# Patient Record
Sex: FEMALE | Race: BLACK OR AFRICAN AMERICAN | Employment: OTHER | URBAN - METROPOLITAN AREA
[De-identification: names, ages, dates, MRNs, and addresses within clinical notes are randomized per-mention and may not be internally consistent; named-entity substitution may affect disease eponyms.]

---

## 2017-02-24 ENCOUNTER — HOSPITAL ENCOUNTER (EMERGENCY)
Age: 72
Discharge: HOME | End: 2017-02-24
Attending: EMERGENCY MEDICINE
Payer: MEDICARE

## 2017-02-24 ENCOUNTER — APPOINTMENT (OUTPATIENT)
Dept: GENERAL RADIOLOGY | Age: 72
End: 2017-02-24
Attending: NURSE PRACTITIONER
Payer: MEDICARE

## 2017-02-24 VITALS
SYSTOLIC BLOOD PRESSURE: 153 MMHG | HEART RATE: 75 BPM | WEIGHT: 260 LBS | DIASTOLIC BLOOD PRESSURE: 73 MMHG | TEMPERATURE: 98 F | HEIGHT: 71 IN | BODY MASS INDEX: 36.4 KG/M2 | OXYGEN SATURATION: 97 % | RESPIRATION RATE: 17 BRPM

## 2017-02-24 DIAGNOSIS — M79.89 LEG SWELLING: Primary | ICD-10-CM

## 2017-02-24 DIAGNOSIS — R53.81 DEBILITY: ICD-10-CM

## 2017-02-24 DIAGNOSIS — N30.01 ACUTE CYSTITIS WITH HEMATURIA: ICD-10-CM

## 2017-02-24 LAB
ALBUMIN SERPL BCP-MCNC: 3.8 G/DL (ref 3.5–5)
ALBUMIN/GLOB SERPL: 1 {RATIO} (ref 1.1–2.2)
ALP SERPL-CCNC: 111 U/L (ref 45–117)
ALT SERPL-CCNC: 19 U/L (ref 12–78)
ANION GAP BLD CALC-SCNC: 5 MMOL/L (ref 5–15)
APPEARANCE UR: ABNORMAL
AST SERPL W P-5'-P-CCNC: 12 U/L (ref 15–37)
BACTERIA URNS QL MICRO: ABNORMAL /HPF
BASOPHILS # BLD AUTO: 0 K/UL (ref 0–0.1)
BASOPHILS # BLD: 0 % (ref 0–1)
BILIRUB SERPL-MCNC: 0.4 MG/DL (ref 0.2–1)
BILIRUB UR QL: NEGATIVE
BNP SERPL-MCNC: 130 PG/ML (ref 0–125)
BUN SERPL-MCNC: 22 MG/DL (ref 6–20)
BUN/CREAT SERPL: 35 (ref 12–20)
CALCIUM SERPL-MCNC: 8.7 MG/DL (ref 8.5–10.1)
CHLORIDE SERPL-SCNC: 107 MMOL/L (ref 97–108)
CK SERPL-CCNC: 83 U/L (ref 26–192)
CO2 SERPL-SCNC: 29 MMOL/L (ref 21–32)
COLOR UR: ABNORMAL
CREAT SERPL-MCNC: 0.62 MG/DL (ref 0.55–1.02)
EOSINOPHIL # BLD: 0.2 K/UL (ref 0–0.4)
EOSINOPHIL NFR BLD: 3 % (ref 0–7)
EPITH CASTS URNS QL MICRO: ABNORMAL /LPF
ERYTHROCYTE [DISTWIDTH] IN BLOOD BY AUTOMATED COUNT: 14.2 % (ref 11.5–14.5)
GLOBULIN SER CALC-MCNC: 3.8 G/DL (ref 2–4)
GLUCOSE SERPL-MCNC: 85 MG/DL (ref 65–100)
GLUCOSE UR STRIP.AUTO-MCNC: NEGATIVE MG/DL
HCT VFR BLD AUTO: 35 % (ref 35–47)
HGB BLD-MCNC: 10.5 G/DL (ref 11.5–16)
HGB UR QL STRIP: ABNORMAL
HYALINE CASTS URNS QL MICRO: ABNORMAL /LPF (ref 0–5)
KETONES UR QL STRIP.AUTO: NEGATIVE MG/DL
LEUKOCYTE ESTERASE UR QL STRIP.AUTO: ABNORMAL
LYMPHOCYTES # BLD AUTO: 33 % (ref 12–49)
LYMPHOCYTES # BLD: 2.2 K/UL (ref 0.8–3.5)
MCH RBC QN AUTO: 26.4 PG (ref 26–34)
MCHC RBC AUTO-ENTMCNC: 30 G/DL (ref 30–36.5)
MCV RBC AUTO: 88.2 FL (ref 80–99)
MONOCYTES # BLD: 0.5 K/UL (ref 0–1)
MONOCYTES NFR BLD AUTO: 7 % (ref 5–13)
NEUTS SEG # BLD: 3.9 K/UL (ref 1.8–8)
NEUTS SEG NFR BLD AUTO: 57 % (ref 32–75)
NITRITE UR QL STRIP.AUTO: POSITIVE
PH UR STRIP: 6 [PH] (ref 5–8)
PLATELET # BLD AUTO: 246 K/UL (ref 150–400)
POTASSIUM SERPL-SCNC: 3.9 MMOL/L (ref 3.5–5.1)
PROT SERPL-MCNC: 7.6 G/DL (ref 6.4–8.2)
PROT UR STRIP-MCNC: ABNORMAL MG/DL
RBC # BLD AUTO: 3.97 M/UL (ref 3.8–5.2)
RBC #/AREA URNS HPF: ABNORMAL /HPF (ref 0–5)
SODIUM SERPL-SCNC: 141 MMOL/L (ref 136–145)
SP GR UR REFRACTOMETRY: 1.02 (ref 1–1.03)
TROPONIN I SERPL-MCNC: <0.04 NG/ML
UROBILINOGEN UR QL STRIP.AUTO: 0.2 EU/DL (ref 0.2–1)
WBC # BLD AUTO: 6.8 K/UL (ref 3.6–11)
WBC URNS QL MICRO: ABNORMAL /HPF (ref 0–4)

## 2017-02-24 PROCEDURE — 36415 COLL VENOUS BLD VENIPUNCTURE: CPT | Performed by: NURSE PRACTITIONER

## 2017-02-24 PROCEDURE — 71010 XR CHEST PORT: CPT

## 2017-02-24 PROCEDURE — 84484 ASSAY OF TROPONIN QUANT: CPT | Performed by: NURSE PRACTITIONER

## 2017-02-24 PROCEDURE — 82550 ASSAY OF CK (CPK): CPT | Performed by: NURSE PRACTITIONER

## 2017-02-24 PROCEDURE — 93005 ELECTROCARDIOGRAM TRACING: CPT

## 2017-02-24 PROCEDURE — 83880 ASSAY OF NATRIURETIC PEPTIDE: CPT | Performed by: NURSE PRACTITIONER

## 2017-02-24 PROCEDURE — 85025 COMPLETE CBC W/AUTO DIFF WBC: CPT | Performed by: NURSE PRACTITIONER

## 2017-02-24 PROCEDURE — 81001 URINALYSIS AUTO W/SCOPE: CPT | Performed by: NURSE PRACTITIONER

## 2017-02-24 PROCEDURE — 80053 COMPREHEN METABOLIC PANEL: CPT | Performed by: NURSE PRACTITIONER

## 2017-02-24 PROCEDURE — 99284 EMERGENCY DEPT VISIT MOD MDM: CPT

## 2017-02-24 RX ORDER — FUROSEMIDE 40 MG/1
40 TABLET ORAL DAILY
Qty: 7 TAB | Refills: 0 | Status: SHIPPED | OUTPATIENT
Start: 2017-02-24 | End: 2017-03-03

## 2017-02-24 RX ORDER — CEPHALEXIN 500 MG/1
500 CAPSULE ORAL 4 TIMES DAILY
Qty: 28 CAP | Refills: 0 | Status: SHIPPED | OUTPATIENT
Start: 2017-02-24 | End: 2017-03-03

## 2017-02-25 NOTE — DISCHARGE INSTRUCTIONS
We hope that we have addressed all of your medical concerns. The examination and treatment you received in the Emergency Department were for an emergent problem and were not intended as complete care. It is important that you follow up with your healthcare provider(s) for ongoing care. If your symptoms worsen or do not improve as expected, and you are unable to reach your usual health care provider(s), you should return to the Emergency Department. Today's healthcare is undergoing tremendous change, and patient satisfaction surveys are one of the many tools to assess the quality of medical care. You may receive a survey from the [facility] regarding your experience in the Emergency Department. I hope that your experience has been completely positive, particularly the medical care that I provided. As such, please participate in the survey; anything less than excellent does not meet my expectations or intentions. [facility] and [address] [place] [address] participate in nationally recognized quality of care measures. If your blood pressure is greater than 120/80, as reported below, we urge that you seek medical care to address the potential of high blood pressure, commonly known as hypertension. Hypertension can be hereditary or can be caused by certain medical conditions, pain, stress, or \"white coat syndrome. \"       Please make an appointment with your health care provider(s) for follow up of your Emergency Department visit. VITALS:   Patient Vitals for the past 8 hrs:   Temp Pulse Resp BP SpO2   02/24/17 2034 98 °F (36.7 °C) 75 17 153/73 97 %          Thank you for allowing us to provide you with medical care today. We realize that you have many choices for your emergency care needs. Please choose us in the future for any continued health care needs. Linsey Patrick, [address] [place] [address] [address]   Office: [phone]            Recent Results (from the past 24 hour(s))   EKG, 12 LEAD, INITIAL    Collection Time: 02/24/17 10:04 PM   Result Value Ref Range    Ventricular Rate 72 BPM    Atrial Rate 72 BPM    P-R Interval 194 ms    QRS Duration 104 ms    Q-T Interval 384 ms    QTC Calculation (Bezet) 420 ms    Calculated P Axis 28 degrees    Calculated R Axis 36 degrees    Calculated T Axis 40 degrees    Diagnosis       Normal sinus rhythm  Normal ECG  No previous ECGs available     CBC WITH AUTOMATED DIFF    Collection Time: 02/24/17 10:11 PM   Result Value Ref Range    WBC 6.8 3.6 - 11.0 K/uL    RBC 3.97 3.80 - 5.20 M/uL    HGB 10.5 (L) 11.5 - 16.0 g/dL    HCT 35.0 35.0 - 47.0 %    MCV 88.2 80.0 - 99.0 FL    MCH 26.4 26.0 - 34.0 PG    MCHC 30.0 30.0 - 36.5 g/dL    RDW 14.2 11.5 - 14.5 %    PLATELET 381 511 - 567 K/uL    NEUTROPHILS 57 32 - 75 %    LYMPHOCYTES 33 12 - 49 %    MONOCYTES 7 5 - 13 %    EOSINOPHILS 3 0 - 7 %    BASOPHILS 0 0 - 1 %    ABS. NEUTROPHILS 3.9 1.8 - 8.0 K/UL    ABS. LYMPHOCYTES 2.2 0.8 - 3.5 K/UL    ABS. MONOCYTES 0.5 0.0 - 1.0 K/UL    ABS. EOSINOPHILS 0.2 0.0 - 0.4 K/UL    ABS. BASOPHILS 0.0 0.0 - 0.1 K/UL   METABOLIC PANEL, COMPREHENSIVE    Collection Time: 02/24/17 10:11 PM   Result Value Ref Range    Sodium 141 136 - 145 mmol/L    Potassium 3.9 3.5 - 5.1 mmol/L    Chloride 107 97 - 108 mmol/L    CO2 29 21 - 32 mmol/L    Anion gap 5 5 - 15 mmol/L    Glucose 85 65 - 100 mg/dL    BUN 22 (H) 6 - 20 MG/DL    Creatinine 0.62 0.55 - 1.02 MG/DL    BUN/Creatinine ratio 35 (H) 12 - 20      GFR est AA >60 >60 ml/min/1.73m2    GFR est non-AA >60 >60 ml/min/1.73m2    Calcium 8.7 8.5 - 10.1 MG/DL    Bilirubin, total 0.4 0.2 - 1.0 MG/DL    ALT (SGPT) 19 12 - 78 U/L    AST (SGOT) 12 (L) 15 - 37 U/L    Alk.  phosphatase 111 45 - 117 U/L    Protein, total 7.6 6.4 - 8.2 g/dL    Albumin 3.8 3.5 - 5.0 g/dL    Globulin 3.8 2.0 - 4.0 g/dL    A-G Ratio 1.0 (L) 1.1 - 2.2     PRO-BNP    Collection Time: 02/24/17 10:11 PM   Result Value Ref Range    NT pro- (H) 0 - 125 PG/ML   TROPONIN I    Collection Time: 02/24/17 10:11 PM   Result Value Ref Range    Troponin-I, Qt. <0.04 <0.05 ng/mL   CK W/ REFLX CKMB    Collection Time: 02/24/17 10:11 PM   Result Value Ref Range    CK 83 26 - 192 U/L   URINALYSIS W/MICROSCOPIC    Collection Time: 02/24/17 10:18 PM   Result Value Ref Range    Color YELLOW/STRAW      Appearance CLOUDY (A) CLEAR      Specific gravity 1.020 1.003 - 1.030      pH (UA) 6.0 5.0 - 8.0      Protein TRACE (A) NEG mg/dL    Glucose NEGATIVE  NEG mg/dL    Ketone NEGATIVE  NEG mg/dL    Bilirubin NEGATIVE  NEG      Blood MODERATE (A) NEG      Urobilinogen 0.2 0.2 - 1.0 EU/dL    Nitrites POSITIVE (A) NEG      Leukocyte Esterase SMALL (A) NEG      WBC 20-50 0 - 4 /hpf    RBC 10-20 0 - 5 /hpf    Epithelial cells FEW FEW /lpf    Bacteria 4+ (A) NEG /hpf    Hyaline cast 0-2 0 - 5 /lpf       Xr Chest Port    Result Date: 2/24/2017  EXAM:  XR CHEST PORT INDICATION:  Bilateral lower extremity swelling COMPARISON:  None. FINDINGS: A portable AP radiograph of the chest was obtained at 2142 hours. There is no pneumothorax or pleural effusion. The lungs are clear. Cardiac size is within normal limits. Minimal thoracic aortic tortuosity is shown with otherwise normal mediastinal and hilar contour. Severe right glenohumeral osteoarthrosis is noted. IMPRESSION: No acute findings            Urinary Tract Infection in Women: Care Instructions  Your Care Instructions    A urinary tract infection, or UTI, is a general term for an infection anywhere between the kidneys and the urethra (where urine comes out). Most UTIs are bladder infections. They often cause pain or burning when you urinate. UTIs are caused by bacteria and can be cured with antibiotics. Be sure to complete your treatment so that the infection goes away. Follow-up care is a key part of your treatment and safety.  Be sure to make and go to all appointments, and call your doctor if you are having problems. It's also a good idea to know your test results and keep a list of the medicines you take. How can you care for yourself at home? · Take your antibiotics as directed. Do not stop taking them just because you feel better. You need to take the full course of antibiotics. · Drink extra water and other fluids for the next day or two. This may help wash out the bacteria that are causing the infection. (If you have kidney, heart, or liver disease and have to limit fluids, talk with your doctor before you increase your fluid intake.)  · Avoid drinks that are carbonated or have caffeine. They can irritate the bladder. · Urinate often. Try to empty your bladder each time. · To relieve pain, take a hot bath or lay a heating pad set on low over your lower belly or genital area. Never go to sleep with a heating pad in place. To prevent UTIs  · Drink plenty of water each day. This helps you urinate often, which clears bacteria from your system. (If you have kidney, heart, or liver disease and have to limit fluids, talk with your doctor before you increase your fluid intake.)  · Consider adding cranberry juice to your diet. · Urinate when you need to. · Urinate right after you have sex. · Change sanitary pads often. · Avoid douches, bubble baths, feminine hygiene sprays, and other feminine hygiene products that have deodorants. · After going to the bathroom, wipe from front to back. When should you call for help? Call your doctor now or seek immediate medical care if:  · Symptoms such as fever, chills, nausea, or vomiting get worse or appear for the first time. · You have new pain in your back just below your rib cage. This is called flank pain. · There is new blood or pus in your urine. · You have any problems with your antibiotic medicine.   Watch closely for changes in your health, and be sure to contact your doctor if:  · You are not getting better after taking an antibiotic for 2 days. · Your symptoms go away but then come back. Where can you learn more? Go to http://debbie-queta.info/. Enter S930 in the search box to learn more about \"Urinary Tract Infection in Women: Care Instructions. \"  Current as of: August 12, 2016  Content Version: 11.1  © 4761-2452 [employer]. Care instructions adapted under license by [employer] (which disclaims liability or warranty for this information). If you have questions about a medical condition or this instruction, always ask your healthcare professional. [address] any warranty or liability for your use of this information.

## 2017-02-25 NOTE — ED PROVIDER NOTES
HPI Comments: Nat Scott is a 67 y.o. female with Hx of idiopathic neuropathy and chronic pain who presents via [facility] with her daughter to [facility] ED with cc of BLLE swelling. Pt notes worsening BLLE swelling x 1 day. She reports swelling from her feet to her thighs. She notes that she also feels her abdomen is a little distended as well. Pt primarily sits in [facility] and has very little ambulation. She is currently under the care of a rehab MD for pain management and does aqua therapy. She sleeps in a recliner, but denies any increased use of pillows 2/2 SOB lying flat. She denies CP/SOB/ recent unexplained weight gain. She denies any color or temperature changes to the BLLE. She has no recent history of fevers or chills. She notes that she has \"spitting\" urine but no dysuria, flank pain, hematuria. PCP: Frieda Easley DO    There are no other complaints, changes or physical findings at this time. The history is provided by the patient and a relative. Past Medical History:   Diagnosis Date    Arthritis     Asthma     Hypertension        Past Surgical History:   Procedure Laterality Date    HX ORTHOPAEDIC           History reviewed. No pertinent family history. Social History     Social History    Marital status: SINGLE     Spouse name: N/A    Number of children: N/A    Years of education: N/A     Occupational History    Not on file. Social History Main Topics    Smoking status: Never Smoker    Smokeless tobacco: Not on file    Alcohol use No    Drug use: No    Sexual activity: Not on file     Other Topics Concern    Not on file     Social History Narrative         ALLERGIES: Morphine    Review of Systems   Constitutional: Negative for activity change, appetite change, chills and fever. HENT: Negative for congestion, rhinorrhea, sinus pressure, sneezing and sore throat. Eyes: Negative for pain, discharge and visual disturbance.    Respiratory: Negative for cough and shortness of breath. Cardiovascular: Positive for leg swelling. Negative for chest pain. Gastrointestinal: Negative for abdominal pain, diarrhea, nausea and vomiting. Genitourinary: Negative for dysuria, flank pain, frequency and urgency. Musculoskeletal: Negative for arthralgias, back pain, gait problem, joint swelling, myalgias and neck pain. Skin: Negative for color change and rash. Neurological: Negative for dizziness, speech difficulty, weakness, light-headedness, numbness and headaches. Psychiatric/Behavioral: Negative for agitation, behavioral problems and confusion. All other systems reviewed and are negative. Vitals:    02/24/17 2034   BP: 153/73   Pulse: 75   Resp: 17   Temp: 98 °F (36.7 °C)   SpO2: 97%   Weight: 117.9 kg (260 lb)   Height: 5' 11\" (1.803 m)            Physical Exam   Constitutional: She is oriented to person, place, and time. She appears well-developed and well-nourished. No distress. HENT:   Head: Normocephalic and atraumatic. Right Ear: External ear normal.   Left Ear: External ear normal.   Nose: Nose normal.   Mouth/Throat: Oropharynx is clear and moist. No oropharyngeal exudate. Eyes: Conjunctivae and EOM are normal. Pupils are equal, round, and reactive to light. Neck: Normal range of motion. Neck supple. Cardiovascular: Normal rate, regular rhythm, normal heart sounds and intact distal pulses. Pulmonary/Chest: Effort normal and breath sounds normal.   Abdominal: Soft. Bowel sounds are normal. There is no tenderness. There is no rebound and no guarding. Musculoskeletal: Normal range of motion. She exhibits edema (+) 3 BLLE to thighs . Neurological: She is alert and oriented to person, place, and time. Skin: Skin is warm and dry. Psychiatric: She has a normal mood and affect. Her behavior is normal. Judgment and thought content normal.   Nursing note and vitals reviewed.        MDM  Number of Diagnoses or Management Options  Acute cystitis with hematuria: Debility:   Leg swelling:   Diagnosis management comments: DDx: CHF, venous insufficiency, chronic pain     68 yo F presents with BLLE swelling. (-) CHF w/u. VSS. Discussed possibility of venous insufficiency and need for PCP f/u. Pt agreeable. Pt leaves legs dependent most of the day, advised on elevation. Given short Lasix course and advised on SE, including loss of K. Amount and/or Complexity of Data Reviewed  Clinical lab tests: ordered and reviewed  Tests in the radiology section of CPT®: ordered and reviewed  Review and summarize past medical records: yes  Discuss the patient with other providers: yes      ED Course       Procedures    LABORATORY TESTS:  Recent Results (from the past 12 hour(s))   EKG, 12 LEAD, INITIAL    Collection Time: 02/24/17 10:04 PM   Result Value Ref Range    Ventricular Rate 72 BPM    Atrial Rate 72 BPM    P-R Interval 194 ms    QRS Duration 104 ms    Q-T Interval 384 ms    QTC Calculation (Bezet) 420 ms    Calculated P Axis 28 degrees    Calculated R Axis 36 degrees    Calculated T Axis 40 degrees    Diagnosis       Normal sinus rhythm  Normal ECG  No previous ECGs available     CBC WITH AUTOMATED DIFF    Collection Time: 02/24/17 10:11 PM   Result Value Ref Range    WBC 6.8 3.6 - 11.0 K/uL    RBC 3.97 3.80 - 5.20 M/uL    HGB 10.5 (L) 11.5 - 16.0 g/dL    HCT 35.0 35.0 - 47.0 %    MCV 88.2 80.0 - 99.0 FL    MCH 26.4 26.0 - 34.0 PG    MCHC 30.0 30.0 - 36.5 g/dL    RDW 14.2 11.5 - 14.5 %    PLATELET 160 831 - 238 K/uL    NEUTROPHILS 57 32 - 75 %    LYMPHOCYTES 33 12 - 49 %    MONOCYTES 7 5 - 13 %    EOSINOPHILS 3 0 - 7 %    BASOPHILS 0 0 - 1 %    ABS. NEUTROPHILS 3.9 1.8 - 8.0 K/UL    ABS. LYMPHOCYTES 2.2 0.8 - 3.5 K/UL    ABS. MONOCYTES 0.5 0.0 - 1.0 K/UL    ABS. EOSINOPHILS 0.2 0.0 - 0.4 K/UL    ABS.  BASOPHILS 0.0 0.0 - 0.1 K/UL   METABOLIC PANEL, COMPREHENSIVE    Collection Time: 02/24/17 10:11 PM   Result Value Ref Range    Sodium 141 136 - 145 mmol/L    Potassium 3.9 3.5 - 5.1 mmol/L    Chloride 107 97 - 108 mmol/L    CO2 29 21 - 32 mmol/L    Anion gap 5 5 - 15 mmol/L    Glucose 85 65 - 100 mg/dL    BUN 22 (H) 6 - 20 MG/DL    Creatinine 0.62 0.55 - 1.02 MG/DL    BUN/Creatinine ratio 35 (H) 12 - 20      GFR est AA >60 >60 ml/min/1.73m2    GFR est non-AA >60 >60 ml/min/1.73m2    Calcium 8.7 8.5 - 10.1 MG/DL    Bilirubin, total 0.4 0.2 - 1.0 MG/DL    ALT (SGPT) 19 12 - 78 U/L    AST (SGOT) 12 (L) 15 - 37 U/L    Alk. phosphatase 111 45 - 117 U/L    Protein, total 7.6 6.4 - 8.2 g/dL    Albumin 3.8 3.5 - 5.0 g/dL    Globulin 3.8 2.0 - 4.0 g/dL    A-G Ratio 1.0 (L) 1.1 - 2.2     PRO-BNP    Collection Time: 02/24/17 10:11 PM   Result Value Ref Range    NT pro- (H) 0 - 125 PG/ML   TROPONIN I    Collection Time: 02/24/17 10:11 PM   Result Value Ref Range    Troponin-I, Qt. <0.04 <0.05 ng/mL   CK W/ REFLX CKMB    Collection Time: 02/24/17 10:11 PM   Result Value Ref Range    CK 83 26 - 192 U/L   URINALYSIS W/MICROSCOPIC    Collection Time: 02/24/17 10:18 PM   Result Value Ref Range    Color YELLOW/STRAW      Appearance CLOUDY (A) CLEAR      Specific gravity 1.020 1.003 - 1.030      pH (UA) 6.0 5.0 - 8.0      Protein TRACE (A) NEG mg/dL    Glucose NEGATIVE  NEG mg/dL    Ketone NEGATIVE  NEG mg/dL    Bilirubin NEGATIVE  NEG      Blood MODERATE (A) NEG      Urobilinogen 0.2 0.2 - 1.0 EU/dL    Nitrites POSITIVE (A) NEG      Leukocyte Esterase SMALL (A) NEG      WBC 20-50 0 - 4 /hpf    RBC 10-20 0 - 5 /hpf    Epithelial cells FEW FEW /lpf    Bacteria 4+ (A) NEG /hpf    Hyaline cast 0-2 0 - 5 /lpf       IMAGING RESULTS:  XR CHEST PORT   Final Result      EXAM: XR CHEST PORT     INDICATION: Bilateral lower extremity swelling     COMPARISON: None.     FINDINGS: A portable AP radiograph of the chest was obtained at 2142 hours. There is no pneumothorax or pleural effusion. The lungs are clear. Cardiac  size is within normal limits.  Minimal thoracic aortic tortuosity is shown with  otherwise normal mediastinal and hilar contour. Severe right glenohumeral  osteoarthrosis is noted.      IMPRESSION  IMPRESSION: No acute findings       MEDICATIONS GIVEN:  Medications - No data to display    IMPRESSION:  1. Leg swelling    2. Acute cystitis with hematuria    3. Debility        PLAN:  1. Discharge Medication List as of 2/24/2017 11:12 PM      START taking these medications    Details   furosemide (LASIX) 40 mg tablet Take 1 Tab by mouth daily for 7 days. , Print, Disp-7 Tab, R-0      cephALEXin (KEFLEX) 500 mg capsule Take 1 Cap by mouth four (4) times daily for 7 days. , Print, Disp-28 Cap, R-0         CONTINUE these medications which have NOT CHANGED    Details   ibuprofen (MOTRIN) 400 mg tablet Take  by mouth every six (6) hours as needed for Pain., Historical Med      naproxen (NAPROSYN) 500 mg tablet Take 1 Tab by mouth every twelve (12) hours as needed for Pain., Print, Disp-20 Tab, R-0      HYDROcodone-acetaminophen (NORCO) 5-325 mg per tablet Take 1 Tab by mouth every four (4) hours as needed for Pain. Max Daily Amount: 6 Tabs., Print, Disp-20 Tab, R-0      !! gabapentin (NEURONTIN) 300 mg capsule Take 1 Cap by mouth nightly. , Print, Disp-20 Cap, R-0      LOSARTAN PO Take  by mouth two (2) times a day. Historical Med      PHENYLEPHRINE/HYDROCODONE/CP (HYDROCODONE CP PO) Take  by mouth. Historical Med      !! gabapentin (NEURONTIN) 300 mg capsule Take 1 Cap by mouth nightly. Print, 300 mg, Disp-30 Cap, R-0      zolpidem (AMBIEN) 10 mg tablet Take 10 mg by mouth nightly as needed. Historical Med, 10 mg       !! - Potential duplicate medications found. Please discuss with provider.         2.   Follow-up Information     Follow up With Details Comments [address], DO Schedule an appointment as soon as possible for a visit Please go see on Monday/Tuesday regarding your swelling  [address]  [address] [place] [address]      [facility] EMERGENCY DEPT Go to As needed, If symptoms worsen [facility]  [phone]        3. Return to ED if worse     Discharge Note:    The patient is ready for discharge. The patient's signs, symptoms, diagnosis, and discharge instruction have been discussed and the patient has conveyed their understanding. The patient is to follow up as recommended or return to the ER should their symptoms worsen. Plan has been discussed and the patient is in agreement.     Mac Burnett NP

## 2017-02-26 LAB
ATRIAL RATE: 72 BPM
CALCULATED P AXIS, ECG09: 28 DEGREES
CALCULATED R AXIS, ECG10: 36 DEGREES
CALCULATED T AXIS, ECG11: 40 DEGREES
DIAGNOSIS, 93000: NORMAL
P-R INTERVAL, ECG05: 194 MS
Q-T INTERVAL, ECG07: 384 MS
QRS DURATION, ECG06: 104 MS
QTC CALCULATION (BEZET), ECG08: 420 MS
VENTRICULAR RATE, ECG03: 72 BPM

## 2017-09-07 ENCOUNTER — HOSPITAL ENCOUNTER (EMERGENCY)
Age: 72
Discharge: HOME | End: 2017-09-07
Attending: EMERGENCY MEDICINE
Payer: MEDICARE

## 2017-09-07 ENCOUNTER — APPOINTMENT (OUTPATIENT)
Dept: CT IMAGING | Age: 72
End: 2017-09-07
Attending: PHYSICIAN ASSISTANT
Payer: MEDICARE

## 2017-09-07 VITALS
BODY MASS INDEX: 37.22 KG/M2 | OXYGEN SATURATION: 98 % | TEMPERATURE: 98.5 F | RESPIRATION RATE: 16 BRPM | DIASTOLIC BLOOD PRESSURE: 97 MMHG | HEIGHT: 70 IN | WEIGHT: 260 LBS | SYSTOLIC BLOOD PRESSURE: 191 MMHG | HEART RATE: 74 BPM

## 2017-09-07 DIAGNOSIS — G89.4 CHRONIC PAIN SYNDROME: ICD-10-CM

## 2017-09-07 DIAGNOSIS — R51.9 HEADACHE, UNSPECIFIED HEADACHE TYPE: ICD-10-CM

## 2017-09-07 DIAGNOSIS — N39.0 URINARY TRACT INFECTION WITHOUT HEMATURIA, SITE UNSPECIFIED: Primary | ICD-10-CM

## 2017-09-07 LAB
ANION GAP SERPL CALC-SCNC: 6 MMOL/L (ref 5–15)
APPEARANCE UR: ABNORMAL
BACTERIA URNS QL MICRO: ABNORMAL /HPF
BASOPHILS # BLD: 0 K/UL (ref 0–0.1)
BASOPHILS NFR BLD: 0 % (ref 0–1)
BILIRUB UR QL: NEGATIVE
BUN SERPL-MCNC: 16 MG/DL (ref 6–20)
BUN/CREAT SERPL: 19 (ref 12–20)
CALCIUM SERPL-MCNC: 9.1 MG/DL (ref 8.5–10.1)
CHLORIDE SERPL-SCNC: 103 MMOL/L (ref 97–108)
CO2 SERPL-SCNC: 31 MMOL/L (ref 21–32)
COLOR UR: ABNORMAL
CREAT SERPL-MCNC: 0.85 MG/DL (ref 0.55–1.02)
EOSINOPHIL # BLD: 0.1 K/UL (ref 0–0.4)
EOSINOPHIL NFR BLD: 1 % (ref 0–7)
EPITH CASTS URNS QL MICRO: ABNORMAL /LPF
ERYTHROCYTE [DISTWIDTH] IN BLOOD BY AUTOMATED COUNT: 13.8 % (ref 11.5–14.5)
GLUCOSE SERPL-MCNC: 117 MG/DL (ref 65–100)
GLUCOSE UR STRIP.AUTO-MCNC: NEGATIVE MG/DL
HCT VFR BLD AUTO: 36.6 % (ref 35–47)
HGB BLD-MCNC: 11.4 G/DL (ref 11.5–16)
HGB UR QL STRIP: ABNORMAL
KETONES UR QL STRIP.AUTO: NEGATIVE MG/DL
LEUKOCYTE ESTERASE UR QL STRIP.AUTO: ABNORMAL
LYMPHOCYTES # BLD: 1.4 K/UL (ref 0.8–3.5)
LYMPHOCYTES NFR BLD: 26 % (ref 12–49)
MCH RBC QN AUTO: 27.5 PG (ref 26–34)
MCHC RBC AUTO-ENTMCNC: 31.1 G/DL (ref 30–36.5)
MCV RBC AUTO: 88.2 FL (ref 80–99)
MONOCYTES # BLD: 0.6 K/UL (ref 0–1)
MONOCYTES NFR BLD: 11 % (ref 5–13)
NEUTS SEG # BLD: 3.5 K/UL (ref 1.8–8)
NEUTS SEG NFR BLD: 62 % (ref 32–75)
NITRITE UR QL STRIP.AUTO: POSITIVE
PH UR STRIP: 7 [PH] (ref 5–8)
PLATELET # BLD AUTO: 258 K/UL (ref 150–400)
POTASSIUM SERPL-SCNC: 4 MMOL/L (ref 3.5–5.1)
PROT UR STRIP-MCNC: NEGATIVE MG/DL
RBC # BLD AUTO: 4.15 M/UL (ref 3.8–5.2)
RBC #/AREA URNS HPF: ABNORMAL /HPF (ref 0–5)
SODIUM SERPL-SCNC: 140 MMOL/L (ref 136–145)
SP GR UR REFRACTOMETRY: 1.02 (ref 1–1.03)
UROBILINOGEN UR QL STRIP.AUTO: 0.2 EU/DL (ref 0.2–1)
WBC # BLD AUTO: 5.6 K/UL (ref 3.6–11)
WBC URNS QL MICRO: ABNORMAL /HPF (ref 0–4)

## 2017-09-07 PROCEDURE — 74011250636 HC RX REV CODE- 250/636: Performed by: PHYSICIAN ASSISTANT

## 2017-09-07 PROCEDURE — 85025 COMPLETE CBC W/AUTO DIFF WBC: CPT | Performed by: EMERGENCY MEDICINE

## 2017-09-07 PROCEDURE — 80048 BASIC METABOLIC PNL TOTAL CA: CPT | Performed by: EMERGENCY MEDICINE

## 2017-09-07 PROCEDURE — 96365 THER/PROPH/DIAG IV INF INIT: CPT

## 2017-09-07 PROCEDURE — 87077 CULTURE AEROBIC IDENTIFY: CPT | Performed by: PHYSICIAN ASSISTANT

## 2017-09-07 PROCEDURE — 96366 THER/PROPH/DIAG IV INF ADDON: CPT

## 2017-09-07 PROCEDURE — 87086 URINE CULTURE/COLONY COUNT: CPT | Performed by: PHYSICIAN ASSISTANT

## 2017-09-07 PROCEDURE — 87186 SC STD MICRODIL/AGAR DIL: CPT | Performed by: PHYSICIAN ASSISTANT

## 2017-09-07 PROCEDURE — 74011000258 HC RX REV CODE- 258: Performed by: PHYSICIAN ASSISTANT

## 2017-09-07 PROCEDURE — 36415 COLL VENOUS BLD VENIPUNCTURE: CPT | Performed by: EMERGENCY MEDICINE

## 2017-09-07 PROCEDURE — 70450 CT HEAD/BRAIN W/O DYE: CPT

## 2017-09-07 PROCEDURE — 99283 EMERGENCY DEPT VISIT LOW MDM: CPT

## 2017-09-07 PROCEDURE — 81001 URINALYSIS AUTO W/SCOPE: CPT | Performed by: EMERGENCY MEDICINE

## 2017-09-07 RX ORDER — CEPHALEXIN 500 MG/1
500 CAPSULE ORAL 3 TIMES DAILY
Qty: 30 CAP | Refills: 0 | Status: SHIPPED | OUTPATIENT
Start: 2017-09-07 | End: 2017-09-17

## 2017-09-07 RX ADMIN — CEFTRIAXONE SODIUM 1 G: 1 INJECTION, POWDER, FOR SOLUTION INTRAMUSCULAR; INTRAVENOUS at 15:28

## 2017-09-07 NOTE — ED PROVIDER NOTES
HPI Comments: Nehemias Barraza is a 67 y.o. female who presents ambulatory to the ED with a c/o diffuse frontal headache x 2 hours with body aches and increased urinary frequency x 2 days. Pt notes she has a hx of chronic body pains and is taking norco for the same. She is seeing Dr Candice Siegel and he \"will not increase my pain medicine\". Pt states she took 2 pain pills this am even though she knows she is only supposed to take it twice daily. Pt denies a hx of headaches. She notes some nasal congestion. Pt denies f/c, n/v/, neck pain, abd pain, cp, sob, abd pain, rash or weight loss. She did not call her pcp about her sx. Pt denies focal weakness or speech changes. PCP: .Fior Oliver, DO  PMHx significant for: Past Medical History:  No date: Arthritis  No date: Asthma  No date: Hypertension  PSHx significant for: Past Surgical History:  No date: HX ORTHOPAEDIC  Social Hx: Tobacco: denies EtOH: denies  Illicit drug use: denies     There are no further complaints or symptoms at this time. The history is provided by the patient. Past Medical History:   Diagnosis Date    Arthritis     Asthma     Hypertension        Past Surgical History:   Procedure Laterality Date    HX ORTHOPAEDIC           No family history on file. Social History     Social History    Marital status: SINGLE     Spouse name: N/A    Number of children: N/A    Years of education: N/A     Occupational History    Not on file. Social History Main Topics    Smoking status: Never Smoker    Smokeless tobacco: Not on file    Alcohol use No    Drug use: No    Sexual activity: Not on file     Other Topics Concern    Not on file     Social History Narrative         ALLERGIES: Morphine    Review of Systems   Constitutional: Negative for chills and fever. HENT: Negative for congestion, rhinorrhea, sneezing and sore throat. Eyes: Negative for redness and visual disturbance. Respiratory: Negative for shortness of breath. Cardiovascular: Negative for chest pain and leg swelling. Gastrointestinal: Negative for abdominal pain, nausea and vomiting. Genitourinary: Positive for frequency. Negative for difficulty urinating. Musculoskeletal: Negative for back pain, myalgias and neck stiffness. Skin: Negative for rash. Neurological: Positive for headaches. Negative for dizziness, syncope and weakness. Hematological: Negative for adenopathy. Vitals:    09/07/17 1131 09/07/17 1708   BP: 177/89 (!) 191/97   Pulse: 79 74   Resp: 16    Temp: 98.5 °F (36.9 °C)    SpO2: 100% 98%   Weight: 117.9 kg (260 lb)    Height: 5' 10\" (1.778 m)             Physical Exam   Constitutional: She is oriented to person, place, and time. She appears well-developed and well-nourished. No distress. HENT:   Head: Normocephalic and atraumatic. Right Ear: External ear normal.   Left Ear: External ear normal.   Mouth/Throat: Oropharynx is clear and moist. No oropharyngeal exudate. Pale nares with clear rhinorrhea     Eyes: EOM are normal. Pupils are equal, round, and reactive to light. Neck: Neck supple. No JVD present. No tracheal deviation present. No nuchal rigidity or meningismus   Cardiovascular: Normal rate, regular rhythm, normal heart sounds and intact distal pulses. Exam reveals no gallop and no friction rub. No murmur heard. Pulmonary/Chest: Effort normal and breath sounds normal. No stridor. No respiratory distress. She has no wheezes. She has no rales. She exhibits no tenderness. Abdominal: Soft. Bowel sounds are normal. She exhibits no distension and no mass. There is no tenderness. There is no rebound and no guarding. Musculoskeletal: Normal range of motion. She exhibits no edema, tenderness or deformity. Lymphadenopathy:     She has no cervical adenopathy. Neurological: She is alert and oriented to person, place, and time. No cranial nerve deficit. Coordination normal.   Skin: No rash noted. No erythema. No pallor. Psychiatric: She has a normal mood and affect. Her behavior is normal.   Nursing note and vitals reviewed. MDM  Number of Diagnoses or Management Options  Chronic pain syndrome:   Headache, unspecified headache type:   Urinary tract infection without hematuria, site unspecified:      Amount and/or Complexity of Data Reviewed  Clinical lab tests: ordered and reviewed  Tests in the radiology section of CPT®: ordered and reviewed  Tests in the medicine section of CPT®: reviewed and ordered  Obtain history from someone other than the patient: yes (daughter)  Review and summarize past medical records: yes  Independent visualization of images, tracings, or specimens: yes      ED Course       Procedures  2:00 PM  Discussed pt, sx, hx and current findings with Dr Reyes Vazquez. She is in agreement with plan and will see pt  Jeffy Darshana. GUY Smyth    3:33 PM  Updated on findings. Will give rocephin for uti. Pt eating snack. Jeffy Smyth PA-C    4:41 PM   Pt with no new sx. Feeling better. Will discharge. Will tx for UTI  Jeffy Darshana.  GUY Smyth    LABORATORY TESTS:  Recent Results (from the past 12 hour(s))   URINALYSIS W/MICROSCOPIC    Collection Time: 09/07/17 12:57 PM   Result Value Ref Range    Color YELLOW/STRAW      Appearance TURBID (A) CLEAR      Specific gravity 1.018 1.003 - 1.030      pH (UA) 7.0 5.0 - 8.0      Protein NEGATIVE  NEG mg/dL    Glucose NEGATIVE  NEG mg/dL    Ketone NEGATIVE  NEG mg/dL    Bilirubin NEGATIVE  NEG      Blood MODERATE (A) NEG      Urobilinogen 0.2 0.2 - 1.0 EU/dL    Nitrites POSITIVE (A) NEG      Leukocyte Esterase LARGE (A) NEG      WBC 20-50 0 - 4 /hpf    RBC 0-5 0 - 5 /hpf    Epithelial cells FEW FEW /lpf    Bacteria 4+ (A) NEG /hpf   CBC WITH AUTOMATED DIFF    Collection Time: 09/07/17 12:57 PM   Result Value Ref Range    WBC 5.6 3.6 - 11.0 K/uL    RBC 4.15 3.80 - 5.20 M/uL    HGB 11.4 (L) 11.5 - 16.0 g/dL    HCT 36.6 35.0 - 47.0 %    MCV 88.2 80.0 - 99.0 FL    MCH 27.5 26.0 - 34.0 PG    MCHC 31.1 30.0 - 36.5 g/dL    RDW 13.8 11.5 - 14.5 %    PLATELET 793 668 - 078 K/uL    NEUTROPHILS 62 32 - 75 %    LYMPHOCYTES 26 12 - 49 %    MONOCYTES 11 5 - 13 %    EOSINOPHILS 1 0 - 7 %    BASOPHILS 0 0 - 1 %    ABS. NEUTROPHILS 3.5 1.8 - 8.0 K/UL    ABS. LYMPHOCYTES 1.4 0.8 - 3.5 K/UL    ABS. MONOCYTES 0.6 0.0 - 1.0 K/UL    ABS. EOSINOPHILS 0.1 0.0 - 0.4 K/UL    ABS. BASOPHILS 0.0 0.0 - 0.1 K/UL   METABOLIC PANEL, BASIC    Collection Time: 09/07/17 12:57 PM   Result Value Ref Range    Sodium 140 136 - 145 mmol/L    Potassium 4.0 3.5 - 5.1 mmol/L    Chloride 103 97 - 108 mmol/L    CO2 31 21 - 32 mmol/L    Anion gap 6 5 - 15 mmol/L    Glucose 117 (H) 65 - 100 mg/dL    BUN 16 6 - 20 MG/DL    Creatinine 0.85 0.55 - 1.02 MG/DL    BUN/Creatinine ratio 19 12 - 20      GFR est AA >60 >60 ml/min/1.73m2    GFR est non-AA >60 >60 ml/min/1.73m2    Calcium 9.1 8.5 - 10.1 MG/DL       IMAGING RESULTS:    Ct Head Wo Cont    Result Date: 9/7/2017  EXAM:  CT HEAD WO CONT INDICATION:   headache since last night COMPARISON: None. CONTRAST:  None. TECHNIQUE: Unenhanced CT of the head was performed using 5 mm images. Brain and bone windows were generated. CT dose reduction was achieved through use of a standardized protocol tailored for this examination and automatic exposure control for dose modulation. FINDINGS: The ventricles and sulci are normal in size, shape and configuration and midline. There is moderate hypodensity of the cerebral white matter, likely due to intracranial small vessel disease. . There is no intracranial hemorrhage, extra-axial collection, mass, mass effect or midline shift. The basilar cisterns are open. No acute infarct is identified. The bone windows demonstrate no abnormalities. The visualized portions of the paranasal sinuses and mastoid air cells are clear. IMPRESSION: No acute findings. Hypodensity of the cerebral white matter, likely due to intracranial small vessel disease. MEDICATIONS GIVEN:  Medications   cefTRIAXone (ROCEPHIN) 1 g in 0.9% sodium chloride (MBP/ADV) 50 mL (0 g IntraVENous IV Completed 9/7/17 1710)       IMPRESSION:  1. Urinary tract infection without hematuria, site unspecified    2. Headache, unspecified headache type    3. Chronic pain syndrome        PLAN:  1. Discharge Medication List as of 9/7/2017  4:45 PM      START taking these medications    Details   cephALEXin (KEFLEX) 500 mg capsule Take 1 Cap by mouth three (3) times daily for 10 days. , Print, Disp-30 Cap, R-0         CONTINUE these medications which have NOT CHANGED    Details   ibuprofen (MOTRIN) 400 mg tablet Take  by mouth every six (6) hours as needed for Pain., Historical Med      naproxen (NAPROSYN) 500 mg tablet Take 1 Tab by mouth every twelve (12) hours as needed for Pain., Print, Disp-20 Tab, R-0      HYDROcodone-acetaminophen (NORCO) 5-325 mg per tablet Take 1 Tab by mouth every four (4) hours as needed for Pain. Max Daily Amount: 6 Tabs., Print, Disp-20 Tab, R-0      !! gabapentin (NEURONTIN) 300 mg capsule Take 1 Cap by mouth nightly. , Print, Disp-20 Cap, R-0      LOSARTAN PO Take  by mouth two (2) times a day. Historical Med      PHENYLEPHRINE/HYDROCODONE/CP (HYDROCODONE CP PO) Take  by mouth. Historical Med      !! gabapentin (NEURONTIN) 300 mg capsule Take 1 Cap by mouth nightly. Print, 300 mg, Disp-30 Cap, R-0      zolpidem (AMBIEN) 10 mg tablet Take 10 mg by mouth nightly as needed. Historical Med, 10 mg       !! - Potential duplicate medications found. Please discuss with provider. 2.   Follow-up Information     Follow up With Details Comments [address], [place] Schedule an appointment as soon as possible for a visit 2-4 days for recheck [address] [place] [address]  [phone]          Return to ED if worse     4:42 PM  Pt has been reexamined. Pt has no new complaints, changes or physical findings.  Care plan outlined and precautions discussed. All available results were reviewed with pt. All medications were reviewed with pt. All of pt's questions and concerns were addressed. Pt agrees to F/U as instructed and agrees to return to ED upon further deterioration. Pt is ready to go home.   CARMEN Solis

## 2017-09-07 NOTE — ED NOTES
Patient arrived through triage c/o urinary frequency and headache since last night. Patient is resting in bell bed in view of nurses.

## 2017-09-07 NOTE — DISCHARGE INSTRUCTIONS
Headache: Care Instructions  Your Care Instructions    Headaches have many possible causes. Most headaches aren't a sign of a more serious problem, and they will get better on their own. Home treatment may help you feel better faster. The doctor has checked you carefully, but problems can develop later. If you notice any problems or new symptoms, get medical treatment right away. Follow-up care is a key part of your treatment and safety. Be sure to make and go to all appointments, and call your doctor if you are having problems. It's also a good idea to know your test results and keep a list of the medicines you take. How can you care for yourself at home? · Do not drive if you have taken a prescription pain medicine. · Rest in a quiet, dark room until your headache is gone. Close your eyes and try to relax or go to sleep. Don't watch TV or read. · Put a cold, moist cloth or cold pack on the painful area for 10 to 20 minutes at a time. Put a thin cloth between the cold pack and your skin. · Use a warm, moist towel or a heating pad set on low to relax tight shoulder and neck muscles. · Have someone gently massage your neck and shoulders. · Take pain medicines exactly as directed. ¨ If the doctor gave you a prescription medicine for pain, take it as prescribed. ¨ If you are not taking a prescription pain medicine, ask your doctor if you can take an over-the-counter medicine. · Be careful not to take pain medicine more often than the instructions allow, because you may get worse or more frequent headaches when the medicine wears off. · Do not ignore new symptoms that occur with a headache, such as a fever, weakness or numbness, vision changes, or confusion. These may be signs of a more serious problem. To prevent headaches  · Keep a headache diary so you can figure out what triggers your headaches. Avoiding triggers may help you prevent headaches.  Record when each headache began, how long it lasted, and what the pain was like (throbbing, aching, stabbing, or dull). Write down any other symptoms you had with the headache, such as nausea, flashing lights or dark spots, or sensitivity to bright light or loud noise. Note if the headache occurred near your period. List anything that might have triggered the headache, such as certain foods (chocolate, cheese, wine) or odors, smoke, bright light, stress, or lack of sleep. · Find healthy ways to deal with stress. Headaches are most common during or right after stressful times. Take time to relax before and after you do something that has caused a headache in the past.  · Try to keep your muscles relaxed by keeping good posture. Check your jaw, face, neck, and shoulder muscles for tension, and try relaxing them. When sitting at a desk, change positions often, and stretch for 30 seconds each hour. · Get plenty of sleep and exercise. · Eat regularly and well. Long periods without food can trigger a headache. · Treat yourself to a massage. Some people find that regular massages are very helpful in relieving tension. · Limit caffeine by not drinking too much coffee, tea, or soda. But don't quit caffeine suddenly, because that can also give you headaches. · Reduce eyestrain from computers by blinking frequently and looking away from the computer screen every so often. Make sure you have proper eyewear and that your monitor is set up properly, about an arm's length away. · Seek help if you have depression or anxiety. Your headaches may be linked to these conditions. Treatment can both prevent headaches and help with symptoms of anxiety or depression. When should you call for help? Call 911 anytime you think you may need emergency care. For example, call if:  · You have signs of a stroke. These may include:  ¨ Sudden numbness, paralysis, or weakness in your face, arm, or leg, especially on only one side of your body. ¨ Sudden vision changes.   ¨ Sudden trouble speaking. ¨ Sudden confusion or trouble understanding simple statements. ¨ Sudden problems with walking or balance. ¨ A sudden, severe headache that is different from past headaches. Call your doctor now or seek immediate medical care if:  · You have a new or worse headache. · Your headache gets much worse. Where can you learn more? Go to http://debbie-queta.info/. Enter M271 in the search box to learn more about \"Headache: Care Instructions. \"  Current as of: October 14, 2016  Content Version: 11.3  © 5829-9824 [employer]. Care instructions adapted under license by [employer] (which disclaims liability or warranty for this information). If you have questions about a medical condition or this instruction, always ask your healthcare professional. [place] [address] any warranty or liability for your use of this information. Urinary Tract Infection in Women: Care Instructions  Your Care Instructions    A urinary tract infection, or UTI, is a general term for an infection anywhere between the kidneys and the urethra (where urine comes out). Most UTIs are bladder infections. They often cause pain or burning when you urinate. UTIs are caused by bacteria and can be cured with antibiotics. Be sure to complete your treatment so that the infection goes away. Follow-up care is a key part of your treatment and safety. Be sure to make and go to all appointments, and call your doctor if you are having problems. It's also a good idea to know your test results and keep a list of the medicines you take. How can you care for yourself at home? · Take your antibiotics as directed. Do not stop taking them just because you feel better. You need to take the full course of antibiotics. · Drink extra water and other fluids for the next day or two. This may help wash out the bacteria that are causing the infection.  (If you have kidney, heart, or liver disease and have to limit fluids, talk with your doctor before you increase your fluid intake.)  · Avoid drinks that are carbonated or have caffeine. They can irritate the bladder. · Urinate often. Try to empty your bladder each time. · To relieve pain, take a hot bath or lay a heating pad set on low over your lower belly or genital area. Never go to sleep with a heating pad in place. To prevent UTIs  · Drink plenty of water each day. This helps you urinate often, which clears bacteria from your system. (If you have kidney, heart, or liver disease and have to limit fluids, talk with your doctor before you increase your fluid intake.)  · Urinate when you need to. · Urinate right after you have sex. · Change sanitary pads often. · Avoid douches, bubble baths, feminine hygiene sprays, and other feminine hygiene products that have deodorants. · After going to the bathroom, wipe from front to back. When should you call for help? Call your doctor now or seek immediate medical care if:  · Symptoms such as fever, chills, nausea, or vomiting get worse or appear for the first time. · You have new pain in your back just below your rib cage. This is called flank pain. · There is new blood or pus in your urine. · You have any problems with your antibiotic medicine. Watch closely for changes in your health, and be sure to contact your doctor if:  · You are not getting better after taking an antibiotic for 2 days. · Your symptoms go away but then come back. Where can you learn more? Go to http://debbie-queta.info/. Enter M936 in the search box to learn more about \"Urinary Tract Infection in Women: Care Instructions. \"  Current as of: November 28, 2016  Content Version: 11.3  © 8529-4068 [employer]. Care instructions adapted under license by [employer] (which disclaims liability or warranty for this information).  If you have questions about a medical condition or this instruction, always ask your healthcare professional. [place] [address] any warranty or liability for your use of this information. We hope that we have addressed all of your medical concerns. The examination and treatment you received in the Emergency Department were for an emergent problem and were not intended as complete care. It is important that you follow up with your healthcare provider(s) for ongoing care. If your symptoms worsen or do not improve as expected, and you are unable to reach your usual health care provider(s), you should return to the Emergency Department. Today's healthcare is undergoing tremendous change, and patient satisfaction surveys are one of the many tools to assess the quality of medical care. You may receive a survey from the [employer] regarding your experience in the Emergency Department. I hope that your experience has been completely positive, particularly the medical care that I provided. As such, please participate in the survey; anything less than excellent does not meet my expectations or intentions. [facility] and [address] [place] [address] participate in nationally recognized quality of care measures. If your blood pressure is greater than 120/80, as reported below, we urge that you seek medical care to address the potential of high blood pressure, commonly known as hypertension. Hypertension can be hereditary or can be caused by certain medical conditions, pain, stress, or \"white coat syndrome. \"       Please make an appointment with your health care provider(s) for follow up of your Emergency Department visit. VITALS:   Patient Vitals for the past 8 hrs:   Temp Pulse Resp BP SpO2   09/07/17 1131 98.5 °F (36.9 °C) 79 16 177/89 100 %          Thank you for allowing us to provide you with medical care today. We realize that you have many choices for your emergency care needs.   Please choose us in the future for any continued health care needs. Irene Smyth, [address]   Office: [phone]            Recent Results (from the past 24 hour(s))   URINALYSIS W/MICROSCOPIC    Collection Time: 09/07/17 12:57 PM   Result Value Ref Range    Color YELLOW/STRAW      Appearance TURBID (A) CLEAR      Specific gravity 1.018 1.003 - 1.030      pH (UA) 7.0 5.0 - 8.0      Protein NEGATIVE  NEG mg/dL    Glucose NEGATIVE  NEG mg/dL    Ketone NEGATIVE  NEG mg/dL    Bilirubin NEGATIVE  NEG      Blood MODERATE (A) NEG      Urobilinogen 0.2 0.2 - 1.0 EU/dL    Nitrites POSITIVE (A) NEG      Leukocyte Esterase LARGE (A) NEG      WBC 20-50 0 - 4 /hpf    RBC 0-5 0 - 5 /hpf    Epithelial cells FEW FEW /lpf    Bacteria 4+ (A) NEG /hpf   CBC WITH AUTOMATED DIFF    Collection Time: 09/07/17 12:57 PM   Result Value Ref Range    WBC 5.6 3.6 - 11.0 K/uL    RBC 4.15 3.80 - 5.20 M/uL    HGB 11.4 (L) 11.5 - 16.0 g/dL    HCT 36.6 35.0 - 47.0 %    MCV 88.2 80.0 - 99.0 FL    MCH 27.5 26.0 - 34.0 PG    MCHC 31.1 30.0 - 36.5 g/dL    RDW 13.8 11.5 - 14.5 %    PLATELET 083 799 - 029 K/uL    NEUTROPHILS 62 32 - 75 %    LYMPHOCYTES 26 12 - 49 %    MONOCYTES 11 5 - 13 %    EOSINOPHILS 1 0 - 7 %    BASOPHILS 0 0 - 1 %    ABS. NEUTROPHILS 3.5 1.8 - 8.0 K/UL    ABS. LYMPHOCYTES 1.4 0.8 - 3.5 K/UL    ABS. MONOCYTES 0.6 0.0 - 1.0 K/UL    ABS. EOSINOPHILS 0.1 0.0 - 0.4 K/UL    ABS.  BASOPHILS 0.0 0.0 - 0.1 K/UL   METABOLIC PANEL, BASIC    Collection Time: 09/07/17 12:57 PM   Result Value Ref Range    Sodium 140 136 - 145 mmol/L    Potassium 4.0 3.5 - 5.1 mmol/L    Chloride 103 97 - 108 mmol/L    CO2 31 21 - 32 mmol/L    Anion gap 6 5 - 15 mmol/L    Glucose 117 (H) 65 - 100 mg/dL    BUN 16 6 - 20 MG/DL    Creatinine 0.85 0.55 - 1.02 MG/DL    BUN/Creatinine ratio 19 12 - 20      GFR est AA >60 >60 ml/min/1.73m2    GFR est non-AA >60 >60 ml/min/1.73m2    Calcium 9.1 8.5 - 10.1 MG/DL       Ct Head Wo Cont    Result Date: 9/7/2017  EXAM:  CT HEAD WO CONT INDICATION:   headache since last night COMPARISON: None. CONTRAST:  None. TECHNIQUE: Unenhanced CT of the head was performed using 5 mm images. Brain and bone windows were generated. CT dose reduction was achieved through use of a standardized protocol tailored for this examination and automatic exposure control for dose modulation. FINDINGS: The ventricles and sulci are normal in size, shape and configuration and midline. There is moderate hypodensity of the cerebral white matter, likely due to intracranial small vessel disease. . There is no intracranial hemorrhage, extra-axial collection, mass, mass effect or midline shift. The basilar cisterns are open. No acute infarct is identified. The bone windows demonstrate no abnormalities. The visualized portions of the paranasal sinuses and mastoid air cells are clear. IMPRESSION: No acute findings. Hypodensity of the cerebral white matter, likely due to intracranial small vessel disease.

## 2017-09-09 LAB
BACTERIA SPEC CULT: ABNORMAL
CC UR VC: ABNORMAL
SERVICE CMNT-IMP: ABNORMAL

## 2018-07-24 ENCOUNTER — HOSPITAL ENCOUNTER (OUTPATIENT)
Dept: NON INVASIVE DIAGNOSTICS | Age: 73
Discharge: HOME | End: 2018-07-24
Attending: FAMILY MEDICINE
Payer: MEDICARE

## 2018-07-24 DIAGNOSIS — I10 BENIGN ESSENTIAL HYPERTENSION: ICD-10-CM

## 2018-07-24 DIAGNOSIS — I10 ESSENTIAL HYPERTENSION, MALIGNANT: ICD-10-CM

## 2018-07-24 DIAGNOSIS — I35.8 AORTIC VALVE SCLEROSIS: ICD-10-CM

## 2018-07-24 DIAGNOSIS — I35.8 AORTIC VALVE ENDOCARDITIS: ICD-10-CM

## 2018-07-24 PROCEDURE — 93306 TTE W/DOPPLER COMPLETE: CPT

## 2018-07-30 ENCOUNTER — HOSPITAL ENCOUNTER (OUTPATIENT)
Dept: MAMMOGRAPHY | Age: 73
Discharge: HOME | End: 2018-07-30
Attending: FAMILY MEDICINE
Payer: MEDICARE

## 2018-07-30 DIAGNOSIS — Z12.39 BREAST SCREENING: ICD-10-CM

## 2018-07-30 PROCEDURE — 77067 SCR MAMMO BI INCL CAD: CPT

## 2019-01-08 ENCOUNTER — HOSPITAL ENCOUNTER (EMERGENCY)
Age: 74
Discharge: HOME | End: 2019-01-08
Attending: STUDENT IN AN ORGANIZED HEALTH CARE EDUCATION/TRAINING PROGRAM
Payer: MEDICARE

## 2019-01-08 VITALS
HEART RATE: 79 BPM | DIASTOLIC BLOOD PRESSURE: 93 MMHG | RESPIRATION RATE: 16 BRPM | BODY MASS INDEX: 39.2 KG/M2 | WEIGHT: 280 LBS | SYSTOLIC BLOOD PRESSURE: 201 MMHG | OXYGEN SATURATION: 96 % | TEMPERATURE: 97.7 F | HEIGHT: 71 IN

## 2019-01-08 DIAGNOSIS — Z76.0 MEDICATION REFILL: Primary | ICD-10-CM

## 2019-01-08 PROCEDURE — 74011250637 HC RX REV CODE- 250/637: Performed by: STUDENT IN AN ORGANIZED HEALTH CARE EDUCATION/TRAINING PROGRAM

## 2019-01-08 PROCEDURE — 99283 EMERGENCY DEPT VISIT LOW MDM: CPT

## 2019-01-08 RX ORDER — NIFEDIPINE 10 MG/1
10 CAPSULE ORAL 3 TIMES DAILY
COMMUNITY
End: 2020-06-21

## 2019-01-08 RX ORDER — GABAPENTIN 300 MG/1
600 CAPSULE ORAL
Status: COMPLETED | OUTPATIENT
Start: 2019-01-08 | End: 2019-01-08

## 2019-01-08 RX ORDER — GABAPENTIN 600 MG/1
600 TABLET ORAL 3 TIMES DAILY
Qty: 21 TAB | Refills: 0 | Status: SHIPPED | OUTPATIENT
Start: 2019-01-08 | End: 2019-01-15

## 2019-01-08 RX ORDER — HYDRALAZINE HYDROCHLORIDE 25 MG/1
25 TABLET, FILM COATED ORAL
Status: COMPLETED | OUTPATIENT
Start: 2019-01-08 | End: 2019-01-08

## 2019-01-08 RX ADMIN — HYDRALAZINE HYDROCHLORIDE 25 MG: 25 TABLET, FILM COATED ORAL at 15:41

## 2019-01-08 RX ADMIN — GABAPENTIN 600 MG: 300 CAPSULE ORAL at 15:41

## 2019-01-08 NOTE — ED TRIAGE NOTES
Pt with chronic left knee pain s/p knee replacment several years ago and worsening since she ran out of her gabapentin.

## 2019-01-08 NOTE — DISCHARGE INSTRUCTIONS
Patient Education        Medication Refill: Care Instructions  Your Care Instructions    Medicines are a big part of treatment for many health problems. So it can be upsetting to run out of your medicine. It may even be dangerous to stop a medicine suddenly. The doctor may have given you enough medicine to help you until you can see your regular doctor. The doctor has checked you carefully, but problems can develop later. If you notice any problems or new symptoms, get medical treatment right away. Follow-up care is a key part of your treatment and safety. Be sure to make and go to all appointments, and call your doctor if you are having problems. It's also a good idea to know your test results and keep a list of the medicines you take. How can you care for yourself at home? · Be safe with medicines. Take your medicines exactly as prescribed. · Know when you will run out of your medicine. Use a calendar to remind you to get a refill. Don't wait until you have only a few pills left. · Talk with your doctor or pharmacist about your medicine. Find out what to do if you miss a dose. When should you call for help? Call your doctor now or seek immediate medical care if:    · You have problems with your medicine.    Watch closely for changes in your health, and be sure to contact your doctor if you have any problems. Where can you learn more? Go to http://debbie-queta.info/. Enter K326 in the search box to learn more about \"Medication Refill: Care Instructions. \"  Current as of: March 29, 2018  Content Version: 11.8  © 2694-2801 [employer]. Care instructions adapted under license by [employer] (which disclaims liability or warranty for this information). If you have questions about a medical condition or this instruction, always ask your healthcare professional. [address] any warranty or liability for your use of this information.

## 2019-01-23 ENCOUNTER — HOSPITAL ENCOUNTER (OUTPATIENT)
Dept: CT IMAGING | Age: 74
Discharge: HOME | End: 2019-01-23
Attending: ORTHOPAEDIC SURGERY
Payer: MEDICARE

## 2019-01-23 DIAGNOSIS — Z47.89 ORTHOPEDIC AFTERCARE: ICD-10-CM

## 2019-01-23 PROCEDURE — 73700 CT LOWER EXTREMITY W/O DYE: CPT

## 2019-06-20 ENCOUNTER — HOSPITAL ENCOUNTER (EMERGENCY)
Age: 74
Discharge: HOME | End: 2019-06-20
Attending: EMERGENCY MEDICINE
Payer: MEDICARE

## 2019-06-20 VITALS
TEMPERATURE: 97.6 F | HEART RATE: 68 BPM | DIASTOLIC BLOOD PRESSURE: 105 MMHG | RESPIRATION RATE: 18 BRPM | HEIGHT: 71 IN | SYSTOLIC BLOOD PRESSURE: 223 MMHG | OXYGEN SATURATION: 98 % | WEIGHT: 285 LBS | BODY MASS INDEX: 39.9 KG/M2

## 2019-06-20 DIAGNOSIS — N30.00 ACUTE CYSTITIS WITHOUT HEMATURIA: Primary | ICD-10-CM

## 2019-06-20 LAB
APPEARANCE UR: ABNORMAL
BACTERIA URNS QL MICRO: ABNORMAL /HPF
BILIRUB UR QL: NEGATIVE
COLOR UR: ABNORMAL
EPITH CASTS URNS QL MICRO: ABNORMAL /LPF
GLUCOSE UR STRIP.AUTO-MCNC: NEGATIVE MG/DL
HGB UR QL STRIP: NEGATIVE
KETONES UR QL STRIP.AUTO: NEGATIVE MG/DL
LEUKOCYTE ESTERASE UR QL STRIP.AUTO: ABNORMAL
NITRITE UR QL STRIP.AUTO: POSITIVE
PH UR STRIP: 6.5 [PH] (ref 5–8)
PROT UR STRIP-MCNC: NEGATIVE MG/DL
RBC #/AREA URNS HPF: ABNORMAL /HPF (ref 0–5)
SP GR UR REFRACTOMETRY: 1.02 (ref 1–1.03)
UR CULT HOLD, URHOLD: NORMAL
UROBILINOGEN UR QL STRIP.AUTO: 1 EU/DL (ref 0.2–1)
WBC URNS QL MICRO: ABNORMAL /HPF (ref 0–4)

## 2019-06-20 PROCEDURE — 87077 CULTURE AEROBIC IDENTIFY: CPT

## 2019-06-20 PROCEDURE — 87186 SC STD MICRODIL/AGAR DIL: CPT

## 2019-06-20 PROCEDURE — 81001 URINALYSIS AUTO W/SCOPE: CPT

## 2019-06-20 PROCEDURE — 99282 EMERGENCY DEPT VISIT SF MDM: CPT

## 2019-06-20 PROCEDURE — 87086 URINE CULTURE/COLONY COUNT: CPT

## 2019-06-20 RX ORDER — CEPHALEXIN 500 MG/1
500 CAPSULE ORAL 3 TIMES DAILY
Qty: 21 CAP | Refills: 0 | Status: SHIPPED | OUTPATIENT
Start: 2019-06-20 | End: 2019-06-27

## 2019-06-20 NOTE — ED PROVIDER NOTES
76 y.o. female with past medical history significant for Arthritis, Hypertension, and Asthma who presents from home with chief complaint of urinary problems. Patient reports onset a week ago of increased urinary frequency and urgency. Patient reports accompanying lower back pain that is chronic, and currently rated as 8/10 in severity. Patient reports having h/o \"back surgery\" but is unable to describe this further. Patient notes having a GLF two weeks ago. Pt denies fever, chills, cough, congestion, shortness of breath, chest pain, abdominal pain, nausea, vomiting, diarrhea, blood in stool, hematuria, difficulty with urination or dysuria. Uncertain if she took her BP medications today. There are no other acute medical concerns at this time. PCP: Senthil Orozco DO    Note written by Magdiel Little, as dictated by Tabitha Gutiérrez DO 11:10 AM      The history is provided by the patient. Past Medical History:   Diagnosis Date    Arthritis     Asthma     Hypertension        Past Surgical History:   Procedure Laterality Date    HX ORTHOPAEDIC           No family history on file.     Social History     Socioeconomic History    Marital status: SINGLE     Spouse name: Not on file    Number of children: Not on file    Years of education: Not on file    Highest education level: Not on file   Occupational History    Not on file   Social Needs    Financial resource strain: Not on file    Food insecurity:     Worry: Not on file     Inability: Not on file    Transportation needs:     Medical: Not on file     Non-medical: Not on file   Tobacco Use    Smoking status: Never Smoker   Substance and Sexual Activity    Alcohol use: No    Drug use: No    Sexual activity: Not on file   Lifestyle    Physical activity:     Days per week: Not on file     Minutes per session: Not on file    Stress: Not on file   Relationships    Social connections:     Talks on phone: Not on file     Gets together: Not on file     Attends Yazidism service: Not on file     Active member of club or organization: Not on file     Attends meetings of clubs or organizations: Not on file     Relationship status: Not on file    Intimate partner violence:     Fear of current or ex partner: Not on file     Emotionally abused: Not on file     Physically abused: Not on file     Forced sexual activity: Not on file   Other Topics Concern    Not on file   Social History Narrative    Not on file         ALLERGIES: Morphine    Review of Systems   Constitutional: Negative for activity change, appetite change, chills and fever. HENT: Negative for congestion, rhinorrhea, sinus pressure, sneezing and sore throat. Eyes: Negative for photophobia and visual disturbance. Respiratory: Negative for cough and shortness of breath. Cardiovascular: Negative for chest pain. Gastrointestinal: Negative for abdominal pain, blood in stool, constipation, diarrhea, nausea and vomiting. Genitourinary: Positive for frequency and urgency. Negative for difficulty urinating, dysuria, flank pain, hematuria, menstrual problem, vaginal bleeding and vaginal discharge. Musculoskeletal: Positive for back pain (chronic). Negative for arthralgias, myalgias and neck pain. Skin: Negative for rash and wound. Neurological: Negative for syncope, weakness, numbness and headaches. Psychiatric/Behavioral: Negative for self-injury and suicidal ideas. All other systems reviewed and are negative. Vitals:    06/20/19 1112   BP: (!) 223/105   Pulse: 68   Resp: 18   Temp: 97.6 °F (36.4 °C)   SpO2: 98%   Weight: 129.3 kg (285 lb)   Height: 5' 11\" (1.803 m)            Physical Exam   Constitutional: She is oriented to person, place, and time. She appears well-developed and well-nourished. No distress. HENT:   Head: Normocephalic and atraumatic. Eyes: Pupils are equal, round, and reactive to light. Conjunctivae and EOM are normal.   Neck: Neck supple. Cardiovascular: Normal rate, regular rhythm and normal heart sounds. Pulmonary/Chest: Effort normal and breath sounds normal.   Abdominal: Soft. She exhibits no distension. There is no tenderness. Musculoskeletal: She exhibits no edema. Tenderness in mid back and paraspinal musculature which is chronic   Neurological: She is alert and oriented to person, place, and time. Skin: Skin is warm and dry. She is not diaphoretic. Nursing note and vitals reviewed. Note written by Magdiel Gallo, as dictated by No att. providers found 11:10 AM      MDM    76 y.o. female presents with concern for UTI. Benign abdomen. Afebrile with VS significant for HTN, advised to take her BP medications as rx'd No tachycardia. No distress noted. UA shows UTI. Rx'd course of keflex, sent for urine culture. Rec'd PCP f/u. Return precautions were given for worsening or concerns.      Procedures

## 2019-06-20 NOTE — ED NOTES
The patient was given discharge instructions and questions were answered. Patient is in no apparent distress at time of discharge. Wheeled to DC area by RN and daughter present to drive her home.

## 2019-06-20 NOTE — ED NOTES
Patient attempted to provide urine specimen for approximately 20+ minutes, but was unable. MD aware. PO fluids to be provided.

## 2019-06-24 LAB
BACTERIA SPEC CULT: ABNORMAL
BACTERIA SPEC CULT: ABNORMAL
CC UR VC: ABNORMAL
SERVICE CMNT-IMP: ABNORMAL

## 2020-04-20 ENCOUNTER — HOME HEALTH ADMISSION (OUTPATIENT)
Dept: HOME HEALTH SERVICES | Facility: HOME HEALTH | Age: 75
End: 2020-04-20
Payer: MEDICARE

## 2020-04-23 ENCOUNTER — HOME CARE VISIT (OUTPATIENT)
Dept: HOME HEALTH SERVICES | Facility: HOME HEALTH | Age: 75
End: 2020-04-23

## 2020-04-23 ENCOUNTER — HOME CARE VISIT (OUTPATIENT)
Dept: SCHEDULING | Facility: HOME HEALTH | Age: 75
End: 2020-04-23
Payer: MEDICARE

## 2020-04-23 VITALS
RESPIRATION RATE: 18 BRPM | HEIGHT: 72 IN | OXYGEN SATURATION: 97 % | HEART RATE: 78 BPM | DIASTOLIC BLOOD PRESSURE: 72 MMHG | BODY MASS INDEX: 37.93 KG/M2 | SYSTOLIC BLOOD PRESSURE: 142 MMHG | TEMPERATURE: 98.2 F | WEIGHT: 280 LBS

## 2020-04-23 PROCEDURE — 3331090001 HH PPS REVENUE CREDIT

## 2020-04-23 PROCEDURE — 400013 HH SOC

## 2020-04-23 PROCEDURE — G0299 HHS/HOSPICE OF RN EA 15 MIN: HCPCS

## 2020-04-23 PROCEDURE — 3331090002 HH PPS REVENUE DEBIT

## 2020-04-23 PROCEDURE — G0151 HHCP-SERV OF PT,EA 15 MIN: HCPCS

## 2020-04-24 ENCOUNTER — HOME CARE VISIT (OUTPATIENT)
Dept: HOME HEALTH SERVICES | Facility: HOME HEALTH | Age: 75
End: 2020-04-24
Payer: MEDICARE

## 2020-04-24 VITALS
DIASTOLIC BLOOD PRESSURE: 98 MMHG | OXYGEN SATURATION: 98 % | HEART RATE: 63 BPM | TEMPERATURE: 98.3 F | SYSTOLIC BLOOD PRESSURE: 158 MMHG | RESPIRATION RATE: 12 BRPM

## 2020-04-24 PROCEDURE — 3331090002 HH PPS REVENUE DEBIT

## 2020-04-24 PROCEDURE — 3331090001 HH PPS REVENUE CREDIT

## 2020-04-25 PROCEDURE — 3331090001 HH PPS REVENUE CREDIT

## 2020-04-25 PROCEDURE — 3331090002 HH PPS REVENUE DEBIT

## 2020-04-26 PROCEDURE — 3331090001 HH PPS REVENUE CREDIT

## 2020-04-26 PROCEDURE — 3331090002 HH PPS REVENUE DEBIT

## 2020-04-27 ENCOUNTER — HOME CARE VISIT (OUTPATIENT)
Dept: SCHEDULING | Facility: HOME HEALTH | Age: 75
End: 2020-04-27
Payer: MEDICARE

## 2020-04-27 ENCOUNTER — HOME CARE VISIT (OUTPATIENT)
Dept: HOME HEALTH SERVICES | Facility: HOME HEALTH | Age: 75
End: 2020-04-27
Payer: MEDICARE

## 2020-04-27 VITALS
SYSTOLIC BLOOD PRESSURE: 150 MMHG | HEART RATE: 69 BPM | TEMPERATURE: 97.4 F | DIASTOLIC BLOOD PRESSURE: 82 MMHG | RESPIRATION RATE: 12 BRPM | OXYGEN SATURATION: 93 %

## 2020-04-27 PROCEDURE — 3331090002 HH PPS REVENUE DEBIT

## 2020-04-27 PROCEDURE — G0151 HHCP-SERV OF PT,EA 15 MIN: HCPCS

## 2020-04-27 PROCEDURE — 3331090001 HH PPS REVENUE CREDIT

## 2020-04-28 PROCEDURE — 3331090001 HH PPS REVENUE CREDIT

## 2020-04-28 PROCEDURE — 3331090002 HH PPS REVENUE DEBIT

## 2020-04-29 PROCEDURE — 3331090002 HH PPS REVENUE DEBIT

## 2020-04-29 PROCEDURE — 3331090001 HH PPS REVENUE CREDIT

## 2020-04-30 ENCOUNTER — HOME CARE VISIT (OUTPATIENT)
Dept: HOME HEALTH SERVICES | Facility: HOME HEALTH | Age: 75
End: 2020-04-30
Payer: MEDICARE

## 2020-04-30 ENCOUNTER — HOME CARE VISIT (OUTPATIENT)
Dept: SCHEDULING | Facility: HOME HEALTH | Age: 75
End: 2020-04-30
Payer: MEDICARE

## 2020-04-30 VITALS
DIASTOLIC BLOOD PRESSURE: 88 MMHG | OXYGEN SATURATION: 97 % | SYSTOLIC BLOOD PRESSURE: 158 MMHG | RESPIRATION RATE: 12 BRPM | TEMPERATURE: 97.9 F | HEART RATE: 73 BPM

## 2020-04-30 VITALS
TEMPERATURE: 97.6 F | SYSTOLIC BLOOD PRESSURE: 160 MMHG | OXYGEN SATURATION: 92 % | BODY MASS INDEX: 28.58 KG/M2 | DIASTOLIC BLOOD PRESSURE: 82 MMHG | HEART RATE: 69 BPM | WEIGHT: 280 LBS

## 2020-04-30 PROCEDURE — G0151 HHCP-SERV OF PT,EA 15 MIN: HCPCS

## 2020-04-30 PROCEDURE — 3331090001 HH PPS REVENUE CREDIT

## 2020-04-30 PROCEDURE — G0152 HHCP-SERV OF OT,EA 15 MIN: HCPCS

## 2020-04-30 PROCEDURE — 3331090002 HH PPS REVENUE DEBIT

## 2020-05-01 PROCEDURE — 3331090002 HH PPS REVENUE DEBIT

## 2020-05-01 PROCEDURE — 3331090001 HH PPS REVENUE CREDIT

## 2020-05-02 PROCEDURE — 3331090002 HH PPS REVENUE DEBIT

## 2020-05-02 PROCEDURE — 3331090001 HH PPS REVENUE CREDIT

## 2020-05-03 PROCEDURE — 3331090002 HH PPS REVENUE DEBIT

## 2020-05-03 PROCEDURE — 3331090001 HH PPS REVENUE CREDIT

## 2020-05-04 ENCOUNTER — HOME CARE VISIT (OUTPATIENT)
Dept: HOME HEALTH SERVICES | Facility: HOME HEALTH | Age: 75
End: 2020-05-04
Payer: MEDICARE

## 2020-05-04 ENCOUNTER — HOME CARE VISIT (OUTPATIENT)
Dept: SCHEDULING | Facility: HOME HEALTH | Age: 75
End: 2020-05-04
Payer: MEDICARE

## 2020-05-04 VITALS
SYSTOLIC BLOOD PRESSURE: 158 MMHG | DIASTOLIC BLOOD PRESSURE: 80 MMHG | RESPIRATION RATE: 14 BRPM | TEMPERATURE: 98.2 F | HEART RATE: 72 BPM | OXYGEN SATURATION: 98 %

## 2020-05-04 VITALS
SYSTOLIC BLOOD PRESSURE: 190 MMHG | HEART RATE: 71 BPM | RESPIRATION RATE: 16 BRPM | DIASTOLIC BLOOD PRESSURE: 105 MMHG | TEMPERATURE: 98 F | OXYGEN SATURATION: 97 %

## 2020-05-04 PROCEDURE — A5120 SKIN BARRIER, WIPE OR SWAB: HCPCS

## 2020-05-04 PROCEDURE — 3331090001 HH PPS REVENUE CREDIT

## 2020-05-04 PROCEDURE — A6250 SKIN SEAL PROTECT MOISTURIZR: HCPCS

## 2020-05-04 PROCEDURE — 3331090002 HH PPS REVENUE DEBIT

## 2020-05-04 PROCEDURE — G0151 HHCP-SERV OF PT,EA 15 MIN: HCPCS

## 2020-05-04 PROCEDURE — A4216 STERILE WATER/SALINE, 10 ML: HCPCS

## 2020-05-04 PROCEDURE — G0299 HHS/HOSPICE OF RN EA 15 MIN: HCPCS

## 2020-05-04 PROCEDURE — A6212 FOAM DRG <=16 SQ IN W/BORDER: HCPCS

## 2020-05-04 PROCEDURE — G0152 HHCP-SERV OF OT,EA 15 MIN: HCPCS

## 2020-05-04 PROCEDURE — A6216 NON-STERILE GAUZE<=16 SQ IN: HCPCS

## 2020-05-05 VITALS
OXYGEN SATURATION: 97 % | RESPIRATION RATE: 18 BRPM | HEART RATE: 78 BPM | TEMPERATURE: 97.8 F | DIASTOLIC BLOOD PRESSURE: 89 MMHG | SYSTOLIC BLOOD PRESSURE: 158 MMHG

## 2020-05-05 PROCEDURE — 3331090001 HH PPS REVENUE CREDIT

## 2020-05-05 PROCEDURE — 3331090002 HH PPS REVENUE DEBIT

## 2020-05-06 PROCEDURE — 3331090002 HH PPS REVENUE DEBIT

## 2020-05-06 PROCEDURE — 3331090001 HH PPS REVENUE CREDIT

## 2020-05-07 ENCOUNTER — HOME CARE VISIT (OUTPATIENT)
Dept: HOME HEALTH SERVICES | Facility: HOME HEALTH | Age: 75
End: 2020-05-07
Payer: MEDICARE

## 2020-05-07 ENCOUNTER — HOME CARE VISIT (OUTPATIENT)
Dept: SCHEDULING | Facility: HOME HEALTH | Age: 75
End: 2020-05-07
Payer: MEDICARE

## 2020-05-07 VITALS
HEART RATE: 53 BPM | SYSTOLIC BLOOD PRESSURE: 122 MMHG | OXYGEN SATURATION: 97 % | RESPIRATION RATE: 12 BRPM | DIASTOLIC BLOOD PRESSURE: 78 MMHG | TEMPERATURE: 97.9 F

## 2020-05-07 PROCEDURE — 3331090001 HH PPS REVENUE CREDIT

## 2020-05-07 PROCEDURE — G0151 HHCP-SERV OF PT,EA 15 MIN: HCPCS

## 2020-05-07 PROCEDURE — 3331090002 HH PPS REVENUE DEBIT

## 2020-05-07 PROCEDURE — G0152 HHCP-SERV OF OT,EA 15 MIN: HCPCS

## 2020-05-08 VITALS
HEART RATE: 53 BPM | DIASTOLIC BLOOD PRESSURE: 78 MMHG | RESPIRATION RATE: 16 BRPM | TEMPERATURE: 97.9 F | OXYGEN SATURATION: 97 % | SYSTOLIC BLOOD PRESSURE: 122 MMHG

## 2020-05-08 PROCEDURE — 3331090001 HH PPS REVENUE CREDIT

## 2020-05-08 PROCEDURE — 3331090002 HH PPS REVENUE DEBIT

## 2020-05-09 PROCEDURE — 3331090001 HH PPS REVENUE CREDIT

## 2020-05-09 PROCEDURE — 3331090002 HH PPS REVENUE DEBIT

## 2020-05-10 PROCEDURE — 3331090002 HH PPS REVENUE DEBIT

## 2020-05-10 PROCEDURE — 3331090001 HH PPS REVENUE CREDIT

## 2020-05-11 ENCOUNTER — HOME CARE VISIT (OUTPATIENT)
Dept: HOME HEALTH SERVICES | Facility: HOME HEALTH | Age: 75
End: 2020-05-11
Payer: MEDICARE

## 2020-05-11 PROCEDURE — 3331090001 HH PPS REVENUE CREDIT

## 2020-05-11 PROCEDURE — 3331090002 HH PPS REVENUE DEBIT

## 2020-05-12 PROCEDURE — 3331090002 HH PPS REVENUE DEBIT

## 2020-05-12 PROCEDURE — 3331090001 HH PPS REVENUE CREDIT

## 2020-05-13 PROCEDURE — 3331090002 HH PPS REVENUE DEBIT

## 2020-05-13 PROCEDURE — 3331090001 HH PPS REVENUE CREDIT

## 2020-05-14 PROCEDURE — 3331090002 HH PPS REVENUE DEBIT

## 2020-05-14 PROCEDURE — 3331090001 HH PPS REVENUE CREDIT

## 2020-05-15 ENCOUNTER — HOME CARE VISIT (OUTPATIENT)
Dept: SCHEDULING | Facility: HOME HEALTH | Age: 75
End: 2020-05-15
Payer: MEDICARE

## 2020-05-15 PROCEDURE — G0299 HHS/HOSPICE OF RN EA 15 MIN: HCPCS

## 2020-05-15 PROCEDURE — 3331090001 HH PPS REVENUE CREDIT

## 2020-05-15 PROCEDURE — 3331090002 HH PPS REVENUE DEBIT

## 2020-05-16 PROCEDURE — 3331090002 HH PPS REVENUE DEBIT

## 2020-05-16 PROCEDURE — 3331090001 HH PPS REVENUE CREDIT

## 2020-05-17 VITALS
OXYGEN SATURATION: 97 % | DIASTOLIC BLOOD PRESSURE: 78 MMHG | HEART RATE: 75 BPM | RESPIRATION RATE: 18 BRPM | TEMPERATURE: 98.3 F | SYSTOLIC BLOOD PRESSURE: 136 MMHG

## 2020-05-17 PROCEDURE — 3331090002 HH PPS REVENUE DEBIT

## 2020-05-17 PROCEDURE — 3331090001 HH PPS REVENUE CREDIT

## 2020-05-18 PROCEDURE — 3331090002 HH PPS REVENUE DEBIT

## 2020-05-18 PROCEDURE — 3331090001 HH PPS REVENUE CREDIT

## 2020-05-19 ENCOUNTER — HOME CARE VISIT (OUTPATIENT)
Dept: SCHEDULING | Facility: HOME HEALTH | Age: 75
End: 2020-05-19
Payer: MEDICARE

## 2020-05-19 ENCOUNTER — HOME CARE VISIT (OUTPATIENT)
Dept: HOME HEALTH SERVICES | Facility: HOME HEALTH | Age: 75
End: 2020-05-19
Payer: MEDICARE

## 2020-05-19 PROCEDURE — 3331090001 HH PPS REVENUE CREDIT

## 2020-05-19 PROCEDURE — G0152 HHCP-SERV OF OT,EA 15 MIN: HCPCS

## 2020-05-19 PROCEDURE — 3331090002 HH PPS REVENUE DEBIT

## 2020-05-20 VITALS
OXYGEN SATURATION: 98 % | DIASTOLIC BLOOD PRESSURE: 100 MMHG | HEART RATE: 88 BPM | SYSTOLIC BLOOD PRESSURE: 156 MMHG | TEMPERATURE: 98.5 F

## 2020-05-20 PROCEDURE — 3331090001 HH PPS REVENUE CREDIT

## 2020-05-20 PROCEDURE — 3331090002 HH PPS REVENUE DEBIT

## 2020-05-21 ENCOUNTER — HOME CARE VISIT (OUTPATIENT)
Dept: HOME HEALTH SERVICES | Facility: HOME HEALTH | Age: 75
End: 2020-05-21
Payer: MEDICARE

## 2020-05-21 ENCOUNTER — HOME CARE VISIT (OUTPATIENT)
Dept: SCHEDULING | Facility: HOME HEALTH | Age: 75
End: 2020-05-21
Payer: MEDICARE

## 2020-05-21 VITALS
DIASTOLIC BLOOD PRESSURE: 102 MMHG | OXYGEN SATURATION: 97 % | TEMPERATURE: 99.2 F | SYSTOLIC BLOOD PRESSURE: 180 MMHG | HEART RATE: 73 BPM

## 2020-05-21 PROCEDURE — 3331090001 HH PPS REVENUE CREDIT

## 2020-05-21 PROCEDURE — 3331090002 HH PPS REVENUE DEBIT

## 2020-05-21 PROCEDURE — G0152 HHCP-SERV OF OT,EA 15 MIN: HCPCS

## 2020-05-22 ENCOUNTER — HOME CARE VISIT (OUTPATIENT)
Dept: SCHEDULING | Facility: HOME HEALTH | Age: 75
End: 2020-05-22
Payer: MEDICARE

## 2020-05-22 VITALS
SYSTOLIC BLOOD PRESSURE: 178 MMHG | TEMPERATURE: 98.1 F | OXYGEN SATURATION: 98 % | DIASTOLIC BLOOD PRESSURE: 90 MMHG | HEART RATE: 82 BPM | RESPIRATION RATE: 18 BRPM

## 2020-05-22 PROCEDURE — 3331090002 HH PPS REVENUE DEBIT

## 2020-05-22 PROCEDURE — 3331090001 HH PPS REVENUE CREDIT

## 2020-05-22 PROCEDURE — G0300 HHS/HOSPICE OF LPN EA 15 MIN: HCPCS

## 2020-05-23 PROCEDURE — 3331090002 HH PPS REVENUE DEBIT

## 2020-05-23 PROCEDURE — 3331090001 HH PPS REVENUE CREDIT

## 2020-05-24 ENCOUNTER — HOME CARE VISIT (OUTPATIENT)
Dept: SCHEDULING | Facility: HOME HEALTH | Age: 75
End: 2020-05-24
Payer: MEDICARE

## 2020-05-24 PROCEDURE — 3331090002 HH PPS REVENUE DEBIT

## 2020-05-24 PROCEDURE — 3331090001 HH PPS REVENUE CREDIT

## 2020-05-25 PROCEDURE — 3331090002 HH PPS REVENUE DEBIT

## 2020-05-25 PROCEDURE — 3331090001 HH PPS REVENUE CREDIT

## 2020-05-26 ENCOUNTER — HOME CARE VISIT (OUTPATIENT)
Dept: HOME HEALTH SERVICES | Facility: HOME HEALTH | Age: 75
End: 2020-05-26
Payer: MEDICARE

## 2020-05-26 ENCOUNTER — HOME CARE VISIT (OUTPATIENT)
Dept: SCHEDULING | Facility: HOME HEALTH | Age: 75
End: 2020-05-26
Payer: MEDICARE

## 2020-05-26 VITALS
HEART RATE: 56 BPM | DIASTOLIC BLOOD PRESSURE: 96 MMHG | RESPIRATION RATE: 18 BRPM | SYSTOLIC BLOOD PRESSURE: 148 MMHG | OXYGEN SATURATION: 98 % | TEMPERATURE: 97.9 F

## 2020-05-26 PROCEDURE — 3331090001 HH PPS REVENUE CREDIT

## 2020-05-26 PROCEDURE — 3331090002 HH PPS REVENUE DEBIT

## 2020-05-26 PROCEDURE — G0152 HHCP-SERV OF OT,EA 15 MIN: HCPCS

## 2020-05-26 PROCEDURE — 400013 HH SOC

## 2020-05-27 PROCEDURE — 3331090002 HH PPS REVENUE DEBIT

## 2020-05-27 PROCEDURE — 3331090001 HH PPS REVENUE CREDIT

## 2020-05-28 ENCOUNTER — HOME CARE VISIT (OUTPATIENT)
Dept: SCHEDULING | Facility: HOME HEALTH | Age: 75
End: 2020-05-28
Payer: MEDICARE

## 2020-05-28 PROCEDURE — 3331090001 HH PPS REVENUE CREDIT

## 2020-05-28 PROCEDURE — 3331090002 HH PPS REVENUE DEBIT

## 2020-05-29 ENCOUNTER — HOME CARE VISIT (OUTPATIENT)
Dept: SCHEDULING | Facility: HOME HEALTH | Age: 75
End: 2020-05-29
Payer: MEDICARE

## 2020-05-29 ENCOUNTER — HOME CARE VISIT (OUTPATIENT)
Dept: HOME HEALTH SERVICES | Facility: HOME HEALTH | Age: 75
End: 2020-05-29
Payer: MEDICARE

## 2020-05-29 PROCEDURE — G0152 HHCP-SERV OF OT,EA 15 MIN: HCPCS

## 2020-05-29 PROCEDURE — 3331090001 HH PPS REVENUE CREDIT

## 2020-05-29 PROCEDURE — 3331090002 HH PPS REVENUE DEBIT

## 2020-05-30 VITALS
SYSTOLIC BLOOD PRESSURE: 164 MMHG | TEMPERATURE: 97.8 F | DIASTOLIC BLOOD PRESSURE: 100 MMHG | HEART RATE: 54 BPM | OXYGEN SATURATION: 97 %

## 2020-05-30 PROCEDURE — 3331090002 HH PPS REVENUE DEBIT

## 2020-05-30 PROCEDURE — 3331090001 HH PPS REVENUE CREDIT

## 2020-05-31 PROCEDURE — 3331090001 HH PPS REVENUE CREDIT

## 2020-05-31 PROCEDURE — 3331090002 HH PPS REVENUE DEBIT

## 2020-06-01 ENCOUNTER — HOME CARE VISIT (OUTPATIENT)
Dept: SCHEDULING | Facility: HOME HEALTH | Age: 75
End: 2020-06-01
Payer: MEDICARE

## 2020-06-01 ENCOUNTER — HOME CARE VISIT (OUTPATIENT)
Dept: HOME HEALTH SERVICES | Facility: HOME HEALTH | Age: 75
End: 2020-06-01
Payer: MEDICARE

## 2020-06-01 VITALS
TEMPERATURE: 97.4 F | DIASTOLIC BLOOD PRESSURE: 90 MMHG | SYSTOLIC BLOOD PRESSURE: 142 MMHG | HEART RATE: 72 BPM | RESPIRATION RATE: 18 BRPM | OXYGEN SATURATION: 97 %

## 2020-06-01 PROCEDURE — 3331090001 HH PPS REVENUE CREDIT

## 2020-06-01 PROCEDURE — G0152 HHCP-SERV OF OT,EA 15 MIN: HCPCS

## 2020-06-01 PROCEDURE — 3331090002 HH PPS REVENUE DEBIT

## 2020-06-02 PROCEDURE — 3331090002 HH PPS REVENUE DEBIT

## 2020-06-02 PROCEDURE — 3331090001 HH PPS REVENUE CREDIT

## 2020-06-03 PROCEDURE — 3331090001 HH PPS REVENUE CREDIT

## 2020-06-03 PROCEDURE — 3331090002 HH PPS REVENUE DEBIT

## 2020-06-04 ENCOUNTER — HOME CARE VISIT (OUTPATIENT)
Dept: SCHEDULING | Facility: HOME HEALTH | Age: 75
End: 2020-06-04
Payer: MEDICARE

## 2020-06-04 PROCEDURE — 3331090002 HH PPS REVENUE DEBIT

## 2020-06-04 PROCEDURE — 3331090001 HH PPS REVENUE CREDIT

## 2020-06-05 ENCOUNTER — HOME CARE VISIT (OUTPATIENT)
Dept: HOME HEALTH SERVICES | Facility: HOME HEALTH | Age: 75
End: 2020-06-05
Payer: MEDICARE

## 2020-06-05 ENCOUNTER — HOME CARE VISIT (OUTPATIENT)
Dept: SCHEDULING | Facility: HOME HEALTH | Age: 75
End: 2020-06-05
Payer: MEDICARE

## 2020-06-05 VITALS
TEMPERATURE: 98.3 F | HEART RATE: 63 BPM | DIASTOLIC BLOOD PRESSURE: 84 MMHG | RESPIRATION RATE: 16 BRPM | SYSTOLIC BLOOD PRESSURE: 142 MMHG

## 2020-06-05 PROCEDURE — 3331090001 HH PPS REVENUE CREDIT

## 2020-06-05 PROCEDURE — G0152 HHCP-SERV OF OT,EA 15 MIN: HCPCS

## 2020-06-05 PROCEDURE — 3331090002 HH PPS REVENUE DEBIT

## 2020-06-06 PROCEDURE — 3331090001 HH PPS REVENUE CREDIT

## 2020-06-06 PROCEDURE — 3331090002 HH PPS REVENUE DEBIT

## 2020-06-07 PROCEDURE — 3331090002 HH PPS REVENUE DEBIT

## 2020-06-07 PROCEDURE — 3331090001 HH PPS REVENUE CREDIT

## 2020-06-08 ENCOUNTER — HOME CARE VISIT (OUTPATIENT)
Dept: SCHEDULING | Facility: HOME HEALTH | Age: 75
End: 2020-06-08
Payer: MEDICARE

## 2020-06-08 ENCOUNTER — HOME CARE VISIT (OUTPATIENT)
Dept: HOME HEALTH SERVICES | Facility: HOME HEALTH | Age: 75
End: 2020-06-08
Payer: MEDICARE

## 2020-06-08 VITALS
OXYGEN SATURATION: 96 % | DIASTOLIC BLOOD PRESSURE: 86 MMHG | SYSTOLIC BLOOD PRESSURE: 138 MMHG | RESPIRATION RATE: 18 BRPM | HEART RATE: 65 BPM | TEMPERATURE: 98.7 F

## 2020-06-08 PROCEDURE — 3331090002 HH PPS REVENUE DEBIT

## 2020-06-08 PROCEDURE — G0152 HHCP-SERV OF OT,EA 15 MIN: HCPCS

## 2020-06-08 PROCEDURE — 3331090001 HH PPS REVENUE CREDIT

## 2020-06-09 ENCOUNTER — HOME CARE VISIT (OUTPATIENT)
Dept: HOME HEALTH SERVICES | Facility: HOME HEALTH | Age: 75
End: 2020-06-09
Payer: MEDICARE

## 2020-06-09 ENCOUNTER — HOME CARE VISIT (OUTPATIENT)
Dept: SCHEDULING | Facility: HOME HEALTH | Age: 75
End: 2020-06-09
Payer: MEDICARE

## 2020-06-09 VITALS
OXYGEN SATURATION: 95 % | RESPIRATION RATE: 16 BRPM | SYSTOLIC BLOOD PRESSURE: 130 MMHG | HEART RATE: 61 BPM | TEMPERATURE: 97.9 F | DIASTOLIC BLOOD PRESSURE: 82 MMHG

## 2020-06-09 PROCEDURE — 3331090002 HH PPS REVENUE DEBIT

## 2020-06-09 PROCEDURE — 3331090001 HH PPS REVENUE CREDIT

## 2020-06-09 PROCEDURE — G0152 HHCP-SERV OF OT,EA 15 MIN: HCPCS

## 2020-06-10 ENCOUNTER — HOME CARE VISIT (OUTPATIENT)
Dept: SCHEDULING | Facility: HOME HEALTH | Age: 75
End: 2020-06-10
Payer: MEDICARE

## 2020-06-10 VITALS
HEART RATE: 70 BPM | RESPIRATION RATE: 17 BRPM | DIASTOLIC BLOOD PRESSURE: 81 MMHG | OXYGEN SATURATION: 99 % | TEMPERATURE: 97.9 F | SYSTOLIC BLOOD PRESSURE: 131 MMHG

## 2020-06-10 PROCEDURE — 3331090001 HH PPS REVENUE CREDIT

## 2020-06-10 PROCEDURE — G0300 HHS/HOSPICE OF LPN EA 15 MIN: HCPCS

## 2020-06-10 PROCEDURE — 3331090002 HH PPS REVENUE DEBIT

## 2020-06-11 PROCEDURE — 3331090001 HH PPS REVENUE CREDIT

## 2020-06-11 PROCEDURE — 3331090002 HH PPS REVENUE DEBIT

## 2020-06-12 PROCEDURE — 3331090001 HH PPS REVENUE CREDIT

## 2020-06-12 PROCEDURE — 3331090002 HH PPS REVENUE DEBIT

## 2020-06-13 PROCEDURE — 3331090001 HH PPS REVENUE CREDIT

## 2020-06-13 PROCEDURE — 3331090002 HH PPS REVENUE DEBIT

## 2020-06-14 PROCEDURE — 3331090002 HH PPS REVENUE DEBIT

## 2020-06-14 PROCEDURE — 3331090001 HH PPS REVENUE CREDIT

## 2020-06-15 ENCOUNTER — HOME CARE VISIT (OUTPATIENT)
Dept: SCHEDULING | Facility: HOME HEALTH | Age: 75
End: 2020-06-15
Payer: MEDICARE

## 2020-06-15 ENCOUNTER — HOME CARE VISIT (OUTPATIENT)
Dept: HOME HEALTH SERVICES | Facility: HOME HEALTH | Age: 75
End: 2020-06-15
Payer: MEDICARE

## 2020-06-15 VITALS
RESPIRATION RATE: 16 BRPM | TEMPERATURE: 98.4 F | DIASTOLIC BLOOD PRESSURE: 84 MMHG | HEART RATE: 59 BPM | OXYGEN SATURATION: 99 % | SYSTOLIC BLOOD PRESSURE: 154 MMHG

## 2020-06-15 PROCEDURE — 3331090001 HH PPS REVENUE CREDIT

## 2020-06-15 PROCEDURE — G0152 HHCP-SERV OF OT,EA 15 MIN: HCPCS

## 2020-06-15 PROCEDURE — 3331090002 HH PPS REVENUE DEBIT

## 2020-06-16 PROCEDURE — 3331090001 HH PPS REVENUE CREDIT

## 2020-06-16 PROCEDURE — 3331090002 HH PPS REVENUE DEBIT

## 2020-06-17 ENCOUNTER — HOME CARE VISIT (OUTPATIENT)
Dept: HOME HEALTH SERVICES | Facility: HOME HEALTH | Age: 75
End: 2020-06-17
Payer: MEDICARE

## 2020-06-17 ENCOUNTER — HOME CARE VISIT (OUTPATIENT)
Dept: SCHEDULING | Facility: HOME HEALTH | Age: 75
End: 2020-06-17
Payer: MEDICARE

## 2020-06-17 PROCEDURE — 3331090002 HH PPS REVENUE DEBIT

## 2020-06-17 PROCEDURE — 3331090001 HH PPS REVENUE CREDIT

## 2020-06-17 PROCEDURE — G0299 HHS/HOSPICE OF RN EA 15 MIN: HCPCS

## 2020-06-18 ENCOUNTER — HOME CARE VISIT (OUTPATIENT)
Dept: HOME HEALTH SERVICES | Facility: HOME HEALTH | Age: 75
End: 2020-06-18
Payer: MEDICARE

## 2020-06-18 ENCOUNTER — HOME CARE VISIT (OUTPATIENT)
Dept: SCHEDULING | Facility: HOME HEALTH | Age: 75
End: 2020-06-18
Payer: MEDICARE

## 2020-06-18 PROCEDURE — 3331090001 HH PPS REVENUE CREDIT

## 2020-06-18 PROCEDURE — 3331090002 HH PPS REVENUE DEBIT

## 2020-06-18 PROCEDURE — G0152 HHCP-SERV OF OT,EA 15 MIN: HCPCS

## 2020-06-19 VITALS
DIASTOLIC BLOOD PRESSURE: 84 MMHG | HEART RATE: 61 BPM | SYSTOLIC BLOOD PRESSURE: 168 MMHG | RESPIRATION RATE: 16 BRPM | TEMPERATURE: 98.4 F | OXYGEN SATURATION: 98 %

## 2020-06-19 PROCEDURE — 3331090001 HH PPS REVENUE CREDIT

## 2020-06-19 PROCEDURE — 3331090002 HH PPS REVENUE DEBIT

## 2020-06-20 PROCEDURE — 3331090002 HH PPS REVENUE DEBIT

## 2020-06-20 PROCEDURE — 3331090001 HH PPS REVENUE CREDIT

## 2020-06-21 VITALS
DIASTOLIC BLOOD PRESSURE: 78 MMHG | RESPIRATION RATE: 18 BRPM | HEART RATE: 75 BPM | TEMPERATURE: 98.2 F | OXYGEN SATURATION: 97 % | SYSTOLIC BLOOD PRESSURE: 136 MMHG

## 2020-06-21 PROCEDURE — 3331090001 HH PPS REVENUE CREDIT

## 2020-06-21 PROCEDURE — 3331090002 HH PPS REVENUE DEBIT

## 2020-06-22 PROCEDURE — 3331090002 HH PPS REVENUE DEBIT

## 2020-06-22 PROCEDURE — 3331090001 HH PPS REVENUE CREDIT

## 2020-06-23 PROCEDURE — 3331090002 HH PPS REVENUE DEBIT

## 2020-06-23 PROCEDURE — 3331090001 HH PPS REVENUE CREDIT

## 2020-06-24 PROCEDURE — 3331090001 HH PPS REVENUE CREDIT

## 2020-06-24 PROCEDURE — 3331090002 HH PPS REVENUE DEBIT

## 2020-06-25 ENCOUNTER — HOME CARE VISIT (OUTPATIENT)
Dept: SCHEDULING | Facility: HOME HEALTH | Age: 75
End: 2020-06-25
Payer: MEDICARE

## 2020-06-25 PROCEDURE — 400014 HH F/U

## 2020-06-25 PROCEDURE — 3331090002 HH PPS REVENUE DEBIT

## 2020-06-25 PROCEDURE — G0300 HHS/HOSPICE OF LPN EA 15 MIN: HCPCS

## 2020-06-25 PROCEDURE — 3331090001 HH PPS REVENUE CREDIT

## 2020-06-26 PROCEDURE — 3331090001 HH PPS REVENUE CREDIT

## 2020-06-26 PROCEDURE — 3331090002 HH PPS REVENUE DEBIT

## 2020-06-27 PROCEDURE — 3331090002 HH PPS REVENUE DEBIT

## 2020-06-27 PROCEDURE — 3331090001 HH PPS REVENUE CREDIT

## 2020-06-28 VITALS
RESPIRATION RATE: 16 BRPM | DIASTOLIC BLOOD PRESSURE: 77 MMHG | OXYGEN SATURATION: 98 % | HEART RATE: 70 BPM | TEMPERATURE: 98.2 F | SYSTOLIC BLOOD PRESSURE: 130 MMHG

## 2020-06-28 PROCEDURE — 3331090002 HH PPS REVENUE DEBIT

## 2020-06-28 PROCEDURE — 3331090001 HH PPS REVENUE CREDIT

## 2020-06-29 PROCEDURE — 3331090001 HH PPS REVENUE CREDIT

## 2020-06-29 PROCEDURE — 3331090002 HH PPS REVENUE DEBIT

## 2020-06-30 PROCEDURE — 3331090002 HH PPS REVENUE DEBIT

## 2020-06-30 PROCEDURE — 3331090001 HH PPS REVENUE CREDIT

## 2020-07-01 PROCEDURE — 3331090001 HH PPS REVENUE CREDIT

## 2020-07-01 PROCEDURE — 3331090002 HH PPS REVENUE DEBIT

## 2020-07-02 ENCOUNTER — HOME CARE VISIT (OUTPATIENT)
Dept: HOME HEALTH SERVICES | Facility: HOME HEALTH | Age: 75
End: 2020-07-02
Payer: MEDICARE

## 2020-07-02 PROCEDURE — 3331090002 HH PPS REVENUE DEBIT

## 2020-07-02 PROCEDURE — 3331090001 HH PPS REVENUE CREDIT

## 2020-07-03 PROCEDURE — 3331090002 HH PPS REVENUE DEBIT

## 2020-07-03 PROCEDURE — 3331090001 HH PPS REVENUE CREDIT

## 2020-07-04 PROCEDURE — 3331090002 HH PPS REVENUE DEBIT

## 2020-07-04 PROCEDURE — 3331090001 HH PPS REVENUE CREDIT

## 2020-07-05 PROCEDURE — 3331090002 HH PPS REVENUE DEBIT

## 2020-07-05 PROCEDURE — 3331090001 HH PPS REVENUE CREDIT

## 2020-07-06 PROCEDURE — 3331090002 HH PPS REVENUE DEBIT

## 2020-07-06 PROCEDURE — 3331090001 HH PPS REVENUE CREDIT

## 2020-07-07 PROCEDURE — 3331090001 HH PPS REVENUE CREDIT

## 2020-07-07 PROCEDURE — 3331090002 HH PPS REVENUE DEBIT

## 2020-07-08 ENCOUNTER — HOME CARE VISIT (OUTPATIENT)
Dept: SCHEDULING | Facility: HOME HEALTH | Age: 75
End: 2020-07-08
Payer: MEDICARE

## 2020-07-08 VITALS
TEMPERATURE: 97.9 F | HEART RATE: 73 BPM | DIASTOLIC BLOOD PRESSURE: 71 MMHG | RESPIRATION RATE: 17 BRPM | SYSTOLIC BLOOD PRESSURE: 153 MMHG | OXYGEN SATURATION: 98 %

## 2020-07-08 PROCEDURE — 3331090001 HH PPS REVENUE CREDIT

## 2020-07-08 PROCEDURE — G0300 HHS/HOSPICE OF LPN EA 15 MIN: HCPCS

## 2020-07-08 PROCEDURE — 3331090002 HH PPS REVENUE DEBIT

## 2020-07-09 PROCEDURE — 3331090001 HH PPS REVENUE CREDIT

## 2020-07-09 PROCEDURE — 3331090002 HH PPS REVENUE DEBIT

## 2020-07-10 PROCEDURE — 3331090002 HH PPS REVENUE DEBIT

## 2020-07-10 PROCEDURE — 3331090001 HH PPS REVENUE CREDIT

## 2020-07-11 PROCEDURE — 3331090001 HH PPS REVENUE CREDIT

## 2020-07-11 PROCEDURE — 3331090002 HH PPS REVENUE DEBIT

## 2020-07-12 PROCEDURE — 3331090001 HH PPS REVENUE CREDIT

## 2020-07-12 PROCEDURE — 3331090002 HH PPS REVENUE DEBIT

## 2020-07-13 PROCEDURE — 3331090001 HH PPS REVENUE CREDIT

## 2020-07-13 PROCEDURE — 3331090002 HH PPS REVENUE DEBIT

## 2020-07-14 PROCEDURE — 3331090001 HH PPS REVENUE CREDIT

## 2020-07-14 PROCEDURE — 3331090002 HH PPS REVENUE DEBIT

## 2020-07-15 ENCOUNTER — HOME CARE VISIT (OUTPATIENT)
Dept: SCHEDULING | Facility: HOME HEALTH | Age: 75
End: 2020-07-15
Payer: MEDICARE

## 2020-07-15 PROCEDURE — 3331090001 HH PPS REVENUE CREDIT

## 2020-07-15 PROCEDURE — 3331090002 HH PPS REVENUE DEBIT

## 2020-07-15 PROCEDURE — G0300 HHS/HOSPICE OF LPN EA 15 MIN: HCPCS

## 2020-07-16 PROCEDURE — 3331090002 HH PPS REVENUE DEBIT

## 2020-07-16 PROCEDURE — 3331090001 HH PPS REVENUE CREDIT

## 2020-07-17 PROCEDURE — 3331090002 HH PPS REVENUE DEBIT

## 2020-07-17 PROCEDURE — 3331090001 HH PPS REVENUE CREDIT

## 2020-07-18 PROCEDURE — 3331090002 HH PPS REVENUE DEBIT

## 2020-07-18 PROCEDURE — 3331090001 HH PPS REVENUE CREDIT

## 2020-07-19 VITALS
SYSTOLIC BLOOD PRESSURE: 147 MMHG | RESPIRATION RATE: 16 BRPM | TEMPERATURE: 98 F | OXYGEN SATURATION: 98 % | HEART RATE: 72 BPM | DIASTOLIC BLOOD PRESSURE: 77 MMHG

## 2020-07-19 PROCEDURE — 3331090002 HH PPS REVENUE DEBIT

## 2020-07-19 PROCEDURE — 3331090001 HH PPS REVENUE CREDIT

## 2020-07-20 PROCEDURE — 3331090001 HH PPS REVENUE CREDIT

## 2020-07-20 PROCEDURE — 3331090002 HH PPS REVENUE DEBIT

## 2020-07-21 PROCEDURE — 3331090002 HH PPS REVENUE DEBIT

## 2020-07-21 PROCEDURE — 3331090001 HH PPS REVENUE CREDIT

## 2020-07-22 PROCEDURE — 3331090002 HH PPS REVENUE DEBIT

## 2020-07-22 PROCEDURE — 3331090001 HH PPS REVENUE CREDIT

## 2020-07-23 PROCEDURE — 3331090001 HH PPS REVENUE CREDIT

## 2020-07-23 PROCEDURE — 3331090002 HH PPS REVENUE DEBIT

## 2020-07-24 ENCOUNTER — HOME CARE VISIT (OUTPATIENT)
Dept: SCHEDULING | Facility: HOME HEALTH | Age: 75
End: 2020-07-24
Payer: MEDICARE

## 2020-07-24 PROCEDURE — 400014 HH F/U

## 2020-07-24 PROCEDURE — G0300 HHS/HOSPICE OF LPN EA 15 MIN: HCPCS

## 2020-07-24 PROCEDURE — 3331090002 HH PPS REVENUE DEBIT

## 2020-07-24 PROCEDURE — 3331090001 HH PPS REVENUE CREDIT

## 2020-07-25 PROCEDURE — 3331090002 HH PPS REVENUE DEBIT

## 2020-07-25 PROCEDURE — 3331090001 HH PPS REVENUE CREDIT

## 2020-07-26 PROCEDURE — 3331090002 HH PPS REVENUE DEBIT

## 2020-07-26 PROCEDURE — 3331090001 HH PPS REVENUE CREDIT

## 2020-07-27 VITALS
SYSTOLIC BLOOD PRESSURE: 142 MMHG | TEMPERATURE: 98 F | OXYGEN SATURATION: 98 % | HEART RATE: 70 BPM | DIASTOLIC BLOOD PRESSURE: 72 MMHG | RESPIRATION RATE: 18 BRPM

## 2020-07-27 PROCEDURE — 3331090002 HH PPS REVENUE DEBIT

## 2020-07-27 PROCEDURE — 3331090001 HH PPS REVENUE CREDIT

## 2020-07-28 ENCOUNTER — HOME CARE VISIT (OUTPATIENT)
Dept: SCHEDULING | Facility: HOME HEALTH | Age: 75
End: 2020-07-28
Payer: MEDICARE

## 2020-07-28 PROCEDURE — 3331090002 HH PPS REVENUE DEBIT

## 2020-07-28 PROCEDURE — G0299 HHS/HOSPICE OF RN EA 15 MIN: HCPCS

## 2020-07-28 PROCEDURE — 3331090001 HH PPS REVENUE CREDIT

## 2020-07-29 ENCOUNTER — HOME CARE VISIT (OUTPATIENT)
Dept: HOME HEALTH SERVICES | Facility: HOME HEALTH | Age: 75
End: 2020-07-29
Payer: MEDICARE

## 2020-07-29 ENCOUNTER — HOME CARE VISIT (OUTPATIENT)
Dept: SCHEDULING | Facility: HOME HEALTH | Age: 75
End: 2020-07-29
Payer: MEDICARE

## 2020-07-29 VITALS
HEART RATE: 66 BPM | DIASTOLIC BLOOD PRESSURE: 60 MMHG | SYSTOLIC BLOOD PRESSURE: 122 MMHG | OXYGEN SATURATION: 97 % | RESPIRATION RATE: 18 BRPM

## 2020-07-29 PROCEDURE — G0151 HHCP-SERV OF PT,EA 15 MIN: HCPCS

## 2020-07-29 PROCEDURE — 3331090001 HH PPS REVENUE CREDIT

## 2020-07-29 PROCEDURE — G0152 HHCP-SERV OF OT,EA 15 MIN: HCPCS

## 2020-07-29 PROCEDURE — 3331090002 HH PPS REVENUE DEBIT

## 2020-07-30 PROCEDURE — 3331090001 HH PPS REVENUE CREDIT

## 2020-07-30 PROCEDURE — 3331090002 HH PPS REVENUE DEBIT

## 2020-07-31 ENCOUNTER — HOME CARE VISIT (OUTPATIENT)
Dept: HOME HEALTH SERVICES | Facility: HOME HEALTH | Age: 75
End: 2020-07-31
Payer: MEDICARE

## 2020-07-31 PROCEDURE — 3331090002 HH PPS REVENUE DEBIT

## 2020-07-31 PROCEDURE — 3331090001 HH PPS REVENUE CREDIT

## 2020-08-01 PROCEDURE — 3331090001 HH PPS REVENUE CREDIT

## 2020-08-01 PROCEDURE — 3331090002 HH PPS REVENUE DEBIT

## 2020-08-02 VITALS
TEMPERATURE: 98 F | DIASTOLIC BLOOD PRESSURE: 70 MMHG | OXYGEN SATURATION: 98 % | RESPIRATION RATE: 18 BRPM | SYSTOLIC BLOOD PRESSURE: 130 MMHG | HEART RATE: 80 BPM

## 2020-08-02 PROCEDURE — 3331090001 HH PPS REVENUE CREDIT

## 2020-08-02 PROCEDURE — 3331090002 HH PPS REVENUE DEBIT

## 2020-08-03 ENCOUNTER — HOME CARE VISIT (OUTPATIENT)
Dept: SCHEDULING | Facility: HOME HEALTH | Age: 75
End: 2020-08-03
Payer: MEDICARE

## 2020-08-03 ENCOUNTER — HOME CARE VISIT (OUTPATIENT)
Dept: HOME HEALTH SERVICES | Facility: HOME HEALTH | Age: 75
End: 2020-08-03
Payer: MEDICARE

## 2020-08-03 PROCEDURE — G0152 HHCP-SERV OF OT,EA 15 MIN: HCPCS

## 2020-08-03 PROCEDURE — 3331090002 HH PPS REVENUE DEBIT

## 2020-08-03 PROCEDURE — 3331090001 HH PPS REVENUE CREDIT

## 2020-08-04 VITALS — SYSTOLIC BLOOD PRESSURE: 130 MMHG | TEMPERATURE: 98.2 F | DIASTOLIC BLOOD PRESSURE: 84 MMHG

## 2020-08-04 PROCEDURE — 3331090002 HH PPS REVENUE DEBIT

## 2020-08-04 PROCEDURE — 3331090001 HH PPS REVENUE CREDIT

## 2020-08-05 ENCOUNTER — HOME CARE VISIT (OUTPATIENT)
Dept: HOME HEALTH SERVICES | Facility: HOME HEALTH | Age: 75
End: 2020-08-05
Payer: MEDICARE

## 2020-08-05 PROCEDURE — 3331090001 HH PPS REVENUE CREDIT

## 2020-08-05 PROCEDURE — 3331090002 HH PPS REVENUE DEBIT

## 2020-08-06 ENCOUNTER — HOME CARE VISIT (OUTPATIENT)
Dept: SCHEDULING | Facility: HOME HEALTH | Age: 75
End: 2020-08-06
Payer: MEDICARE

## 2020-08-06 ENCOUNTER — HOME CARE VISIT (OUTPATIENT)
Dept: HOME HEALTH SERVICES | Facility: HOME HEALTH | Age: 75
End: 2020-08-06
Payer: MEDICARE

## 2020-08-06 VITALS
TEMPERATURE: 98.6 F | DIASTOLIC BLOOD PRESSURE: 76 MMHG | HEART RATE: 66 BPM | OXYGEN SATURATION: 99 % | SYSTOLIC BLOOD PRESSURE: 120 MMHG

## 2020-08-06 VITALS
TEMPERATURE: 98.6 F | OXYGEN SATURATION: 99 % | HEART RATE: 66 BPM | SYSTOLIC BLOOD PRESSURE: 120 MMHG | DIASTOLIC BLOOD PRESSURE: 76 MMHG | RESPIRATION RATE: 16 BRPM

## 2020-08-06 PROCEDURE — G0300 HHS/HOSPICE OF LPN EA 15 MIN: HCPCS

## 2020-08-06 PROCEDURE — G0152 HHCP-SERV OF OT,EA 15 MIN: HCPCS

## 2020-08-06 PROCEDURE — G0151 HHCP-SERV OF PT,EA 15 MIN: HCPCS

## 2020-08-06 PROCEDURE — 3331090001 HH PPS REVENUE CREDIT

## 2020-08-06 PROCEDURE — 3331090002 HH PPS REVENUE DEBIT

## 2020-08-07 ENCOUNTER — HOME CARE VISIT (OUTPATIENT)
Dept: HOME HEALTH SERVICES | Facility: HOME HEALTH | Age: 75
End: 2020-08-07
Payer: MEDICARE

## 2020-08-07 PROCEDURE — 3331090002 HH PPS REVENUE DEBIT

## 2020-08-07 PROCEDURE — 3331090001 HH PPS REVENUE CREDIT

## 2020-08-08 PROCEDURE — 3331090001 HH PPS REVENUE CREDIT

## 2020-08-08 PROCEDURE — 3331090002 HH PPS REVENUE DEBIT

## 2020-08-09 PROCEDURE — 3331090001 HH PPS REVENUE CREDIT

## 2020-08-09 PROCEDURE — 3331090002 HH PPS REVENUE DEBIT

## 2020-08-10 ENCOUNTER — HOME CARE VISIT (OUTPATIENT)
Dept: SCHEDULING | Facility: HOME HEALTH | Age: 75
End: 2020-08-10
Payer: MEDICARE

## 2020-08-10 ENCOUNTER — HOME CARE VISIT (OUTPATIENT)
Dept: HOME HEALTH SERVICES | Facility: HOME HEALTH | Age: 75
End: 2020-08-10
Payer: MEDICARE

## 2020-08-10 VITALS
SYSTOLIC BLOOD PRESSURE: 120 MMHG | DIASTOLIC BLOOD PRESSURE: 70 MMHG | HEART RATE: 62 BPM | TEMPERATURE: 98.1 F | OXYGEN SATURATION: 98 % | RESPIRATION RATE: 16 BRPM

## 2020-08-10 VITALS
RESPIRATION RATE: 16 BRPM | SYSTOLIC BLOOD PRESSURE: 120 MMHG | HEART RATE: 62 BPM | DIASTOLIC BLOOD PRESSURE: 72 MMHG | OXYGEN SATURATION: 98 % | TEMPERATURE: 98.1 F

## 2020-08-10 VITALS
TEMPERATURE: 98.2 F | RESPIRATION RATE: 19 BRPM | HEART RATE: 77 BPM | SYSTOLIC BLOOD PRESSURE: 144 MMHG | DIASTOLIC BLOOD PRESSURE: 77 MMHG | OXYGEN SATURATION: 98 %

## 2020-08-10 PROCEDURE — 3331090001 HH PPS REVENUE CREDIT

## 2020-08-10 PROCEDURE — 3331090002 HH PPS REVENUE DEBIT

## 2020-08-10 PROCEDURE — G0152 HHCP-SERV OF OT,EA 15 MIN: HCPCS

## 2020-08-10 PROCEDURE — G0151 HHCP-SERV OF PT,EA 15 MIN: HCPCS

## 2020-08-11 ENCOUNTER — HOME CARE VISIT (OUTPATIENT)
Dept: SCHEDULING | Facility: HOME HEALTH | Age: 75
End: 2020-08-11
Payer: MEDICARE

## 2020-08-11 ENCOUNTER — HOME CARE VISIT (OUTPATIENT)
Dept: HOME HEALTH SERVICES | Facility: HOME HEALTH | Age: 75
End: 2020-08-11
Payer: MEDICARE

## 2020-08-11 PROCEDURE — 3331090002 HH PPS REVENUE DEBIT

## 2020-08-11 PROCEDURE — 3331090001 HH PPS REVENUE CREDIT

## 2020-08-12 ENCOUNTER — HOME CARE VISIT (OUTPATIENT)
Dept: SCHEDULING | Facility: HOME HEALTH | Age: 75
End: 2020-08-12
Payer: MEDICARE

## 2020-08-12 PROCEDURE — 3331090001 HH PPS REVENUE CREDIT

## 2020-08-12 PROCEDURE — 3331090002 HH PPS REVENUE DEBIT

## 2020-08-13 ENCOUNTER — HOME CARE VISIT (OUTPATIENT)
Dept: HOME HEALTH SERVICES | Facility: HOME HEALTH | Age: 75
End: 2020-08-13
Payer: MEDICARE

## 2020-08-13 ENCOUNTER — HOME CARE VISIT (OUTPATIENT)
Dept: SCHEDULING | Facility: HOME HEALTH | Age: 75
End: 2020-08-13
Payer: MEDICARE

## 2020-08-13 VITALS
HEART RATE: 73 BPM | OXYGEN SATURATION: 98 % | TEMPERATURE: 97.7 F | SYSTOLIC BLOOD PRESSURE: 137 MMHG | DIASTOLIC BLOOD PRESSURE: 81 MMHG

## 2020-08-13 VITALS
DIASTOLIC BLOOD PRESSURE: 81 MMHG | SYSTOLIC BLOOD PRESSURE: 137 MMHG | HEART RATE: 74 BPM | TEMPERATURE: 97.7 F | OXYGEN SATURATION: 98 %

## 2020-08-13 PROCEDURE — G0152 HHCP-SERV OF OT,EA 15 MIN: HCPCS

## 2020-08-13 PROCEDURE — 3331090001 HH PPS REVENUE CREDIT

## 2020-08-13 PROCEDURE — G0151 HHCP-SERV OF PT,EA 15 MIN: HCPCS

## 2020-08-13 PROCEDURE — 3331090002 HH PPS REVENUE DEBIT

## 2020-08-14 ENCOUNTER — HOME CARE VISIT (OUTPATIENT)
Dept: SCHEDULING | Facility: HOME HEALTH | Age: 75
End: 2020-08-14
Payer: MEDICARE

## 2020-08-14 PROCEDURE — 3331090002 HH PPS REVENUE DEBIT

## 2020-08-14 PROCEDURE — 3331090001 HH PPS REVENUE CREDIT

## 2020-08-15 PROCEDURE — 3331090002 HH PPS REVENUE DEBIT

## 2020-08-15 PROCEDURE — 3331090001 HH PPS REVENUE CREDIT

## 2020-08-16 PROCEDURE — 3331090001 HH PPS REVENUE CREDIT

## 2020-08-16 PROCEDURE — 3331090002 HH PPS REVENUE DEBIT

## 2020-08-17 ENCOUNTER — HOME CARE VISIT (OUTPATIENT)
Dept: SCHEDULING | Facility: HOME HEALTH | Age: 75
End: 2020-08-17
Payer: MEDICARE

## 2020-08-17 ENCOUNTER — HOME CARE VISIT (OUTPATIENT)
Dept: HOME HEALTH SERVICES | Facility: HOME HEALTH | Age: 75
End: 2020-08-17
Payer: MEDICARE

## 2020-08-17 VITALS
HEART RATE: 58 BPM | TEMPERATURE: 97.9 F | OXYGEN SATURATION: 98 % | SYSTOLIC BLOOD PRESSURE: 144 MMHG | DIASTOLIC BLOOD PRESSURE: 88 MMHG

## 2020-08-17 PROCEDURE — 3331090002 HH PPS REVENUE DEBIT

## 2020-08-17 PROCEDURE — G0151 HHCP-SERV OF PT,EA 15 MIN: HCPCS

## 2020-08-17 PROCEDURE — G0156 HHCP-SVS OF AIDE,EA 15 MIN: HCPCS

## 2020-08-17 PROCEDURE — 3331090001 HH PPS REVENUE CREDIT

## 2020-08-17 PROCEDURE — G0152 HHCP-SERV OF OT,EA 15 MIN: HCPCS

## 2020-08-18 PROCEDURE — 3331090002 HH PPS REVENUE DEBIT

## 2020-08-18 PROCEDURE — 3331090001 HH PPS REVENUE CREDIT

## 2020-08-19 VITALS
SYSTOLIC BLOOD PRESSURE: 144 MMHG | OXYGEN SATURATION: 99 % | TEMPERATURE: 97.9 F | HEART RATE: 53 BPM | DIASTOLIC BLOOD PRESSURE: 88 MMHG

## 2020-08-19 PROCEDURE — 3331090001 HH PPS REVENUE CREDIT

## 2020-08-19 PROCEDURE — 3331090002 HH PPS REVENUE DEBIT

## 2020-08-20 ENCOUNTER — HOME CARE VISIT (OUTPATIENT)
Dept: SCHEDULING | Facility: HOME HEALTH | Age: 75
End: 2020-08-20
Payer: MEDICARE

## 2020-08-20 VITALS
HEART RATE: 76 BPM | OXYGEN SATURATION: 99 % | SYSTOLIC BLOOD PRESSURE: 132 MMHG | TEMPERATURE: 98.6 F | DIASTOLIC BLOOD PRESSURE: 76 MMHG

## 2020-08-20 PROCEDURE — G0151 HHCP-SERV OF PT,EA 15 MIN: HCPCS

## 2020-08-20 PROCEDURE — 3331090003 HH PPS REVENUE ADJ

## 2020-08-20 PROCEDURE — 3331090001 HH PPS REVENUE CREDIT

## 2020-08-20 PROCEDURE — G0152 HHCP-SERV OF OT,EA 15 MIN: HCPCS

## 2020-08-20 PROCEDURE — 3331090002 HH PPS REVENUE DEBIT

## 2020-08-24 ENCOUNTER — HOME CARE VISIT (OUTPATIENT)
Dept: HOME HEALTH SERVICES | Facility: HOME HEALTH | Age: 75
End: 2020-08-24

## 2020-08-26 ENCOUNTER — HOME CARE VISIT (OUTPATIENT)
Dept: HOME HEALTH SERVICES | Facility: HOME HEALTH | Age: 75
End: 2020-08-26

## 2020-09-24 ENCOUNTER — TELEPHONE (OUTPATIENT)
Dept: NEUROLOGY | Age: 75
End: 2020-09-24

## 2020-09-24 NOTE — TELEPHONE ENCOUNTER
----- Message from [employer] sent at 9/24/2020 12:58 PM EDT -----  Regarding: Dr. Kirt Ramesh  General Message/Vendor Calls    Caller's first and last name:Ms. Stewart(daughter)      Reason for call:r/s appt      Callback required yes/no and why:yes      Best contact number(s):[phone]      Details to clarify the request:Pt's daughter canceled appt for today and requested a call back to r/s.       [employer]

## 2020-12-04 ENCOUNTER — OFFICE VISIT (OUTPATIENT)
Dept: FAMILY MEDICINE CLINIC | Age: 75
End: 2020-12-04
Payer: MEDICARE

## 2020-12-04 VITALS
WEIGHT: 270 LBS | BODY MASS INDEX: 37.8 KG/M2 | RESPIRATION RATE: 18 BRPM | DIASTOLIC BLOOD PRESSURE: 96 MMHG | SYSTOLIC BLOOD PRESSURE: 159 MMHG | HEIGHT: 71 IN | HEART RATE: 62 BPM | OXYGEN SATURATION: 99 % | TEMPERATURE: 98.2 F

## 2020-12-04 DIAGNOSIS — K59.03 DRUG-INDUCED CONSTIPATION: ICD-10-CM

## 2020-12-04 DIAGNOSIS — R60.9 PERIPHERAL EDEMA: ICD-10-CM

## 2020-12-04 DIAGNOSIS — I10 ESSENTIAL HYPERTENSION: ICD-10-CM

## 2020-12-04 DIAGNOSIS — M19.90 OSTEOARTHRITIS, UNSPECIFIED OSTEOARTHRITIS TYPE, UNSPECIFIED SITE: Primary | ICD-10-CM

## 2020-12-04 PROBLEM — E66.01 SEVERE OBESITY (HCC): Status: ACTIVE | Noted: 2020-12-04

## 2020-12-04 PROCEDURE — 99203 OFFICE O/P NEW LOW 30 MIN: CPT | Performed by: NURSE PRACTITIONER

## 2020-12-04 RX ORDER — IBUPROFEN 200 MG
200 TABLET ORAL
COMMUNITY

## 2020-12-04 RX ORDER — ALBUTEROL SULFATE 90 UG/1
AEROSOL, METERED RESPIRATORY (INHALATION)
COMMUNITY
Start: 2020-11-03 | End: 2022-04-28 | Stop reason: SDUPTHER

## 2020-12-04 RX ORDER — DOCUSATE SODIUM 100 MG/1
100 CAPSULE, LIQUID FILLED ORAL 2 TIMES DAILY
Qty: 180 CAP | Refills: 3 | Status: SHIPPED | OUTPATIENT
Start: 2020-12-04 | End: 2021-03-04

## 2020-12-04 RX ORDER — BISACODYL 5 MG
10 TABLET, DELAYED RELEASE (ENTERIC COATED) ORAL DAILY
Qty: 90 TAB | Refills: 3 | Status: SHIPPED | OUTPATIENT
Start: 2020-12-04

## 2020-12-04 RX ORDER — LISINOPRIL 10 MG/1
10 TABLET ORAL DAILY
Qty: 90 TAB | Refills: 3 | Status: SHIPPED | OUTPATIENT
Start: 2020-12-04 | End: 2021-04-13 | Stop reason: DRUGHIGH

## 2020-12-04 NOTE — PROGRESS NOTES
Chief Complaint   Patient presents with    Rash     on bialteral legs    Peripheral Edema     bialteral legs    Extremity Weakness     hands    Arm Pain     bilateral arms    Headache    Establish Care     former DR. Sarika Tran at [facility]     Visit Vitals  BP (!) 159/96 (BP 1 Location: Left arm, BP Patient Position: Sitting)   Pulse 62   Temp 98.2 °F (36.8 °C) (Oral)   Resp 18   Ht 5' 11\" (1.803 m)   Wt 270 lb (122.5 kg)   SpO2 99%   BMI 37.66 kg/m²     Subjective  Karen Rowell is a 76 y.o. female. HPI- Pt presented to establish. Accompanied by her daughter. Pt has a complex medical hx and many co-morbidities. She will need to return for another appt in near future to continue assessment due to time constraints today. She has chronic back pain and osteoarthritis of many joints. She has a power chair to get around. Can walk a little ways w/ her walker. The source of chronic pain is back, knees, feet. Sees Dr. Michele Faustin for pain control. Has neuropathy- had back surgery- Dr. Zoe Flowers did the surgery. Her feet are affected. Taking gabapentin that is Rxed by her pain specialist.   Has SOB related to asthma. She never smoked. On Advair once a day. She was in hospital in Oct 2020- her legs were so swollen that she was in hospital for 3 days. Improved but now has a bulging bump on sides of both legs but L side is a little worse. She has had a bedsore on buttocks in past but would refuse the home care nurse according to daughter. Had PT/OT services and RN but all services have stopped. She has been able to manage personal care herself though it is difficult. Chronic constipation due to long term use of opioids. HTN- B/P high in office @ 159/96. Pt might have been a little nervous as was initial appt. She is currently taking amlodipine and though lisinopril is on her med list, she had not been taking and does not have any at home.    Also has chronic insomnia and taking zolpidem- Rxed by her pain specialist. Patient Active Problem List   Diagnosis Code    Severe obesity ([facility] [address] E66.01    Bilateral leg pain M79.604, M79.605    Bilateral leg weakness R29.898    Other chronic pain G89.29    Peripheral neuropathy G62.9    Essential hypertension I10    Osteoarthritis M19.90    Drug-induced constipation K59.03    Insomnia G47.00    Asthma J45.909    Peripheral edema R60.9     Past Medical History:   Diagnosis Date    Arthritis     Asthma     Hypertension      Past Surgical History:   Procedure Laterality Date    HX ORTHOPAEDIC       Current Outpatient Medications   Medication Instructions    albuterol (PROVENTIL HFA, VENTOLIN HFA, PROAIR HFA) 90 mcg/actuation inhaler INHALE 1 PUFF BY MOUTH EVERY 4 HOURS AS NEEDED    amLODIPine (NORVASC) 10 mg, Oral, DAILY    bisacodyL (DULCOLAX) 10 mg, Oral, DAILY    docusate sodium (COLACE) 100 mg, Oral, 2 TIMES DAILY    gabapentin (NEURONTIN) 1,200 mg, Oral, 3 TIMES DAILY    HYDROcodone-acetaminophen (NORCO)  mg tablet 1 Tab, Oral, EVERY 8 HOURS AS NEEDED    ibuprofen (MOTRIN) 200 mg, Oral, EVERY 6 HOURS AS NEEDED    lisinopriL (PRINIVIL, ZESTRIL) 10 mg, Oral, DAILY    zolpidem (AMBIEN) 20 mg, Oral, EVERY BEDTIME     Allergies   Allergen Reactions    Morphine Other (comments)     hallucinations    Morphine Anxiety     Social History     Tobacco Use    Smoking status: Never Smoker    Smokeless tobacco: Never Used   Substance Use Topics    Alcohol use: No    Drug use: No     History reviewed. No pertinent family history. Review of Systems   Constitutional: Negative for chills and fever. HENT: Negative for congestion, ear pain and sore throat. Respiratory: Positive for shortness of breath. Negative for cough and wheezing. Cardiovascular: Negative for chest pain and palpitations. Gastrointestinal: Positive for constipation. Negative for abdominal pain, heartburn, nausea and vomiting. Genitourinary: Negative for dysuria.    Musculoskeletal: Positive for back pain and joint pain. Skin: Negative for rash. Neurological: Positive for sensory change. Negative for dizziness and headaches. Psychiatric/Behavioral: The patient has insomnia. Objective  Physical Exam  Constitutional:       General: She is not in acute distress. Appearance: She is obese. HENT:      Head: Normocephalic. Right Ear: External ear normal.      Left Ear: External ear normal.      Nose: Nose normal.   Eyes:      Conjunctiva/sclera: Conjunctivae normal.   Neck:      Musculoskeletal: Neck supple. Vascular: No carotid bruit. Cardiovascular:      Rate and Rhythm: Normal rate and regular rhythm. Heart sounds: Normal heart sounds. No murmur. Pulmonary:      Effort: Pulmonary effort is normal.      Breath sounds: Normal breath sounds. Musculoskeletal: Normal range of motion. Right lower leg: Edema present. Left lower leg: Edema present. Comments: Assessment limited as pt was in her scooter in exam room and very difficult for her to get out of it. 1+ edema both lower legs but definite bulge seen lateral aspects of both legs. Not tender to palpation, no warmth, skin intact. Skin:     General: Skin is warm and dry. Coloration: Skin is not jaundiced. Findings: No lesion or rash. Neurological:      Mental Status: She is alert and oriented to person, place, and time. Psychiatric:         Mood and Affect: Mood normal.         Behavior: Behavior normal.         Thought Content: Thought content normal.       Assessment & Plan      ICD-10-CM ICD-9-CM    1. Osteoarthritis, unspecified osteoarthritis type, unspecified site  M19.90 715.90 REFERRAL TO RHEUMATOLOGY   2. Essential hypertension  I10 401.9 lisinopriL (PRINIVIL, ZESTRIL) 10 mg tablet   3. Drug-induced constipation  K59.03 564.09 docusate sodium (COLACE) 100 mg capsule     E980.5 bisacodyL (DULCOLAX) 5 mg EC tablet   4. Peripheral edema  R60.9 782.3        1.  Osteoarthritis, unspecified osteoarthritis type, unspecified site  Pt agreeable to referral to rheumatology as she has never seen one. F/U consult notes. - REFERRAL TO RHEUMATOLOGY    2. Essential hypertension  Reordered lisinopril as she had not been taking. Reason unclear. Continue to monitor and may need to adjust medication.     - lisinopriL (PRINIVIL, ZESTRIL) 10 mg tablet; Take 1 Tab by mouth daily. Dispense: 90 Tab; Refill: 3    3. Drug-induced constipation  Pt has had luck w/ this regime in past so will try again. Advised to stay very well hydrated. She has used prunes in past so advised to commence eating them again also. - docusate sodium (COLACE) 100 mg capsule; Take 1 Cap by mouth two (2) times a day for 90 days. Dispense: 180 Cap; Refill: 3  - bisacodyL (DULCOLAX) 5 mg EC tablet; Take 2 Tabs by mouth daily. Dispense: 90 Tab; Refill: 3    4. Peripheral edema  I think pt could benefit from referral to vascular and will address at next encounter as well as order lab work. I have discussed the diagnosis with the patient and the intended plan as seen in the above orders. Pt/caretaker has expressed understanding. Questions were answered concerning future plans. I have discussed medication side effects and warnings as indicated with the patient as well.     Eleanor Andrade NP

## 2020-12-13 PROBLEM — K59.03 DRUG-INDUCED CONSTIPATION: Status: ACTIVE | Noted: 2020-12-13

## 2020-12-13 PROBLEM — R60.9 PERIPHERAL EDEMA: Status: ACTIVE | Noted: 2020-12-13

## 2020-12-13 PROBLEM — R29.898 BILATERAL LEG WEAKNESS: Status: ACTIVE | Noted: 2020-12-13

## 2020-12-13 PROBLEM — J45.909 ASTHMA: Status: ACTIVE | Noted: 2020-12-13

## 2020-12-13 PROBLEM — I10 ESSENTIAL HYPERTENSION: Status: ACTIVE | Noted: 2020-12-13

## 2020-12-13 PROBLEM — M19.90 OSTEOARTHRITIS: Status: ACTIVE | Noted: 2020-12-13

## 2020-12-13 PROBLEM — M79.604 BILATERAL LEG PAIN: Status: ACTIVE | Noted: 2020-12-13

## 2020-12-13 PROBLEM — G47.00 INSOMNIA: Status: ACTIVE | Noted: 2020-12-13

## 2020-12-13 PROBLEM — M79.605 BILATERAL LEG PAIN: Status: ACTIVE | Noted: 2020-12-13

## 2020-12-22 NOTE — PROGRESS NOTES
I  do attest that this note was reviewed and I was available for  NP PAVEL in this day of service and will follow along with NP Arthuro Bumpers.      Chris Green, DO

## 2021-03-26 ENCOUNTER — TRANSCRIBE ORDER (OUTPATIENT)
Dept: SCHEDULING | Age: 76
End: 2021-03-26

## 2021-03-26 DIAGNOSIS — G89.4 CHRONIC PAIN SYNDROME: Primary | ICD-10-CM

## 2021-03-26 DIAGNOSIS — M19.012 PRIMARY OSTEOARTHRITIS OF BOTH SHOULDERS: ICD-10-CM

## 2021-03-26 DIAGNOSIS — G62.9 PERIPHERAL NERVE DISORDER: ICD-10-CM

## 2021-03-26 DIAGNOSIS — G62.9 POLYNEUROPATHY: ICD-10-CM

## 2021-03-26 DIAGNOSIS — M17.0 PRIMARY OSTEOARTHRITIS OF BOTH KNEES: ICD-10-CM

## 2021-03-26 DIAGNOSIS — M19.011 ARTHRITIS OF RIGHT SHOULDER REGION: ICD-10-CM

## 2021-03-26 DIAGNOSIS — M19.011 PRIMARY OSTEOARTHRITIS OF BOTH SHOULDERS: ICD-10-CM

## 2021-03-26 DIAGNOSIS — M47.16 SPONDYLOSIS WITH MYELOPATHY, LUMBAR REGION: ICD-10-CM

## 2021-04-08 ENCOUNTER — OFFICE VISIT (OUTPATIENT)
Dept: NEUROLOGY | Age: 76
End: 2021-04-08
Payer: MEDICARE

## 2021-04-08 VITALS
TEMPERATURE: 97 F | HEIGHT: 71 IN | RESPIRATION RATE: 16 BRPM | BODY MASS INDEX: 37.8 KG/M2 | OXYGEN SATURATION: 99 % | DIASTOLIC BLOOD PRESSURE: 88 MMHG | HEART RATE: 64 BPM | WEIGHT: 270 LBS | SYSTOLIC BLOOD PRESSURE: 120 MMHG

## 2021-04-08 DIAGNOSIS — G62.9 POLYNEUROPATHY: Primary | ICD-10-CM

## 2021-04-08 DIAGNOSIS — M79.7 FIBROMYALGIA: ICD-10-CM

## 2021-04-08 DIAGNOSIS — E56.9 HYPOVITAMINOSIS: ICD-10-CM

## 2021-04-08 DIAGNOSIS — M79.18 MYOFASCIAL MUSCLE PAIN: ICD-10-CM

## 2021-04-08 DIAGNOSIS — R26.9 GAIT DISORDER: ICD-10-CM

## 2021-04-08 DIAGNOSIS — G89.4 CHRONIC PAIN SYNDROME: ICD-10-CM

## 2021-04-08 DIAGNOSIS — G60.8 HEREDITARY SENSORY/AUTONOMIC NEUROPATHY (HSN/HSAN): ICD-10-CM

## 2021-04-08 PROCEDURE — G8432 DEP SCR NOT DOC, RNG: HCPCS | Performed by: PSYCHIATRY & NEUROLOGY

## 2021-04-08 PROCEDURE — G8752 SYS BP LESS 140: HCPCS | Performed by: PSYCHIATRY & NEUROLOGY

## 2021-04-08 PROCEDURE — 3288F FALL RISK ASSESSMENT DOCD: CPT | Performed by: PSYCHIATRY & NEUROLOGY

## 2021-04-08 PROCEDURE — G8536 NO DOC ELDER MAL SCRN: HCPCS | Performed by: PSYCHIATRY & NEUROLOGY

## 2021-04-08 PROCEDURE — G8400 PT W/DXA NO RESULTS DOC: HCPCS | Performed by: PSYCHIATRY & NEUROLOGY

## 2021-04-08 PROCEDURE — G8417 CALC BMI ABV UP PARAM F/U: HCPCS | Performed by: PSYCHIATRY & NEUROLOGY

## 2021-04-08 PROCEDURE — 1090F PRES/ABSN URINE INCON ASSESS: CPT | Performed by: PSYCHIATRY & NEUROLOGY

## 2021-04-08 PROCEDURE — G8754 DIAS BP LESS 90: HCPCS | Performed by: PSYCHIATRY & NEUROLOGY

## 2021-04-08 PROCEDURE — 1100F PTFALLS ASSESS-DOCD GE2>/YR: CPT | Performed by: PSYCHIATRY & NEUROLOGY

## 2021-04-08 PROCEDURE — G8427 DOCREV CUR MEDS BY ELIG CLIN: HCPCS | Performed by: PSYCHIATRY & NEUROLOGY

## 2021-04-08 PROCEDURE — 99205 OFFICE O/P NEW HI 60 MIN: CPT | Performed by: PSYCHIATRY & NEUROLOGY

## 2021-04-08 RX ORDER — LANOLIN ALCOHOL/MO/W.PET/CERES
50 CREAM (GRAM) TOPICAL DAILY
Qty: 30 TAB | Refills: 2 | Status: SHIPPED | OUTPATIENT
Start: 2021-04-08 | End: 2021-04-18

## 2021-04-08 RX ORDER — ZOLPIDEM TARTRATE 10 MG/1
20 TABLET ORAL
OUTPATIENT
Start: 2021-04-08

## 2021-04-08 RX ORDER — FOLIC ACID 1 MG/1
1 TABLET ORAL DAILY
Qty: 30 TAB | Refills: 2 | Status: SHIPPED | OUTPATIENT
Start: 2021-04-08 | End: 2021-04-18

## 2021-04-08 RX ORDER — PREGABALIN 75 MG/1
75 CAPSULE ORAL 2 TIMES DAILY
Qty: 60 CAP | Refills: 1 | Status: SHIPPED | OUTPATIENT
Start: 2021-04-08 | End: 2021-08-10 | Stop reason: ALTCHOICE

## 2021-04-08 RX ORDER — AMITRIPTYLINE HYDROCHLORIDE 10 MG/1
10 TABLET, FILM COATED ORAL
Qty: 30 TAB | Refills: 2 | Status: SHIPPED | OUTPATIENT
Start: 2021-04-08 | End: 2021-04-30 | Stop reason: SDUPTHER

## 2021-04-08 NOTE — PROGRESS NOTES
Chief Complaint   Patient presents with    Establish Care     neuropathy       PT order , demographs and insurance info faxed  to [facility] physical therapy at [phone]

## 2021-04-09 NOTE — PROGRESS NOTES
Neurology Consult Note      HISTORY PROVIDED BY: patient/daughter    Chief Complaint:   Chief Complaint   Patient presents with    [facility]     neuropathy      Subjective:    Pia Cox is a 68 y.o. right handed female who presents in consultation for neuropathy. Patient was accompanied by her daughter to the visit. This is a 59-year-old right-handed obese black female with history of hypertension, peripheral neuropathy, DJD, asthma, chronic pain syndrome, insomnia lymphedema, who was referred to the clinic for evaluation and continued management of neuropathy. According to patient, she has been diagnosed with neuropathy i for more than 30 years, started with burning sensation, numbness and tingling sensation, progressed. She says she has had negative testing which comfirmed the neuropathy. She noted that she had been in a lot of pain, apparently being followed by pain specialist.  Patient noted that with the neuropathy, she also has been arthritis of all the joints, so much so that she became weak in the legs but is unable to ambulate freely. Patient currently is in motorized wheelchair. She appears to get around at home with walker. Patient noted she has been gabapentin for a long time but it is not helping the neuropathy and the pain. She noted that she cannot raise her arm above her head. Patient says with time she started having  pain all over. On further questioning, daughter who was with her says she also has peripheral neuropathy as well as her brother, it is most likely that this is a hereditary sensory neuropathy. Patient is also says she cannot sleep, going to sleep and staying asleep, has chronic insomnia, I am not sure if this patient has been to sleep medicine. She is however on chronic sleep aid Ambien. Patient had back surgery for some reason, has also developed leg swelling which eventually ended up with lymphedema. She denies incontinence.   She denies loss of consciousness, dysphagia or odynophagia. Review of Systems - General ROS: positive for  - fatigue, night sweats, sleep disturbance and weight gain  Psychological ROS: positive for - anxiety and sleep disturbances  Ophthalmic ROS: positive for - blurry vision and decreased vision  ENT ROS: positive for - headaches, tinnitus and visual changes  Allergy and Immunology ROS: negative  Hematological and Lymphatic ROS: negative  Breast ROS: negative for breast lumps  Respiratory ROS: no cough, shortness of breath, or wheezing  Cardiovascular ROS: no chest pain or dyspnea on exertion  Gastrointestinal ROS: no abdominal pain, change in bowel habits, or black or bloody stools  Genito-Urinary ROS: no dysuria, trouble voiding, or hematuria  Musculoskeletal ROS: positive for - gait disturbance, joint pain, joint stiffness, joint swelling, muscle pain and muscular weakness  Neurological ROS: positive for - gait disturbance, headaches, impaired coordination/balance, numbness/tingling, visual changes and weakness  Dermatological ROS: negative  As patient has been on Neurontin at the maximum dose for many years and it is not helping, I will switch patient to Lyrica, starting on a low-dose and gradually escalate the dose.   Past Medical History:   Diagnosis Date    Arthritis     Asthma     Hypertension       Past Surgical History:   Procedure Laterality Date    HX ORTHOPAEDIC        Social History     Socioeconomic History    Marital status: SINGLE     Spouse name: Not on file    Number of children: Not on file    Years of education: Not on file    Highest education level: Not on file   Occupational History    Not on file   Social Needs    Financial resource strain: Not on file    Food insecurity     Worry: Not on file     Inability: Not on file    Transportation needs     Medical: Not on file     Non-medical: Not on file   Tobacco Use    Smoking status: Never Smoker    Smokeless tobacco: Never Used   Substance and Sexual Activity    Alcohol use: No    Drug use: No    Sexual activity: Never     Birth control/protection: None   Lifestyle    Physical activity     Days per week: Not on file     Minutes per session: Not on file    Stress: Not on file   Relationships    Social connections     Talks on phone: Not on file     Gets together: Not on file     Attends Quaker service: Not on file     Active member of club or organization: Not on file     Attends meetings of clubs or organizations: Not on file     Relationship status: Not on file    Intimate partner violence     Fear of current or ex partner: Not on file     Emotionally abused: Not on file     Physically abused: Not on file     Forced sexual activity: Not on file   Other Topics Concern    Not on file   Social History Narrative    Not on file     Family History   Problem Relation Age of Onset    Arthritis Mother     Heart defect Father     Diabetes Sister          Objective:   ROS  As per HPI    Allergies   Allergen Reactions    Morphine Other (comments)     hallucinations    Morphine Anxiety        Meds:  Outpatient Medications Prior to Visit   Medication Sig Dispense Refill    albuterol (PROVENTIL HFA, VENTOLIN HFA, PROAIR HFA) 90 mcg/actuation inhaler INHALE 1 PUFF BY MOUTH EVERY 4 HOURS AS NEEDED      ibuprofen (MOTRIN) 200 mg tablet Take 200 mg by mouth every six (6) hours as needed.  bisacodyL (DULCOLAX) 5 mg EC tablet Take 2 Tabs by mouth daily. 90 Tab 3    zolpidem (Ambien) 10 mg tablet Take 20 mg by mouth nightly.  amLODIPine (NORVASC) 10 mg tablet Take 10 mg by mouth daily.  HYDROcodone-acetaminophen (NORCO)  mg tablet Take 1 Tab by mouth every eight (8) hours as needed for Pain.  lisinopriL (PRINIVIL, ZESTRIL) 10 mg tablet Take 1 Tab by mouth daily. 90 Tab 3    gabapentin (NEURONTIN) 600 mg tablet Take 1,200 mg by mouth three (3) times daily. No facility-administered medications prior to visit.         Imaging:  MRI Results (most recent):  Results from [place] encounter on 07/02/13   MRI SHOULDER LT WO CONT    Narrative **Final Report**       ICD Codes / Adm. Diagnosis: 726.2  719.41 / Other affections of shoulder r    Pain in joint, shoulder shameka  Examination:  MR SHOULDER WO CON LT  - [id number] - Jul 2 2013  3:04PM  Accession No:  [id number]  Reason:  rct      REPORT:  EXAM:  MR SHOULDER WO CON LT    INDICATION: Severe osteoarthritis. COMPARISON: None    TECHNIQUE: Axial proton density fat-saturated; oblique coronal T1, T2   fat-saturated, and proton density fat-saturated; and oblique sagittal T2   fat-saturated MRI of the left shoulder. CONTRAST: None. FINDINGS: A.C. joint: Moderate osteoarthritis. Superior osteophytes. Anterior acromion process type: 2    Glenohumeral articular cartilage: Severe osteoarthritis. Bulky marginal   osteophytes. Extensive subchondral cyst formation. Flattening and volume   loss of the glenoid. Glenoid measures approximately 2.8 cm AP by 1.4 cm   transverse by 2.3 cm craniocaudal.    Bone marrow: No acute fracture or dislocation. Joint fluid: Small glenohumeral joint effusion contains debris and mild   synovitis. Largest loose body measures 4 mm in the posterior, inferior   glenohumeral joint space. Rotator cuff tendons: Irregular partial-thickness tears of the distal,   anterior supraspinatus tendon. Infraspinatus and teres minor tendons are   intact. Partial-thickness and intrasubstance tears of the distal   subscapularis tendon. Biceps tendon: Mild intra-articular biceps tendinosis. No full-thickness   tear or retraction. Muscles: Mild diffuse muscle atrophy. No focal atrophy or edema. Glenoid labrum: Degenerated. Joint capsule: within normal limits. Soft tissue mass: None. IMPRESSION:  1. Severe glenohumeral joint osteoarthritis. 2. Partial-thickness rotator cuff tears. No full-thickness tear or   retraction. 3. Mild diffuse muscle atrophy. Signing/Reading Doctor: Genet Zapien ([id number])    Approved: Ether Dura ([id number])  Jul 2 2013  3:55PM                                     CT Results (most recent):  Results from Hospital Encounter encounter on 01/23/19   CT LOW EXT LT WO CONT    Narrative EXAM: CT LOW EXT LT WO CONT    INDICATION: Severe left knee pain. Left knee replacement on unspecified date. COMPARISON: [The views on 12/27/2016. TECHNIQUE: Helical CT of the left knee with three-dimensional, coronal and  sagittal reformats. Images reviewed in soft tissue and bone windows. CT dose  reduction was achieved through the use of a standardized protocol tailored for  this examination and automatic exposure control for dose modulation. CONTRAST: None. FINDINGS: Hardware: Metal artifact arises from the femoral and tibial components  of the knee prosthesis. Patellar component is radiolucent and difficult to  evaluate. No lucency around the components. Tibial component is partially  secured with cement. Bones: Mild osteopenia. No acute fracture. No suspicious bone lesion. Joint fluid: Small suprapatellar joint effusion. No evidence of radiolucent  loose body. Articulations: Tricompartment prosthesis. Tendons: Extensor mechanism is intact within the limitation of metal artifact. Muscles: Mild atrophy. Soft tissue mass: None. Stranding in the subcutaneous fat lateral to the distal  femur may represent soft tissue contusion. Impression IMPRESSION:   1. Left knee prosthesis is in good position without evidence of fracture or  complication. 2. Nonspecific joint effusion. 3. Fat stranding lateral to the distal femur may represent soft tissue contusion  in the proper clinical setting. No drainable fluid collection.         Reviewed records in [email or web address] and [facility] tab today    Lab Review   Results for orders placed or performed during the hospital encounter of 06/20/19   URINE CULTURE HOLD SAMPLE    Specimen: Serum   Result Value Ref Range    Urine culture hold        URINE ON HOLD IN MICROBIOLOGY DEPT FOR 3 DAYS. IF UNPRESERVED URINE IS SUBMITTED, IT CANNOT BE USED FOR ADDITIONAL TESTING AFTER 24 HRS, RECOLLECTION WILL BE REQUIRED.    CULTURE, URINE    Specimen: Clean catch; Urine   Result Value Ref Range    Special Requests: NO SPECIAL REQUESTS      [place] Count >100,000  COLONIES/mL        Culture result: ESCHERICHIA COLI (A)      Culture result: MIXED UROGENITAL CLEMENTINE ISOLATED         Susceptibility    Escherichia coli - RAINA     Amikacin ($) <=16 Susceptible ug/mL     Ampicillin ($) >16 Resistant ug/mL     Ampicillin/sulbactam ($) >16/8 Resistant ug/mL     Aztreonam ($$$$) <=4 Susceptible ug/mL     Cefazolin ($) <=8 Susceptible ug/mL     Cefepime ($$) <=4 Susceptible ug/mL     Cefotaxime <=2 Susceptible ug/mL     Ceftazidime ($) <=1 Susceptible ug/mL     Ceftriaxone ($) <=1 Susceptible ug/mL     Cefuroxime ($) <=4 Susceptible ug/mL     Ciprofloxacin ($) <=1 Susceptible ug/mL     Gentamicin ($) <=4 Susceptible ug/mL     Imipenem <=1 Susceptible ug/mL     Levofloxacin ($) <=2 Susceptible ug/mL     Meropenem ($$) <=1 Susceptible ug/mL     Nitrofurantoin <=32 Susceptible ug/mL     Piperacillin/Tazobac ($) <=16 Susceptible ug/mL     Tobramycin ($) <=4 Susceptible ug/mL     Trimeth/Sulfa <=2/38 Susceptible ug/mL   URINALYSIS W/ RFLX MICROSCOPIC   Result Value Ref Range    Color YELLOW/STRAW      Appearance CLOUDY (A) CLEAR      Specific gravity 1.017 1.003 - 1.030      pH (UA) 6.5 5.0 - 8.0      Protein NEGATIVE  NEG mg/dL    Glucose NEGATIVE  NEG mg/dL    Ketone NEGATIVE  NEG mg/dL    Bilirubin NEGATIVE  NEG      Blood NEGATIVE  NEG      Urobilinogen 1.0 0.2 - 1.0 EU/dL    Nitrites POSITIVE (A) NEG      Leukocyte Esterase SMALL (A) NEG      WBC 5-10 0 - 4 /hpf    RBC 0-5 0 - 5 /hpf    Epithelial cells FEW FEW /lpf    Bacteria 3+ (A) NEG /hpf        Exam:  Visit Vitals  /88   Pulse 64   Temp 97 °F (36.1 °C) (Temporal)   Resp 16   Ht 5' 11\" (1.803 m)   Wt 270 lb (122.5 kg)   SpO2 99%   BMI 37.66 kg/m²     General:  Alert, cooperative, no distress. Head:  Normocephalic, without obvious abnormality, atraumatic. Respiratory:  Heart:   Non labored breathing  Regular rate and rhythm, no murmurs   Neck:   2+ carotids, no bruits   Extremities:  Cold, no cyanosis, lymphedema. Decreased range of motion all joints   Pulses: 2+ radial pulses. Neurologic:  MS: Alert and oriented x 4, speech intact. Language intact, able to name, repeat, and follow all commands. Attention and fund of knowledge appropriate. Recent and remote memory intact. Cranial Nerves:  II: visual fields Full to confrontation   II: pupils Equal, round, reactive to light   II: optic disc No papilledema   III,VII: ptosis none   III,IV,VI: extraocular muscles  EOMI, no nystagmus or diplopia   V: facial light touch sensation  normal   VII: facial muscle function   symmetric   VIII: hearing intact   IX: soft palate elevation  normal   XI: trapezius strength  5/5   XI: sternocleidomastoid strength 5/5   XII: tongue  Midline     Motor: normal bulk and tone, no tremor              Strength: 4-/5 bilateral lower extremity, 4/5 bilateral upper extremity , mild no PD  Sensory: Decreased sensation to LT, PP, temperature and vibration in all extremities  Coordination: FTN and HTS abnormal, SUZI i abnormal,Romberg not assessed  Gait: Abnormal l gait, wheelchair-bound   Reflexes: 1+ symmetric. Plantar: Mute           Assessment/Plan       ICD-10-CM ICD-9-CM    1. Polyneuropathy  G62.9 356.9 pregabalin (LYRICA) 75 mg capsule   2. Hereditary sensory/autonomic neuropathy (HSN/HSAN)  G60.8 356.2 pregabalin (LYRICA) 75 mg capsule     356.0 PROTEIN ELECTROPHORESIS      PROTEIN ELECTROPHORESIS   3. Gait disorder  R26.9 781.2 ALDOLASE      PROTEIN ELECTROPHORESIS      CK      REFERRAL TO PHYSICAL THERAPY      ALDOLASE      PROTEIN ELECTROPHORESIS      CK   4.  Myofascial muscle pain  M79.18 729.1 ALDOLASE      PROTEIN ELECTROPHORESIS      CK      REFERRAL TO PHYSICAL THERAPY      ALDOLASE      PROTEIN ELECTROPHORESIS      CK   5. Hypovitaminosis  E56.9 269.2 VITAMIN B12      VITAMIN B12   6. Fibromyalgia  M79.7 729.1 pregabalin (LYRICA) 75 mg capsule   7. Chronic pain syndrome  G89.4 338.4    Plan:  Lyrica 75 mg p.o. twice daily  Amitriptyline 10 mg p.o. nightly  Vitamin B6 50 mg p.o. daily  Folic acid 1 mg p.o. daily  Vitamin B12 1000 mcg p.o. daily  Referred to physical and Occupational Therapy  Blood for protein serum electrophoresis, CK, aldolase, vitamin B12, vitamin D, ESR. Follow-up and Dispositions    · Return in about 2 months (around 6/8/2021). Thank you very much for this consultation.          Signed:  Jay Resendez MD  4/8/2021

## 2021-04-12 LAB
ALBUMIN SERPL ELPH-MCNC: 3.9 G/DL (ref 2.9–4.4)
ALBUMIN/GLOB SERPL: 1.4 {RATIO} (ref 0.7–1.7)
ALDOLASE SERPL-CCNC: 4.7 U/L (ref 3.3–10.3)
ALPHA1 GLOB SERPL ELPH-MCNC: 0.2 G/DL (ref 0–0.4)
ALPHA2 GLOB SERPL ELPH-MCNC: 0.5 G/DL (ref 0.4–1)
B-GLOBULIN SERPL ELPH-MCNC: 1 G/DL (ref 0.7–1.3)
CK SERPL-CCNC: 82 U/L (ref 32–182)
GAMMA GLOB SERPL ELPH-MCNC: 1 G/DL (ref 0.4–1.8)
GLOBULIN SER CALC-MCNC: 2.8 G/DL (ref 2.2–3.9)
M PROTEIN SERPL ELPH-MCNC: NORMAL G/DL
PLEASE NOTE, 011150: NORMAL
PROT SERPL-MCNC: 6.7 G/DL (ref 6–8.5)
VIT B12 SERPL-MCNC: >2000 PG/ML (ref 232–1245)

## 2021-04-13 ENCOUNTER — OFFICE VISIT (OUTPATIENT)
Dept: FAMILY MEDICINE CLINIC | Age: 76
End: 2021-04-13

## 2021-04-13 VITALS
HEIGHT: 71 IN | DIASTOLIC BLOOD PRESSURE: 72 MMHG | HEART RATE: 74 BPM | BODY MASS INDEX: 37.8 KG/M2 | SYSTOLIC BLOOD PRESSURE: 155 MMHG | OXYGEN SATURATION: 98 % | WEIGHT: 270 LBS | TEMPERATURE: 98 F | RESPIRATION RATE: 18 BRPM

## 2021-04-13 DIAGNOSIS — R60.9 PERIPHERAL EDEMA: ICD-10-CM

## 2021-04-13 DIAGNOSIS — E66.01 SEVERE OBESITY (HCC): ICD-10-CM

## 2021-04-13 DIAGNOSIS — M12.9 ARTHRITIS/ARTHROPATHY OF MULTIPLE JOINTS: ICD-10-CM

## 2021-04-13 DIAGNOSIS — I10 ESSENTIAL HYPERTENSION: Primary | ICD-10-CM

## 2021-04-13 PROCEDURE — G8417 CALC BMI ABV UP PARAM F/U: HCPCS | Performed by: NURSE PRACTITIONER

## 2021-04-13 PROCEDURE — G8427 DOCREV CUR MEDS BY ELIG CLIN: HCPCS | Performed by: NURSE PRACTITIONER

## 2021-04-13 PROCEDURE — G8754 DIAS BP LESS 90: HCPCS | Performed by: NURSE PRACTITIONER

## 2021-04-13 PROCEDURE — G8432 DEP SCR NOT DOC, RNG: HCPCS | Performed by: NURSE PRACTITIONER

## 2021-04-13 PROCEDURE — G8536 NO DOC ELDER MAL SCRN: HCPCS | Performed by: NURSE PRACTITIONER

## 2021-04-13 PROCEDURE — G8753 SYS BP > OR = 140: HCPCS | Performed by: NURSE PRACTITIONER

## 2021-04-13 PROCEDURE — 3288F FALL RISK ASSESSMENT DOCD: CPT | Performed by: NURSE PRACTITIONER

## 2021-04-13 PROCEDURE — 99214 OFFICE O/P EST MOD 30 MIN: CPT | Performed by: NURSE PRACTITIONER

## 2021-04-13 PROCEDURE — 1090F PRES/ABSN URINE INCON ASSESS: CPT | Performed by: NURSE PRACTITIONER

## 2021-04-13 PROCEDURE — G8400 PT W/DXA NO RESULTS DOC: HCPCS | Performed by: NURSE PRACTITIONER

## 2021-04-13 PROCEDURE — 1100F PTFALLS ASSESS-DOCD GE2>/YR: CPT | Performed by: NURSE PRACTITIONER

## 2021-04-13 RX ORDER — DOCUSATE SODIUM 100 MG/1
CAPSULE, LIQUID FILLED ORAL
COMMUNITY
Start: 2021-03-18 | End: 2022-07-20 | Stop reason: SDUPTHER

## 2021-04-13 RX ORDER — LISINOPRIL 20 MG/1
20 TABLET ORAL DAILY
Qty: 90 TAB | Refills: 1 | Status: SHIPPED | OUTPATIENT
Start: 2021-04-13 | End: 2021-10-03

## 2021-04-13 NOTE — PROGRESS NOTES
Visit Vitals  BP (!) 155/72 (BP 1 Location: Right arm, BP Patient Position: Sitting, BP Cuff Size: Adult)   Pulse 74   Temp 98 °F (36.7 °C) (Temporal)   Resp 18   Ht 5' 11\" (1.803 m)   Wt 270 lb (122.5 kg)   SpO2 98%   BMI 37.66 kg/m²     Chief Complaint   Patient presents with    Follow-up     face swollen and area on leg    Medication Refill     Per daughter, blood pressure was elevated at the dentist office.

## 2021-04-13 NOTE — PROGRESS NOTES
Chief Complaint   Patient presents with    Follow-up     face swollen and area on leg    Medication Refill     Visit Vitals  BP (!) 155/72 (BP 1 Location: Right arm, BP Patient Position: Sitting, BP Cuff Size: Adult)   Pulse 74   Temp 98 °F (36.7 °C) (Temporal)   Resp 18   Ht 5' 11\" (1.803 m)   Wt 270 lb (122.5 kg)   SpO2 98%   BMI 37.66 kg/m²     Rolf Frazier is a 68 y.o. female. HPI:  Pt presented for f/u. Accompanied by her daughter. Pt is ambulatory for short distances but gets around mainly w/ use of scooter. Pt has many medical co-morbidities. She has been under the care of Dr. Michelle Balderas, rehab specialist, for many years at [facility] for dxes of chronic pain syndrome, osteoarthritis of both knees, both shoulders, and polyneuropathy. Pt is quite upset because [facility] will no longer have nurses or medical providers associated w/ them as employees. Pt has done water PT in past for years at [address] [place] [address] but stopped because of Covid 19 crisis. She feels not exercising has set her back. She has gained weight also. She would like to start the pool PT again. She said that Dr. Linda Singh would do the new referral.   Pt stated that her legs, feet, shoulders, arms and hands sting. Recently established w/ neurologist  on 4/8/2021 for assessment of neuropathy pain. Review of office note indicated that daughter and son of rose also have peripheral neuropathy so provider thinking may have hereditary sensory neuropathy. She was on max dose of gabapentin for years and not effective so med was changed by neurologist to lyrica and he will gradually increase dose as needed for symptom control. She is also on chronic pain management and her opioid medication has been ordered by Dr. Linda Singh. Pt stated that he is trying to find her a new pain provider before he has to leave [facility]. Apparently he will be hospital based in future and not seeing pts at a new practice.   Pt currently living alone w/ much support from her daughter. She can't get into tub. She does sponge baths. Pt wondering if she can get a walk in tub paid for by . Advised her to spoke w/ [facility] personnel for guidance. Pt concerned about her face being swollen and she has a place on her L leg that looks different than rest of leg. No pain at site. B/P elevated in office @ 155/72. Taking lisinopril and amlodipine for B/P control currently. Daughter checks B/P at pt's home- usually lower when checked at home. Pt stated that she has palpitations nightly. She also has chronically swollen legs and feet.      Patient Active Problem List   Diagnosis Code    Severe obesity ([facility] [address] E66.01    Bilateral leg pain M79.604, M79.605    Bilateral leg weakness R29.898    Other chronic pain G89.29    Peripheral neuropathy G62.9    Essential hypertension I10    Osteoarthritis M19.90    Drug-induced constipation K59.03    Insomnia G47.00    Asthma J45.909    Peripheral edema R60.9    Partial tear of left rotator cuff M75.112    Partial tear of right rotator cuff M75.111    Disuse muscle atrophy M62.50    Arthritis/arthropathy of multiple joints M12.9     Past Medical History:   Diagnosis Date    Asthma      Past Surgical History:   Procedure Laterality Date    HX ORTHOPAEDIC       Current Outpatient Medications   Medication Instructions    albuterol (PROVENTIL HFA, VENTOLIN HFA, PROAIR HFA) 90 mcg/actuation inhaler INHALE 1 PUFF BY MOUTH EVERY 4 HOURS AS NEEDED    amitriptyline (ELAVIL) 10 mg, Oral, EVERY BEDTIME    amLODIPine (NORVASC) 10 mg, Oral, DAILY    bisacodyL (DULCOLAX) 10 mg, Oral, DAILY    docusate sodium (COLACE) 100 mg capsule TAKE 1 CAPSULE BY MOUTH TWICE A DAY FOR 90 DAYS    folic acid (FOLVITE) 1 mg tablet TAKE 1 TABLET BY MOUTH EVERY DAY    HYDROcodone-acetaminophen (NORCO)  mg tablet 1 Tab, Oral, EVERY 8 HOURS AS NEEDED    ibuprofen (MOTRIN) 200 mg, Oral, EVERY 6 HOURS AS NEEDED    lisinopriL (PRINIVIL, ZESTRIL) 20 mg, Oral, DAILY    pregabalin (LYRICA) 75 mg, Oral, 2 TIMES DAILY    pyridoxine, vitamin B6, (VITAMIN B-6) 50 mg tablet TAKE 1 TABLET BY MOUTH EVERY DAY    zolpidem (AMBIEN) 20 mg, Oral, EVERY BEDTIME     Allergies   Allergen Reactions    Morphine Other (comments)     hallucinations    Morphine Anxiety     Social History     Tobacco Use    Smoking status: Never Smoker    Smokeless tobacco: Never Used   Substance Use Topics    Alcohol use: No    Drug use: No     Family History   Problem Relation Age of Onset    Arthritis Mother     Heart defect Father     Diabetes Sister        Review of Systems   Constitutional: Negative for chills and fever. HENT: Negative for sore throat. Respiratory: Negative for cough, shortness of breath and wheezing. Cardiovascular: Positive for palpitations and leg swelling. Negative for chest pain. Palpitations     Gastrointestinal: Positive for abdominal pain and nausea. Negative for heartburn and vomiting. Stomach hurts and peanut butter helps. Pain across the center. Genitourinary: Negative for dysuria. Musculoskeletal: Positive for back pain, joint pain and myalgias. Negative for falls. Neurological: Positive for tingling, sensory change, weakness and headaches. Negative for dizziness. Headaches daily   Psychiatric/Behavioral: Positive for depression. The patient has insomnia. Objective  Physical Exam  Constitutional:       General: She is not in acute distress. Appearance: Normal appearance. HENT:      Head: Normocephalic. Comments: Face appeared mildly puffy bilaterally. Right Ear: External ear normal.      Left Ear: External ear normal.      Nose: Nose normal.   Eyes:      Conjunctiva/sclera: Conjunctivae normal.   Neck:      Musculoskeletal: Neck supple. Vascular: No carotid bruit. Cardiovascular:      Rate and Rhythm: Normal rate and regular rhythm. Heart sounds: Normal heart sounds. No murmur. Pulmonary:      Effort: Pulmonary effort is normal. No respiratory distress. Breath sounds: Normal breath sounds. Musculoskeletal:         General: Deformity present. No swelling or tenderness. Right lower leg: Edema present. Left lower leg: Edema present. Comments: R leg, back of calf, has area where muscle is not as prominent as rest of calf. Not painful, no erythema or warmth. Skin:     General: Skin is warm and dry. Coloration: Skin is not jaundiced. Findings: No rash. Neurological:      Mental Status: She is alert and oriented to person, place, and time. Sensory: Sensory deficit present. Motor: Weakness present. Psychiatric:         Mood and Affect: Mood normal.         Behavior: Behavior normal.         Thought Content: Thought content normal.         Judgment: Judgment normal.       Assessment & Plan      ICD-10-CM ICD-9-CM    1. Essential hypertension  I10 401.9 lisinopriL (PRINIVIL, ZESTRIL) 20 mg tablet   2. Peripheral edema  R60.9 782.3    3. Arthritis/arthropathy of multiple joints  M12.9 716.99    4. Severe obesity ([address]  E66.01 278.01        1. Essential hypertension  Continue to monitor. Daughter stated BP usually closer to goal less than 130/80 at home. - lisinopriL (PRINIVIL, ZESTRIL) 20 mg tablet; Take 1 Tab by mouth daily. Dispense: 90 Tab; Refill: 1    2. Peripheral edema  Patient has not had an assessment by vascular surgeon that she remembers. Would like her to have a vascular assessment and possible treatment for lymphedema by PT. Will need to find a vascular provider that is convenient for her to reach. 3. Arthritis/arthropathy of multiple joints  Hopefully patient can resume her water PT at [address] [place] [address] and continue under care of a new pain management specialist.    4. Severe obesity ([address]  Patient concerned about her weight gain.   Because she is on a number of medications which have CNS side effects that can affect respiration and cause drowziness, reluctant to prescribe her loss medicines that are controlled or have potential for tolerance. Advised her to check with her insurance about any coverage for injectable Saxenda as a weight loss medication. Continue to encourage healthy food choices and portion control. I have discussed the diagnosis with the patient and the intended plan as seen in the above orders. Pt/caretaker has expressed understanding. Questions were answered concerning future plans. I have discussed medication side effects and warnings as indicated with the patient as well.     Alexander Heath, ISHAAN

## 2021-04-18 RX ORDER — FOLIC ACID 1 MG/1
TABLET ORAL
Qty: 90 TAB | Refills: 1 | Status: SHIPPED | OUTPATIENT
Start: 2021-04-18

## 2021-04-18 RX ORDER — LANOLIN ALCOHOL/MO/W.PET/CERES
CREAM (GRAM) TOPICAL
Qty: 90 TAB | Refills: 2 | Status: SHIPPED | OUTPATIENT
Start: 2021-04-18

## 2021-04-21 PROBLEM — M75.112 PARTIAL TEAR OF LEFT ROTATOR CUFF: Status: ACTIVE | Noted: 2021-04-21

## 2021-04-21 PROBLEM — M62.50 DISUSE MUSCLE ATROPHY: Status: ACTIVE | Noted: 2021-04-21

## 2021-04-21 PROBLEM — M75.111 PARTIAL TEAR OF RIGHT ROTATOR CUFF: Status: ACTIVE | Noted: 2021-04-21

## 2021-04-21 PROBLEM — M12.9 ARTHRITIS/ARTHROPATHY OF MULTIPLE JOINTS: Status: ACTIVE | Noted: 2021-04-21

## 2021-04-30 RX ORDER — AMITRIPTYLINE HYDROCHLORIDE 10 MG/1
10 TABLET, FILM COATED ORAL
Qty: 30 TAB | Refills: 2 | Status: SHIPPED | OUTPATIENT
Start: 2021-04-30 | End: 2022-06-08

## 2021-05-13 NOTE — PROGRESS NOTES
I  do attest that this note was reviewed and I was available for  ISHAAN ZHAO in this day of service and will follow along with ISHAAN Contreras.      Ben Said, DO

## 2021-06-25 ENCOUNTER — OFFICE VISIT (OUTPATIENT)
Dept: NEUROLOGY | Age: 76
End: 2021-06-25
Payer: MEDICARE

## 2021-06-25 VITALS
SYSTOLIC BLOOD PRESSURE: 164 MMHG | DIASTOLIC BLOOD PRESSURE: 81 MMHG | OXYGEN SATURATION: 99 % | RESPIRATION RATE: 20 BRPM | HEART RATE: 56 BPM | TEMPERATURE: 98.4 F

## 2021-06-25 DIAGNOSIS — G89.4 CHRONIC PAIN SYNDROME: ICD-10-CM

## 2021-06-25 DIAGNOSIS — M79.18 MYOFASCIAL MUSCLE PAIN: ICD-10-CM

## 2021-06-25 DIAGNOSIS — G60.8 HEREDITARY SENSORY/AUTONOMIC NEUROPATHY (HSN/HSAN): Primary | ICD-10-CM

## 2021-06-25 DIAGNOSIS — M79.7 FIBROMYALGIA: ICD-10-CM

## 2021-06-25 DIAGNOSIS — F51.01 PRIMARY INSOMNIA: ICD-10-CM

## 2021-06-25 DIAGNOSIS — R26.9 GAIT DISORDER: ICD-10-CM

## 2021-06-25 PROCEDURE — G8753 SYS BP > OR = 140: HCPCS | Performed by: PSYCHIATRY & NEUROLOGY

## 2021-06-25 PROCEDURE — G8754 DIAS BP LESS 90: HCPCS | Performed by: PSYCHIATRY & NEUROLOGY

## 2021-06-25 PROCEDURE — G8400 PT W/DXA NO RESULTS DOC: HCPCS | Performed by: PSYCHIATRY & NEUROLOGY

## 2021-06-25 PROCEDURE — G8427 DOCREV CUR MEDS BY ELIG CLIN: HCPCS | Performed by: PSYCHIATRY & NEUROLOGY

## 2021-06-25 PROCEDURE — 1100F PTFALLS ASSESS-DOCD GE2>/YR: CPT | Performed by: PSYCHIATRY & NEUROLOGY

## 2021-06-25 PROCEDURE — G8536 NO DOC ELDER MAL SCRN: HCPCS | Performed by: PSYCHIATRY & NEUROLOGY

## 2021-06-25 PROCEDURE — 3288F FALL RISK ASSESSMENT DOCD: CPT | Performed by: PSYCHIATRY & NEUROLOGY

## 2021-06-25 PROCEDURE — 99214 OFFICE O/P EST MOD 30 MIN: CPT | Performed by: PSYCHIATRY & NEUROLOGY

## 2021-06-25 PROCEDURE — 1090F PRES/ABSN URINE INCON ASSESS: CPT | Performed by: PSYCHIATRY & NEUROLOGY

## 2021-06-25 PROCEDURE — G8417 CALC BMI ABV UP PARAM F/U: HCPCS | Performed by: PSYCHIATRY & NEUROLOGY

## 2021-06-25 PROCEDURE — G8432 DEP SCR NOT DOC, RNG: HCPCS | Performed by: PSYCHIATRY & NEUROLOGY

## 2021-06-25 RX ORDER — DULOXETIN HYDROCHLORIDE 60 MG/1
60 CAPSULE, DELAYED RELEASE ORAL DAILY
Qty: 30 CAPSULE | Refills: 2 | Status: SHIPPED | OUTPATIENT
Start: 2021-06-25 | End: 2021-07-29 | Stop reason: SDUPTHER

## 2021-06-25 RX ORDER — ZOLPIDEM TARTRATE 10 MG/1
20 TABLET ORAL
Qty: 60 TABLET | Refills: 3 | Status: SHIPPED | OUTPATIENT
Start: 2021-06-25 | End: 2021-10-06 | Stop reason: SDUPTHER

## 2021-06-25 RX ORDER — GABAPENTIN 800 MG/1
800 TABLET ORAL 3 TIMES DAILY
COMMUNITY
End: 2021-09-23 | Stop reason: SDUPTHER

## 2021-06-25 NOTE — PROGRESS NOTES
Neurology Progress Note    NAME:  Brian Alejandro   :   1945   MRN:   [id number]     Date/Time:  2021  Subjective:      Brian Alejandro is a 68 y.o. female here today for follow-up for neuropathy, gait disorder, chronic pain  Patient was accompanied by visit. Patient says she still having a lot of pain mostly in the legs, also difficulty sleeping,  patient's longtime management is said to be leaving  his practice which means that patient has to look for pain management specialist.  She says she is doing relatively well, started physical therapy with aqua therapy and she seems to be enjoying it. She has not had any falls since last visit. Patient said her pain is diffused, unrelenting, aggravated by activity. Patient is dependent on motorized wheelchair to ambulate. Patient says she is having difficulty going to sleep and staying asleep, because of that, her physician was giving her Ambien 20 mg nightly  I told patient that we are managed to get her pain management specialist.  Meanwhile patient was referred to pain management specialist-Dr. Real Mcclain. Additional plan discussed with patient  I had wanted to change patient from Neurontin to Lyrica, but patient did not like Lyrica and therefore went back to Neurontin  She was given Ambien while asleep 20 mg p.o. nightly until she finds we will continue to manage her sleep problem. I had wanted to send patient for sleep study but it appears that patient already had one. Patient says she is unable to open the joints worse with shoulder joint, she can barely raise her shoulder above her head.   Blood work reviewed with patient was unremarkable  Review of Systems - General ROS: positive for  - fatigue, night sweats, sleep disturbance and weight gain  Psychological ROS: positive for - anxiety and sleep disturbances  Ophthalmic ROS: positive for - blurry vision and decreased vision  ENT ROS: positive for - headaches, tinnitus and visual changes  Allergy and Immunology ROS: negative  Hematological and Lymphatic ROS: negative  Breast ROS: negative for breast lumps  Respiratory ROS: no cough, shortness of breath, or wheezing  Cardiovascular ROS: no chest pain or dyspnea on exertion  Gastrointestinal ROS: no abdominal pain, change in bowel habits, or black or bloody stools  Genito-Urinary ROS: no dysuria, trouble voiding, or hematuria  Musculoskeletal ROS: positive for - gait disturbance, joint pain, joint stiffness, joint swelling, muscle pain and muscular weakness  Neurological ROS: positive for - gait disturbance, headaches, impaired coordination/balance, numbness/tingling, visual changes and weakness  Dermatological ROS: negative          Medications reviewed:  Current Outpatient Medications   Medication Sig Dispense Refill    gabapentin (NEURONTIN) 800 mg tablet Take 800 mg by mouth three (3) times daily.  zolpidem (Ambien) 10 mg tablet Take 2 Tablets by mouth nightly. Max Daily Amount: 20 mg. 60 Tablet 3    DULoxetine (CYMBALTA) 60 mg capsule Take 1 Capsule by mouth daily. 30 Capsule 2    Wixela Inhub 250-50 mcg/dose diskus inhaler 1 PUFF TWICE A DAY 1 Inhaler 5    amitriptyline (ELAVIL) 10 mg tablet Take 1 Tab by mouth nightly. 30 Tab 2    folic acid (FOLVITE) 1 mg tablet TAKE 1 TABLET BY MOUTH EVERY DAY 90 Tab 1    pyridoxine, vitamin B6, (VITAMIN B-6) 50 mg tablet TAKE 1 TABLET BY MOUTH EVERY DAY 90 Tab 2    lisinopriL (PRINIVIL, ZESTRIL) 20 mg tablet Take 1 Tab by mouth daily. 90 Tab 1    albuterol (PROVENTIL HFA, VENTOLIN HFA, PROAIR HFA) 90 mcg/actuation inhaler INHALE 1 PUFF BY MOUTH EVERY 4 HOURS AS NEEDED      ibuprofen (MOTRIN) 200 mg tablet Take 200 mg by mouth every six (6) hours as needed.  amLODIPine (NORVASC) 10 mg tablet Take 10 mg by mouth daily.  HYDROcodone-acetaminophen (NORCO)  mg tablet Take 1 Tab by mouth every eight (8) hours as needed for Pain.       docusate sodium (COLACE) 100 mg capsule TAKE 1 CAPSULE BY MOUTH TWICE A DAY FOR 90 DAYS (Patient not taking: Reported on 6/25/2021)      pregabalin (LYRICA) 75 mg capsule Take 1 Cap by mouth two (2) times a day. Max Daily Amount: 150 mg. (Patient not taking: Reported on 6/25/2021) 60 Cap 1    bisacodyL (DULCOLAX) 5 mg EC tablet Take 2 Tabs by mouth daily. (Patient not taking: Reported on 6/25/2021) 90 Tab 3        Objective:   Vitals:  Vitals:    06/25/21 1213   BP: (!) 164/81   Pulse: (!) 56   Resp: 20   Temp: 98.4 °F (36.9 °C)   SpO2: 99%   PainSc:   8   PainLoc: Generalized               Lab Data Reviewed:  Lab Results   Component Value Date/Time    WBC 5.6 09/07/2017 12:57 PM    HCT 36.6 09/07/2017 12:57 PM    HGB 11.4 (L) 09/07/2017 12:57 PM    PLATELET 322 88/57/2797 12:57 PM       Lab Results   Component Value Date/Time    Sodium 140 09/07/2017 12:57 PM    Potassium 4.0 09/07/2017 12:57 PM    Chloride 103 09/07/2017 12:57 PM    CO2 31 09/07/2017 12:57 PM    Glucose 117 (H) 09/07/2017 12:57 PM    BUN 16 09/07/2017 12:57 PM    Creatinine 0.85 09/07/2017 12:57 PM    Calcium 9.1 09/07/2017 12:57 PM       No components found for: TROPQUANT    No results found for: OWEN      No results found for: HBA1C, SMI5VIYZ, KUI8LVEM     Lab Results   Component Value Date/Time    Vitamin B12 >2000 (H) 04/08/2021 01:21 PM       No results found for: OWEN, ANARX, ANAIGG, XBANA    No results found for: CHOL, CHOLPOCT, CHOLX, CHLST, CHOLV, HDL, HDLPOC, HDLP, LDL, LDLCPOC, LDLC, DLDLP, VLDLC, VLDL, TGLX, TRIGL, TRIGP, TGLPOCT, CHHD, CHHDX      CT Results (recent):  Results from [place] encounter on 01/23/19    CT LOW EXT LT WO CONT    Narrative  EXAM: CT LOW EXT LT WO CONT    INDICATION: Severe left knee pain. Left knee replacement on unspecified date. COMPARISON: [The views on 12/27/2016. TECHNIQUE: Helical CT of the left knee with three-dimensional, coronal and  sagittal reformats. Images reviewed in soft tissue and bone windows.   CT dose  reduction was achieved through the use of a standardized protocol tailored for  this examination and automatic exposure control for dose modulation. CONTRAST: None. FINDINGS: Hardware: Metal artifact arises from the femoral and tibial components  of the knee prosthesis. Patellar component is radiolucent and difficult to  evaluate. No lucency around the components. Tibial component is partially  secured with cement. Bones: Mild osteopenia. No acute fracture. No suspicious bone lesion. Joint fluid: Small suprapatellar joint effusion. No evidence of radiolucent  loose body. Articulations: Tricompartment prosthesis. Tendons: Extensor mechanism is intact within the limitation of metal artifact. Muscles: Mild atrophy. Soft tissue mass: None. Stranding in the subcutaneous fat lateral to the distal  femur may represent soft tissue contusion. Impression  IMPRESSION:  1. Left knee prosthesis is in good position without evidence of fracture or  complication. 2. Nonspecific joint effusion. 3. Fat stranding lateral to the distal femur may represent soft tissue contusion  in the proper clinical setting. No drainable fluid collection. MRI Results (recent):  Results from [place] encounter on 07/02/13    MRI SHOULDER LT WO CONT    Narrative  **Final Report**      ICD Codes / Adm. Diagnosis: 726.2  719.41 / Other affections of shoulder r  Pain in joint, shoulder shameka  Examination:  MR SHOULDER WO CON LT  - [id number] - Jul 2 2013  3:04PM  Accession No:  [id number]  Reason:  rct      REPORT:  EXAM:  MR SHOULDER WO CON LT    INDICATION: Severe osteoarthritis. COMPARISON: None    TECHNIQUE: Axial proton density fat-saturated; oblique coronal T1, T2  fat-saturated, and proton density fat-saturated; and oblique sagittal T2  fat-saturated MRI of the left shoulder. CONTRAST: None. FINDINGS: A.C. joint: Moderate osteoarthritis. Superior osteophytes.   Anterior acromion process type: 2    Glenohumeral articular cartilage: Severe osteoarthritis. Bulky marginal  osteophytes. Extensive subchondral cyst formation. Flattening and volume  loss of the glenoid. Glenoid measures approximately 2.8 cm AP by 1.4 cm  transverse by 2.3 cm craniocaudal.    Bone marrow: No acute fracture or dislocation. Joint fluid: Small glenohumeral joint effusion contains debris and mild  synovitis. Largest loose body measures 4 mm in the posterior, inferior  glenohumeral joint space. Rotator cuff tendons: Irregular partial-thickness tears of the distal,  anterior supraspinatus tendon. Infraspinatus and teres minor tendons are  intact. Partial-thickness and intrasubstance tears of the distal  subscapularis tendon. Biceps tendon: Mild intra-articular biceps tendinosis. No full-thickness  tear or retraction. Muscles: Mild diffuse muscle atrophy. No focal atrophy or edema. Glenoid labrum: Degenerated. Joint capsule: within normal limits. Soft tissue mass: None. IMPRESSION:  1. Severe glenohumeral joint osteoarthritis. 2. Partial-thickness rotator cuff tears. No full-thickness tear or  retraction. 3. Mild diffuse muscle atrophy. Signing/Reading Doctor: Gm Urbano ([id number])  Approved: Gm Urbano ([id number])  Jul 2 2013  3:55PM      IR Results (recent):  No results found for this or any previous visit. VAS/US Results (recent):  No results found for this or any previous visit. PHYSICAL EXAM:  General:    Alert, cooperative, no distress, appears stated age. Head:   Normocephalic, without obvious abnormality, atraumatic. Eyes:   Conjunctivae/corneas clear. PERRLA  Nose:  Nares normal. No drainage or sinus tenderness. Throat:    Lips, mucosa, and tongue normal.  No Thrush  Neck:  Supple, symmetrical,  no adenopathy, thyroid: non tender    no carotid bruit and no JVD. Paraspinal tenderness  Back:    Symmetric, diffuse tenderness. Lungs:   Clear to auscultation bilaterally. No Wheezing or Rhonchi. No rales.   Chest wall:  No tenderness or deformity. No Accessory muscle use. Heart:   Regular rate and rhythm,  no murmur, rub or gallop. Abdomen:   Soft, non-tender. Not distended. Bowel sounds normal. No masses  Extremities: Extremities normal, atraumatic, No cyanosis. No edema. No clubbing. Decreased range of motion shoulder joint  Skin:     Texture, turgor normal. No rashes or lesions. Not Jaundiced  Lymph nodes: Cervical, supraclavicular normal.  Psych:  Good insight. Depressed. Anxious. NEUROLOGICAL EXAM:  Appearance: The patient is well developed, well nourished, provides a coherent history and is in no acute distress. Mental Status: Oriented to time, place and person. Mood and affect appropriate. Cranial Nerves:   Intact visual fields. Fundi are benign. LIZ, EOM's full, no nystagmus, no ptosis. Facial sensation is normal. Corneal reflexes are intact. Facial movement is symmetric. Hearing is normal bilaterally. Palate is midline with normal sternocleidomastoid and trapezius muscles are normal. Tongue is midline. Motor:  4+/5 strength in upper extremity and 3+/5 lower extremity proximal and distal muscles. Normal bulk and tone. No fasciculations. Reflexes:   Deep tendon reflexes 2+/4 and symmetrical.   Sensory:    Decreased sensation to touch, pinprick and vibration. Gait:   Unsteady gait. Ambulates with motorized wheelchair   Tremor:   No tremor noted. Cerebellar:  No cerebellar signs present. Neurovascular:  Normal heart sounds and regular rhythm, peripheral pulses intact, and no carotid bruits. Assesment  1. Hereditary sensory/autonomic neuropathy (HSN/HSAN)  Neurontin    2. Chronic pain syndrome    - REFERRAL TO PAIN MANAGEMENT    3. Myofascial muscle pain  Continue physical therapy    4. Gait disorder  Physical therapy    5. Fibromyalgia    - REFERRAL TO PAIN MANAGEMENT    6. Primary insomnia        - zolpidem (Ambien) 10 mg tablet; Take 2 Tablets by mouth nightly. Max Daily Amount: 20 mg. Dispense: 60 Tablet; Refill: 3    ___________________________________________________  PLAN: Medication and plan discussed with patient and daughter      ICD-10-CM ICD-9-CM    1. Hereditary sensory/autonomic neuropathy (HSN/HSAN)  G60.8 356.2      356.0    2. Chronic pain syndrome  G89.4 338.4 REFERRAL TO PAIN MANAGEMENT   3. Myofascial muscle pain  M79.18 729.1    4. Gait disorder  R26.9 781.2    5. Fibromyalgia  M79.7 729.1 REFERRAL TO PAIN MANAGEMENT   6. Primary insomnia  F51.01 307.42 zolpidem (Ambien) 10 mg tablet     Follow-up and Dispositions    · Return in about 3 months (around 9/25/2021).              ___________________________________________________    Attending Physician: Rhonda Rucker MD

## 2021-07-29 DIAGNOSIS — M79.7 FIBROMYALGIA: Primary | ICD-10-CM

## 2021-07-29 RX ORDER — DULOXETIN HYDROCHLORIDE 60 MG/1
60 CAPSULE, DELAYED RELEASE ORAL DAILY
Qty: 90 CAPSULE | Refills: 0 | Status: SHIPPED | OUTPATIENT
Start: 2021-07-29 | End: 2021-10-27

## 2021-07-29 NOTE — TELEPHONE ENCOUNTER
Last office visit 6/25/2021  Last med refill 6/25/21 for 30 day with 2 refills  Requesting full 90 day supply for insurance

## 2021-08-04 ENCOUNTER — TELEPHONE (OUTPATIENT)
Dept: NEUROLOGY | Age: 76
End: 2021-08-04

## 2021-08-04 DIAGNOSIS — M79.18 MYOFASCIAL MUSCLE PAIN: ICD-10-CM

## 2021-08-04 DIAGNOSIS — R26.9 GAIT DISORDER: Primary | ICD-10-CM

## 2021-08-04 DIAGNOSIS — G62.9 POLYNEUROPATHY: ICD-10-CM

## 2021-08-04 DIAGNOSIS — M79.7 FIBROMYALGIA: ICD-10-CM

## 2021-08-04 NOTE — TELEPHONE ENCOUNTER
Called to say that the patient wants aquatic therapy and this needs to be added to her physical therapy order and she is coming in at 10:30am today.  Fax [phone]

## 2021-08-10 ENCOUNTER — OFFICE VISIT (OUTPATIENT)
Dept: FAMILY MEDICINE CLINIC | Age: 76
End: 2021-08-10
Payer: MEDICARE

## 2021-08-10 VITALS
OXYGEN SATURATION: 96 % | HEART RATE: 63 BPM | WEIGHT: 270 LBS | HEIGHT: 71 IN | TEMPERATURE: 97.8 F | SYSTOLIC BLOOD PRESSURE: 134 MMHG | DIASTOLIC BLOOD PRESSURE: 71 MMHG | RESPIRATION RATE: 20 BRPM | BODY MASS INDEX: 37.8 KG/M2

## 2021-08-10 DIAGNOSIS — N95.1 FEMALE CLIMACTERIC STATE: ICD-10-CM

## 2021-08-10 DIAGNOSIS — G89.4 CHRONIC PAIN SYNDROME: ICD-10-CM

## 2021-08-10 DIAGNOSIS — L60.2 OVERGROWN TOENAILS: ICD-10-CM

## 2021-08-10 DIAGNOSIS — L03.116 CELLULITIS OF LEFT LOWER EXTREMITY: Primary | ICD-10-CM

## 2021-08-10 DIAGNOSIS — I73.9 PVD (PERIPHERAL VASCULAR DISEASE) (HCC): ICD-10-CM

## 2021-08-10 PROCEDURE — G8427 DOCREV CUR MEDS BY ELIG CLIN: HCPCS | Performed by: NURSE PRACTITIONER

## 2021-08-10 PROCEDURE — G8754 DIAS BP LESS 90: HCPCS | Performed by: NURSE PRACTITIONER

## 2021-08-10 PROCEDURE — G8536 NO DOC ELDER MAL SCRN: HCPCS | Performed by: NURSE PRACTITIONER

## 2021-08-10 PROCEDURE — G8432 DEP SCR NOT DOC, RNG: HCPCS | Performed by: NURSE PRACTITIONER

## 2021-08-10 PROCEDURE — G8752 SYS BP LESS 140: HCPCS | Performed by: NURSE PRACTITIONER

## 2021-08-10 PROCEDURE — 3288F FALL RISK ASSESSMENT DOCD: CPT | Performed by: NURSE PRACTITIONER

## 2021-08-10 PROCEDURE — G8400 PT W/DXA NO RESULTS DOC: HCPCS | Performed by: NURSE PRACTITIONER

## 2021-08-10 PROCEDURE — 99214 OFFICE O/P EST MOD 30 MIN: CPT | Performed by: NURSE PRACTITIONER

## 2021-08-10 PROCEDURE — G8417 CALC BMI ABV UP PARAM F/U: HCPCS | Performed by: NURSE PRACTITIONER

## 2021-08-10 PROCEDURE — 1100F PTFALLS ASSESS-DOCD GE2>/YR: CPT | Performed by: NURSE PRACTITIONER

## 2021-08-10 PROCEDURE — 1090F PRES/ABSN URINE INCON ASSESS: CPT | Performed by: NURSE PRACTITIONER

## 2021-08-10 RX ORDER — CEPHALEXIN 500 MG/1
500 CAPSULE ORAL 4 TIMES DAILY
Qty: 28 CAPSULE | Refills: 0 | Status: SHIPPED | OUTPATIENT
Start: 2021-08-10 | End: 2021-08-17

## 2021-08-10 NOTE — PROGRESS NOTES
Chief Complaint   Patient presents with    Skin Problem     had a blister on her left leg that's busted and looks like an open wound ( spots)    Swelling     feet swells and need to know what kind of shoes to order    Other     concerned about black toenails      Visit Vitals  /71 (BP 1 Location: Left arm, BP Patient Position: Sitting, BP Cuff Size: Adult)   Pulse 63   Temp 97.8 °F (36.6 °C) (Temporal)   Resp 20   Ht 5' 11\" (1.803 m)   Wt 270 lb (122.5 kg)   SpO2 96%   BMI 37.66 kg/m²     Subjective  Elvia Lundberg is a 68 y.o. female. HPI:   Pt accompanied by her daughter. Pt presented for f/u w/ c/o having a blister on her left leg that's busted and looks like an open wound. Both her legs and feet are swollen and she does not know what kind of shoes to order. She also has black toenails on both feet that are worrisome to her. Pt has a multitude of co-morbidites to include neuropathy, fibromyalgia, hypertension, DJD, asthma, chronic pain syndrome, insomnia, lymphedema and PVD. She is under the care of neurologist Jeramy Tiwari at [facility] for the neuropathy. Pt stated he is going to do an EMG on her. Pt stated she has had the neuropathy for more than 30 yrs. Since pt, her daughter and pt's son all have neuropathy,  thinks pt has hereditary sensory/ autonomic neuropathy. Pt has aching  pains in many joints- has bilateral shoulder, arm, hand, knee pain and has been on Norco for years. Stated her back does not hurt her that much. However, her shoulders are \"gone\". Her hands give her a lot of pain. Deterioration of cartilage in her shoulders. The L knee hurts really bad- She has to put on magnets on both legs. The R knee denia. Did PT in 2020. She stated that she is about to start PT again at [address] [place] [address] in their pool.    She was under care of Dr. Ellyn Crespo at [address] [place] [address] but they are doing away w/ their physician group and pain management so now needs a new pain . Pt somewhat perturbed that I would not agree to order her the pain medication routinely. Advised pt that I was not a chronic pain specialist and our clinic was a primary care clinic and not a chronic pain clinic.      Patient Active Problem List   Diagnosis Code    Severe obesity ([facility] [address] E66.01    Bilateral leg pain M79.604, M79.605    Bilateral leg weakness R29.898    Other chronic pain G89.29    Peripheral neuropathy G62.9    Essential hypertension I10    Osteoarthritis M19.90    Drug-induced constipation K59.03    Insomnia G47.00    Asthma J45.909    Peripheral edema R60.9    Partial tear of left rotator cuff M75.112    Partial tear of right rotator cuff M75.111    Disuse muscle atrophy M62.50    Arthritis/arthropathy of multiple joints M12.9    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     Past Medical History:   Diagnosis Date    Asthma      Past Surgical History:   Procedure Laterality Date    HX ORTHOPAEDIC       Current Outpatient Medications   Medication Instructions    albuterol (PROVENTIL HFA, VENTOLIN HFA, PROAIR HFA) 90 mcg/actuation inhaler INHALE 1 PUFF BY MOUTH EVERY 4 HOURS AS NEEDED    amitriptyline (ELAVIL) 10 mg, Oral, EVERY BEDTIME    amLODIPine (NORVASC) 10 mg, Oral, DAILY    bisacodyL (DULCOLAX) 10 mg, Oral, DAILY    docusate sodium (COLACE) 100 mg capsule TAKE 1 CAPSULE BY MOUTH TWICE A DAY FOR 90 DAYS    DULoxetine (CYMBALTA) 60 mg, Oral, DAILY    folic acid (FOLVITE) 1 mg tablet TAKE 1 TABLET BY MOUTH EVERY DAY    gabapentin (NEURONTIN) 800 mg, Oral, 3 TIMES DAILY    HYDROcodone-acetaminophen (NORCO)  mg tablet 1 Tablet, Oral, EVERY 8 HOURS AS NEEDED    ibuprofen (MOTRIN) 200 mg, Oral, EVERY 6 HOURS AS NEEDED    lisinopriL (PRINIVIL, ZESTRIL) 20 mg, Oral, DAILY    pyridoxine, vitamin B6, (VITAMIN B-6) 50 mg tablet TAKE 1 TABLET BY MOUTH EVERY DAY    Wixela Inhub 250-50 mcg/dose diskus inhaler 1 PUFF TWICE A DAY    zolpidem (AMBIEN) 20 mg, Oral, EVERY BEDTIME     Allergies   Allergen Reactions    Morphine Other (comments)     hallucinations    Morphine Anxiety     Social History     Tobacco Use    Smoking status: Never Smoker    Smokeless tobacco: Never Used   Vaping Use    Vaping Use: Never used   Substance Use Topics    Alcohol use: No    Drug use: No     Family History   Problem Relation Age of Onset    Arthritis Mother     Heart defect Father     Diabetes Sister      Review of Systems   Constitutional: Negative for chills and fever. HENT: Negative for ear pain and sore throat. Respiratory: Negative for cough, shortness of breath and wheezing. Cardiovascular: Positive for chest pain, palpitations and leg swelling. Had an episode last week. She has a cardilogist but not seen in awhile. Gastrointestinal: Negative for abdominal pain, constipation, diarrhea, heartburn, nausea and vomiting. Genitourinary: Negative for dysuria. Musculoskeletal: Positive for joint pain. Skin: Negative for itching and rash. Neurological: Positive for tingling, sensory change, weakness and headaches. Negative for dizziness. Pt uses electric scooter when out of house and uses a walker at home. Psychiatric/Behavioral: Positive for depression. The patient is nervous/anxious and has insomnia. Objective  Physical Exam  Constitutional:       General: She is not in acute distress. Appearance: Normal appearance. She is obese. HENT:      Head: Normocephalic. Right Ear: External ear normal.      Left Ear: External ear normal.      Nose: Nose normal.   Eyes:      Conjunctiva/sclera: Conjunctivae normal.   Neck:      Vascular: No carotid bruit. Cardiovascular:      Rate and Rhythm: Normal rate and regular rhythm. Heart sounds: Normal heart sounds. No murmur heard. Pulmonary:      Effort: Pulmonary effort is normal. No respiratory distress. Breath sounds: Normal breath sounds. Musculoskeletal:         General: Tenderness present. No swelling. Normal range of motion. Cervical back: Neck supple. Right lower leg: Edema present. Left lower leg: Edema present. Skin:     General: Skin is warm and dry. Coloration: Skin is not jaundiced. Findings: Lesion present. No rash. Comments: Dark discoloration of both lower legs. She had a fluid filled blister and burst on lower L leg. Shallow wound, scant drainage, no odor. Also has many black and misshapen nails w/ debris on both feet. Neurological:      Mental Status: She is alert and oriented to person, place, and time. Motor: Weakness present. Gait: Gait abnormal.      Comments: In scooter for entire appt. Psychiatric:         Mood and Affect: Mood normal.         Behavior: Behavior normal.         Thought Content: Thought content normal.         Judgment: Judgment normal.       Assessment & Plan      ICD-10-CM ICD-9-CM    1. Cellulitis of left lower extremity  L03.116 682.6 cephALEXin (KEFLEX) 500 mg capsule   2. Chronic pain syndrome  G89.4 338.4 REFERRAL TO PAIN MANAGEMENT   3. PVD (peripheral vascular disease) ([facility])  I73.9 443.9 REFERRAL TO VASCULAR SURGERY   4. Overgrown toenails  L60.2 703.8 REFERRAL TO PODIATRY   5. Female climacteric state  N95.1 627.2 DEXA BONE DENSITY STUDY AXIAL     1. Cellulitis of left lower extremity  Pt advised to complete entire course of antibiotics even if leg looks better. Avoid rubbing leg and pat w/ water to cleanse. - cephALEXin (KEFLEX) 500 mg capsule; Take 1 Capsule by mouth four (4) times daily for 7 days. Dispense: 28 Capsule; Refill: 0    2. Chronic pain syndrome  Pt advised to call for appt @ pain clinic asap to take over her pain management.     - REFERRAL TO PAIN MANAGEMENT    3. PVD (peripheral vascular disease) ([facility] [address]  Pt agreeable to seeing vascular surgeon for reeval. Advised to call for an appt. - REFERRAL TO VASCULAR SURGERY    4. Overgrown toenails  Advised to call for an appt asap. - REFERRAL TO PODIATRY    5. Female climacteric state  Pt unclear if she has ever had a bone density test and if so, she does not remember when. Review results when available. - DEXA BONE DENSITY STUDY AXIAL; Future    I have discussed the diagnosis with the patient and the intended plan as seen in the above orders. Pt/caretaker has expressed understanding. Questions were answered concerning future plans. I have discussed medication side effects and warnings as indicated with the patient as well.     Rosey Hsu, ISHAAN

## 2021-08-10 NOTE — PROGRESS NOTES
Visit Vitals  /71 (BP 1 Location: Left arm, BP Patient Position: Sitting, BP Cuff Size: Adult)   Pulse 63   Temp 97.8 °F (36.6 °C) (Temporal)   Resp 20   Ht 5' 11\" (1.803 m)   Wt 270 lb (122.5 kg)   SpO2 96%   BMI 37.66 kg/m²     Chief Complaint   Patient presents with    Skin Problem     had a blister on her left leg that's busted and looks like an open wound ( spots)    Swelling     feet swells and need to know what kind of shoes to order    Other     concerned about black toenails

## 2021-09-06 PROBLEM — G60.8: Status: ACTIVE | Noted: 2021-09-06

## 2021-09-10 ENCOUNTER — TELEPHONE (OUTPATIENT)
Dept: FAMILY MEDICINE CLINIC | Age: 76
End: 2021-09-10

## 2021-09-10 DIAGNOSIS — M12.9 ARTHRITIS/ARTHROPATHY OF MULTIPLE JOINTS: Primary | ICD-10-CM

## 2021-09-10 RX ORDER — HYDROCODONE BITARTRATE AND ACETAMINOPHEN 10; 325 MG/1; MG/1
1 TABLET ORAL
Qty: 90 TABLET | Refills: 0 | Status: SHIPPED | OUTPATIENT
Start: 2021-09-10 | End: 2021-09-13 | Stop reason: SDUPTHER

## 2021-09-10 NOTE — TELEPHONE ENCOUNTER
Pt's daughter called to say that pt has an appt w/ new pain  but can't be seen until Oct 2021.  I agreed to refill her opioid medication until she can be seen by new pain specialist.

## 2021-09-13 DIAGNOSIS — M12.9 ARTHRITIS/ARTHROPATHY OF MULTIPLE JOINTS: ICD-10-CM

## 2021-09-13 RX ORDER — HYDROCODONE BITARTRATE AND ACETAMINOPHEN 10; 325 MG/1; MG/1
2 TABLET ORAL
Qty: 90 TABLET | Refills: 0 | Status: SHIPPED | OUTPATIENT
Start: 2021-09-29 | End: 2021-09-28 | Stop reason: SDUPTHER

## 2021-09-13 RX ORDER — NALOXONE HYDROCHLORIDE 4 MG/.1ML
SPRAY NASAL
Qty: 1 EACH | Refills: 1 | Status: SHIPPED | OUTPATIENT
Start: 2021-09-13

## 2021-09-13 RX ORDER — HYDROCODONE BITARTRATE AND ACETAMINOPHEN 10; 325 MG/1; MG/1
2 TABLET ORAL
Qty: 90 TABLET | Refills: 0 | Status: SHIPPED | OUTPATIENT
Start: 2021-09-13 | End: 2021-09-28 | Stop reason: SDUPTHER

## 2021-09-13 NOTE — PROGRESS NOTES
I put in two 15 day refills of pt's [place] medication. Review of PDMP report on website indicated that her rehab specialist- Dr. Gamaliel Gonzalez -was ordering 180 tablets monthly for pt to take 1-2 tabs as needed every 8 hours. He is no longer working in out patient rehab department of [address] [place] [address] as his job has been eliminated. He will be working in patient only. I agreed to order pt's pain medication for 30 days. Her appt w/ a new provider- Dr. Nick Fields is on Oct 29th. Pt's daughter was advised to call his office daily to see if any cancellations so pt can be seen earlier. Otherwise, pt will need to conserve the medication in order to have enough until her appt.

## 2021-09-13 NOTE — TELEPHONE ENCOUNTER
Daughter of the patient LVM on the nurse line to have you to call her back about a medicaton recently called in. The daughter stated there was an issue with it. Sebastian Gato (daughter) asked that you give her a call back at [phone] ASAP.

## 2021-09-21 ENCOUNTER — TRANSCRIBE ORDER (OUTPATIENT)
Dept: NEUROLOGY | Age: 76
End: 2021-09-21

## 2021-09-21 ENCOUNTER — TELEPHONE (OUTPATIENT)
Dept: NEUROLOGY | Age: 76
End: 2021-09-21

## 2021-09-21 NOTE — TELEPHONE ENCOUNTER
----- Message from Janine Bryson sent at 9/21/2021 11:57 AM EDT -----  Regarding: PRASHANT/MD/TELEPHONE  Contact: [phone]  General Message/Vendor Calls    Caller's first and last name: Marcos Key      Reason for call: Request for Gabapentin      Callback required yes/no and why: Yes      Best contact number(s): [phone]      Details to clarify the request: Patient would like to know if Dr. Daphne Jimenez will give her a prescription for Gabapentin that was previously discuss. Per patient she is currently out of the medication.     3rd attempt          Janine Bryson

## 2021-09-22 ENCOUNTER — TELEPHONE (OUTPATIENT)
Dept: NEUROLOGY | Age: 76
End: 2021-09-22

## 2021-09-22 NOTE — TELEPHONE ENCOUNTER
Per  fills since 8/1/2021:  8/1/21: Ambien 10mg #60 Dr. Ghazala Vela  8/10/21: Hydrocodone 10/325 #180 Dr. Ann Pepe  8/15/21: Gabapentin 800mg #120 Dr. Ann Pepe  8/29/21: Ambien 10mg #60 Dr. Sharif Oliveros  9/13/21: Hydrocodone #90 Tomasa Lora, FANTA  9/18/21 Gabapentin 800mg #28 Shira Course    Patient is to see pain specialist per phone call to PCP, but appointment 10/2021      I called the patient's daughter and this last fill was done by an ER doctor. This is for 10 days worth. There is an appointment for the pain specialist about 10/29/2021 with Dr. Fercho Cole. She takes Gabapentin 800mg x 1 four times daily. They are wanting to know if Dr. Sharif Oliveros will send in 1 month supply of Gabapentin until she is seen by pain management at the end of October.

## 2021-09-22 NOTE — TELEPHONE ENCOUNTER
----- Message from Triston Hoover sent at 9/22/2021  2:56 PM EDT -----  Regarding: / telephone  Medication Refill    Caller (if not patient): Danilo Albrecht      Relationship of caller (if not patient): simon      Best contact number(s): [phone]      Name of medication and dosage if known: Gabapentin      Is patient out of this medication (yes/no): yes      Pharmacy name: [facility] listed in chart? (yes/no): Yes  Pharmacy phone number: [phone]      Details to clarify the request: Pt has been out of medication resulting in pt going to ER. The prescription was discuss prior with  but the medication has not been refilled. Pt will like a call back regarding if they will be getting the medication. This is also the 4th attempt of the pt trying to get a refill.        Triston Hoover

## 2021-09-23 DIAGNOSIS — G62.9 NEUROPATHY: Primary | ICD-10-CM

## 2021-09-23 RX ORDER — GABAPENTIN 800 MG/1
800 TABLET ORAL 3 TIMES DAILY
Qty: 90 TABLET | Refills: 3 | Status: SHIPPED | OUTPATIENT
Start: 2021-09-23 | End: 2021-11-15

## 2021-09-24 ENCOUNTER — OFFICE VISIT (OUTPATIENT)
Dept: FAMILY MEDICINE CLINIC | Age: 76
End: 2021-09-24
Payer: MEDICARE

## 2021-09-24 VITALS
DIASTOLIC BLOOD PRESSURE: 82 MMHG | OXYGEN SATURATION: 97 % | RESPIRATION RATE: 20 BRPM | SYSTOLIC BLOOD PRESSURE: 152 MMHG | HEART RATE: 69 BPM | TEMPERATURE: 98.3 F

## 2021-09-24 DIAGNOSIS — M12.9 ARTHRITIS/ARTHROPATHY OF MULTIPLE JOINTS: Primary | ICD-10-CM

## 2021-09-24 DIAGNOSIS — I10 ESSENTIAL HYPERTENSION: ICD-10-CM

## 2021-09-24 PROCEDURE — G8427 DOCREV CUR MEDS BY ELIG CLIN: HCPCS | Performed by: NURSE PRACTITIONER

## 2021-09-24 PROCEDURE — G8417 CALC BMI ABV UP PARAM F/U: HCPCS | Performed by: NURSE PRACTITIONER

## 2021-09-24 PROCEDURE — 1090F PRES/ABSN URINE INCON ASSESS: CPT | Performed by: NURSE PRACTITIONER

## 2021-09-24 PROCEDURE — 1100F PTFALLS ASSESS-DOCD GE2>/YR: CPT | Performed by: NURSE PRACTITIONER

## 2021-09-24 PROCEDURE — G8753 SYS BP > OR = 140: HCPCS | Performed by: NURSE PRACTITIONER

## 2021-09-24 PROCEDURE — G8536 NO DOC ELDER MAL SCRN: HCPCS | Performed by: NURSE PRACTITIONER

## 2021-09-24 PROCEDURE — G8400 PT W/DXA NO RESULTS DOC: HCPCS | Performed by: NURSE PRACTITIONER

## 2021-09-24 PROCEDURE — 3288F FALL RISK ASSESSMENT DOCD: CPT | Performed by: NURSE PRACTITIONER

## 2021-09-24 PROCEDURE — 99214 OFFICE O/P EST MOD 30 MIN: CPT | Performed by: NURSE PRACTITIONER

## 2021-09-24 PROCEDURE — G8754 DIAS BP LESS 90: HCPCS | Performed by: NURSE PRACTITIONER

## 2021-09-24 PROCEDURE — G8432 DEP SCR NOT DOC, RNG: HCPCS | Performed by: NURSE PRACTITIONER

## 2021-09-24 NOTE — PROGRESS NOTES
Chief Complaint   Patient presents with    Follow-up     pain management     Visit Vitals  BP (!) 152/82 (BP 1 Location: Right arm, BP Patient Position: Sitting, BP Cuff Size: Adult)   Pulse 69   Temp 98.3 °F (36.8 °C) (Temporal)   Resp 20   SpO2 97%     Subjective  Cary Perez is a 68 y.o. female. HPI:  Pt presented for f/u. Accompanied by her daughter. Patient has a complex medical history with comorbidities of hypertension, bilateral leg weakness, peripheral neuropathy, fibromyalgia, osteoarthritis of multiple joints, chronic pain, insomnia, and peripheral edema. At last appt in 8/10/2021, pt had an open area on lower L leg after a blister broke and cellulitis developed around the open skin w/ increased edema over her baseline. She was Rxed a course of antibiotics and leg has greatly improved. Pt recently lost her long time rehab physician at [address] [place] [address] because they no longer employ medical providers for out patient services. The provider has been writing orders for pt's opioid pain medication for years. Now she has been referred to a pain specialist because our office and myself do not do chronic pain management. Pending appt near end of Oct 2021. Pt's current medication regime is Norco 10/325 - 2 tabs every 8 hours as needed. Pt feels she needs the medication every 8 hours or else she will have severe pain all over. Pt is limited in her mobility due to the pain. She uses a walker at her home and has an electric wheelchair at home that is new but now the seat has broken. She has a scooter for activities outside her home. She is having difficulty w/ the company that supplied the wheelchair. Pt would like a new wheelchair but since she just got the current one, highly unlikely she can get another new wheelchair at this time. She was  very upset, frustrated and tearful in office. Unfortunately, she sometimes has conflicts w/ her daughter which are also upsetting to her.    Pt has resumed pool therapy at [address] [place] [address] which she enjoys greatly. However, she is not always able to book a rehab slot in shallow end of pool due to Covid 19 restrictions on how many persons can be in the pool at one time. The therapists are not comfortable w/ working w/ her in deep end of pool for unclear reasons. 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 He has taken over the Rxing of gabapentin which the [address] [place] [address] provider was doing for pt until he left his practice. Per review of neuro note- Dr. Dixon Stockton wanted to change pt to Lyrica but pt did not like it and wanted to stay on gabapentin. He is also ordering pt duloxetine 60 mg but unclear if helpful for both pain control and depression. Pt has been Rxed zolpidem for a long time for her insomnia. Did discuss that combo of opioid pain medication and zolpidem not an ideal situation but she feels there is no other sleep aid that is helpful for her and she has been doing well on it for some time. B/P elevated in office @ 152/82. She is ordered lisinopril 20 mg and amlodipine 10 mg daily. Pt should continue checking B/P at home. Goal is < 130/80. I thought it might be elevated due to her emotional state today. Will consider increasing dose of lisinopril if needed.      Patient Active Problem List   Diagnosis Code    Severe obesity ([facility] [address] E66.01    Bilateral leg pain M79.604, M79.605    Bilateral leg weakness R29.898    Other chronic pain G89.29    Peripheral neuropathy G62.9    Essential hypertension I10    Osteoarthritis M19.90    Drug-induced constipation K59.03    Insomnia G47.00    Asthma J45.909    Peripheral edema R60.9    Partial tear of left rotator cuff M75.112    Partial tear of right rotator cuff M75.111    Disuse muscle atrophy M62.50    Arthritis/arthropathy of multiple joints M12.9    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     Past Medical History:   Diagnosis Date    Asthma      Past Surgical History:   Procedure Laterality Date    HX ORTHOPAEDIC       Current Outpatient Medications   Medication Instructions    albuterol (PROVENTIL HFA, VENTOLIN HFA, PROAIR HFA) 90 mcg/actuation inhaler INHALE 1 PUFF BY MOUTH EVERY 4 HOURS AS NEEDED    amitriptyline (ELAVIL) 10 mg, Oral, EVERY BEDTIME    amLODIPine (NORVASC) 10 mg, Oral, DAILY    bisacodyL (DULCOLAX) 10 mg, Oral, DAILY    docusate sodium (COLACE) 100 mg capsule TAKE 1 CAPSULE BY MOUTH TWICE A DAY FOR 90 DAYS    DULoxetine (CYMBALTA) 60 mg, Oral, DAILY    folic acid (FOLVITE) 1 mg tablet TAKE 1 TABLET BY MOUTH EVERY DAY    gabapentin (NEURONTIN) 800 mg, Oral, 3 TIMES DAILY    HYDROcodone-acetaminophen (NORCO)  mg tablet 2 Tablets, Oral, EVERY 8 HOURS AS NEEDED, This medication should last until 10/12/2021. I cannot do early refills.  ibuprofen (MOTRIN) 200 mg, Oral, EVERY 6 HOURS AS NEEDED    lisinopriL (PRINIVIL, ZESTRIL) 20 mg tablet TAKE 1 TABLET BY MOUTH EVERY DAY    naloxone (NARCAN) 4 mg/actuation nasal spray Use 1 spray intranasally, then discard. Repeat with new spray every 2 min as needed for opioid overdose symptoms, alternating nostrils.  pyridoxine, vitamin B6, (VITAMIN B-6) 50 mg tablet TAKE 1 TABLET BY MOUTH EVERY DAY    Wixela Inhub 250-50 mcg/dose diskus inhaler 1 PUFF TWICE A DAY    zolpidem (AMBIEN) 20 mg, Oral, EVERY BEDTIME     Allergies   Allergen Reactions    Morphine Other (comments)     hallucinations    Morphine Anxiety     Social History     Tobacco Use    Smoking status: Never Smoker    Smokeless tobacco: Never Used   Vaping Use    Vaping Use: Never used   Substance Use Topics    Alcohol use: No    Drug use: No     Family History   Problem Relation Age of Onset    Arthritis Mother     Heart defect Father     Diabetes Sister        Review of Systems   Constitutional: Negative for chills and fever.    Respiratory: Positive for shortness of breath. Negative for cough and wheezing. Cardiovascular: Positive for chest pain and palpitations. Gastrointestinal: Negative for nausea and vomiting. Genitourinary: Negative for urgency. Musculoskeletal: Positive for back pain and myalgias. Neurological: Positive for headaches. Negative for dizziness. Psychiatric/Behavioral: Positive for depression. The patient is nervous/anxious and has insomnia. Objective  Physical Exam  Constitutional:       General: She is not in acute distress. Appearance: Normal appearance. She is obese. HENT:      Head: Normocephalic. Right Ear: External ear normal.      Left Ear: External ear normal.      Nose: Nose normal.   Eyes:      Conjunctiva/sclera: Conjunctivae normal.   Neck:      Vascular: No carotid bruit. Cardiovascular:      Rate and Rhythm: Normal rate and regular rhythm. Heart sounds: Murmur heard. Pulmonary:      Effort: Pulmonary effort is normal. No respiratory distress. Breath sounds: Normal breath sounds. Musculoskeletal:         General: No swelling or tenderness. Normal range of motion. Cervical back: Neck supple. Right lower leg: Edema present. Left lower leg: Edema present. Comments: Pt in scooter for appt. Skin:     General: Skin is warm and dry. Coloration: Skin is not jaundiced. Findings: No rash. Neurological:      Mental Status: She is alert and oriented to person, place, and time. Mental status is at baseline. Sensory: Sensory deficit present. Motor: Weakness present. Psychiatric:         Mood and Affect: Mood normal.         Behavior: Behavior normal.         Thought Content: Thought content normal.         Judgment: Judgment normal.      Comments: Pt tearful and upset- briefly pounded the arm of her scooter in frustration and cried out. She stated she feels sometimes like she does not want to go on. No suicidal plan.  Pt vented some of the things that upset her w/ her family. Assessment & Plan      ICD-10-CM ICD-9-CM    1. Arthritis/arthropathy of multiple joints  M12.9 716.99    2. Essential hypertension  I10 401.9        1. Arthritis/arthropathy of multiple joints  Pt will continue pool therapy as often as she can attend and get an appt. Pending pain specialist appt at end of Oct 2021.     2. Essential hypertension  Will continue to monitor and adjust medication as indicated. I have discussed the diagnosis with the patient and the intended plan as seen in the above orders. Pt/caretaker has expressed understanding. Questions were answered concerning future plans. I have discussed medication side effects and warnings as indicated with the patient as well.     Ayana Edwards NP

## 2021-09-24 NOTE — PROGRESS NOTES
Visit Vitals  BP (!) 152/82 (BP 1 Location: Right arm, BP Patient Position: Sitting, BP Cuff Size: Adult)   Pulse 69   Temp 98.3 °F (36.8 °C) (Temporal)   Resp 20   SpO2 97%     Chief Complaint   Patient presents with    Follow-up     pain management     Discuss medications and appointments with pain management.

## 2021-09-24 NOTE — TELEPHONE ENCOUNTER
I called to notify the patient's daughter this has been sent in.   I left a message that the medication was sent in

## 2021-09-28 ENCOUNTER — TELEPHONE (OUTPATIENT)
Dept: FAMILY MEDICINE CLINIC | Age: 76
End: 2021-09-28

## 2021-09-28 DIAGNOSIS — M12.9 ARTHRITIS/ARTHROPATHY OF MULTIPLE JOINTS: Primary | ICD-10-CM

## 2021-09-28 RX ORDER — HYDROCODONE BITARTRATE AND ACETAMINOPHEN 10; 325 MG/1; MG/1
2 TABLET ORAL
Qty: 90 TABLET | Refills: 0 | Status: SHIPPED | OUTPATIENT
Start: 2021-09-28 | End: 2021-10-12

## 2021-09-28 NOTE — TELEPHONE ENCOUNTER
Pt daughter called and want to know will you give the okay to the pharmacy for pt to have her Hydrocodone-ace refill today because her 15 day supply was up yesterday. Pt daughter said when you send the Rx in on 09/13/2021 pt got it filled that day and start taking the medication the same day because she was out. The phar said they can filled it tomorrow but the pt is out per her daughter.  MARY

## 2021-09-28 NOTE — TELEPHONE ENCOUNTER
[facility] pharmacy called asking if they can refill pt Rx of Hydrocodone early.  MARY

## 2021-09-29 ENCOUNTER — OFFICE VISIT (OUTPATIENT)
Dept: PODIATRY | Age: 76
End: 2021-09-29
Payer: MEDICARE

## 2021-09-29 VITALS
TEMPERATURE: 96.8 F | DIASTOLIC BLOOD PRESSURE: 73 MMHG | HEIGHT: 71 IN | HEART RATE: 53 BPM | SYSTOLIC BLOOD PRESSURE: 150 MMHG | BODY MASS INDEX: 37.66 KG/M2

## 2021-09-29 DIAGNOSIS — L60.3 ONYCHODYSTROPHY: Primary | ICD-10-CM

## 2021-09-29 PROCEDURE — G8754 DIAS BP LESS 90: HCPCS | Performed by: PODIATRIST

## 2021-09-29 PROCEDURE — G8432 DEP SCR NOT DOC, RNG: HCPCS | Performed by: PODIATRIST

## 2021-09-29 PROCEDURE — G8536 NO DOC ELDER MAL SCRN: HCPCS | Performed by: PODIATRIST

## 2021-09-29 PROCEDURE — G8400 PT W/DXA NO RESULTS DOC: HCPCS | Performed by: PODIATRIST

## 2021-09-29 PROCEDURE — 1100F PTFALLS ASSESS-DOCD GE2>/YR: CPT | Performed by: PODIATRIST

## 2021-09-29 PROCEDURE — G8417 CALC BMI ABV UP PARAM F/U: HCPCS | Performed by: PODIATRIST

## 2021-09-29 PROCEDURE — 99203 OFFICE O/P NEW LOW 30 MIN: CPT | Performed by: PODIATRIST

## 2021-09-29 PROCEDURE — G8753 SYS BP > OR = 140: HCPCS | Performed by: PODIATRIST

## 2021-09-29 PROCEDURE — G8427 DOCREV CUR MEDS BY ELIG CLIN: HCPCS | Performed by: PODIATRIST

## 2021-09-29 PROCEDURE — 3288F FALL RISK ASSESSMENT DOCD: CPT | Performed by: PODIATRIST

## 2021-09-29 PROCEDURE — 1090F PRES/ABSN URINE INCON ASSESS: CPT | Performed by: PODIATRIST

## 2021-09-29 NOTE — PROGRESS NOTES
1. Have you been to the ER, urgent care clinic since your last visit? Hospitalized since your last visit? No    2. Have you seen or consulted any other health care providers outside of the [address] [place] [address] since your last visit? Include any pap smears or colon screening.  No     Chief Complaint   Patient presents with    Foot Exam    Nail Problem     ref by Harry Ohara NP for toenail fungus

## 2021-10-03 DIAGNOSIS — I10 ESSENTIAL HYPERTENSION: ICD-10-CM

## 2021-10-03 RX ORDER — LISINOPRIL 20 MG/1
TABLET ORAL
Qty: 90 TABLET | Refills: 3 | Status: SHIPPED | OUTPATIENT
Start: 2021-10-03

## 2021-10-06 ENCOUNTER — VIRTUAL VISIT (OUTPATIENT)
Dept: NEUROLOGY | Age: 76
End: 2021-10-06
Payer: MEDICARE

## 2021-10-06 DIAGNOSIS — G62.9 POLYNEUROPATHY: Primary | ICD-10-CM

## 2021-10-06 DIAGNOSIS — R26.9 GAIT DISORDER: ICD-10-CM

## 2021-10-06 DIAGNOSIS — G60.8 HEREDITARY SENSORY/AUTONOMIC NEUROPATHY (HSN/HSAN): ICD-10-CM

## 2021-10-06 DIAGNOSIS — I73.9 PVD (PERIPHERAL VASCULAR DISEASE) (HCC): ICD-10-CM

## 2021-10-06 DIAGNOSIS — M79.18 MYOFASCIAL MUSCLE PAIN: ICD-10-CM

## 2021-10-06 DIAGNOSIS — F51.01 PRIMARY INSOMNIA: ICD-10-CM

## 2021-10-06 DIAGNOSIS — G89.4 CHRONIC PAIN SYNDROME: ICD-10-CM

## 2021-10-06 DIAGNOSIS — M79.7 FIBROMYALGIA: ICD-10-CM

## 2021-10-06 PROCEDURE — G8400 PT W/DXA NO RESULTS DOC: HCPCS | Performed by: PSYCHIATRY & NEUROLOGY

## 2021-10-06 PROCEDURE — 1100F PTFALLS ASSESS-DOCD GE2>/YR: CPT | Performed by: PSYCHIATRY & NEUROLOGY

## 2021-10-06 PROCEDURE — G8432 DEP SCR NOT DOC, RNG: HCPCS | Performed by: PSYCHIATRY & NEUROLOGY

## 2021-10-06 PROCEDURE — G8417 CALC BMI ABV UP PARAM F/U: HCPCS | Performed by: PSYCHIATRY & NEUROLOGY

## 2021-10-06 PROCEDURE — 99214 OFFICE O/P EST MOD 30 MIN: CPT | Performed by: PSYCHIATRY & NEUROLOGY

## 2021-10-06 PROCEDURE — 1090F PRES/ABSN URINE INCON ASSESS: CPT | Performed by: PSYCHIATRY & NEUROLOGY

## 2021-10-06 PROCEDURE — G8427 DOCREV CUR MEDS BY ELIG CLIN: HCPCS | Performed by: PSYCHIATRY & NEUROLOGY

## 2021-10-06 PROCEDURE — G8536 NO DOC ELDER MAL SCRN: HCPCS | Performed by: PSYCHIATRY & NEUROLOGY

## 2021-10-06 PROCEDURE — G8756 NO BP MEASURE DOC: HCPCS | Performed by: PSYCHIATRY & NEUROLOGY

## 2021-10-06 PROCEDURE — 3288F FALL RISK ASSESSMENT DOCD: CPT | Performed by: PSYCHIATRY & NEUROLOGY

## 2021-10-06 RX ORDER — ZOLPIDEM TARTRATE 10 MG/1
20 TABLET ORAL
Qty: 60 TABLET | Refills: 3 | Status: SHIPPED | OUTPATIENT
Start: 2021-10-06 | End: 2022-02-14 | Stop reason: SDUPTHER

## 2021-10-06 NOTE — PROGRESS NOTES
Neurology Progress Note  Clifford Hoffman was seen by synchronous (real-time) audio-video technology on 10/06/21    Consent:  She and/or her healthcare decision maker is aware that this patient-initiated Telehealth encounter is a billable service, with coverage as determined by her insurance carrier. She is aware that she may receive a bill and has provided verbal consent to proceed: Yes    I was in the office while conducting this encounter. Pursuant to the emergency declaration under the [address] [place] [address] waiver authority and the [employer] Act, this Virtual  Visit was conducted, with patient's consent, to reduce the patient's risk of exposure to COVID-19 and provide continuity of care for an established patient. Services were provided through a video synchronous discussion virtually to substitute for in-person clinic visit. NAME:  Clifford Hoffman   :   1945   MRN:   [id number]     Date/Time:  10/6/2021  Subjective:     Clifford Hoffman is a 68 y.o. female here today for follow-up for neuropathy, gait disorder, chronic pain  Daughter was by the patient's side at the time of visit, and the daughter answers most of the questions  Patient continues to have a lot of pain mostly in the legs, also difficulty sleeping. Patient has recently established with another pain management specialist, however the pain management specialist says he would focus on the pain, will not prescribe Ambien as was the previous pain management specialist.  I discussed with patient and told her that I could do the Ambien for now why she is looking for sleep medicine specialist.  She says she is still in physical therapy with [facility] therapy and she seems to be enjoying it. She has not had any falls since last visit. Patient said her pain is diffused, unrelenting, aggravated by activity.   Patient is dependent on motorized wheelchair to ambulate. Patient says she is having difficulty going to sleep and staying asleep, because of that, her physician was giving her Ambien 20 mg nightly  I told patient that we are managed to get her pain management specialist.  Meanwhile patient was referred to pain management specialist-Dr. Fredis Jackson. Additional plan discussed with patient. Review of Systems - General ROS: positive for  - fatigue, night sweats, sleep disturbance and weight gain  Psychological ROS: positive for - anxiety and sleep disturbances  Ophthalmic ROS: positive for - blurry vision and decreased vision  ENT ROS: positive for - headaches, tinnitus and visual changes  Allergy and Immunology ROS: negative  Hematological and Lymphatic ROS: negative  Breast ROS: negative for breast lumps  Respiratory ROS: no cough, shortness of breath, or wheezing  Cardiovascular ROS: no chest pain or dyspnea on exertion  Gastrointestinal ROS: no abdominal pain, change in bowel habits, or black or bloody stools  Genito-Urinary ROS: no dysuria, trouble voiding, or hematuria  Musculoskeletal ROS: positive for - gait disturbance, joint pain, joint stiffness, joint swelling, muscle pain and muscular weakness  Neurological ROS: positive for - gait disturbance, headaches, impaired coordination/balance, numbness/tingling, visual changes and weakness  Dermatological ROS: negative          Medications reviewed:  Current Outpatient Medications   Medication Sig Dispense Refill    zolpidem (Ambien) 10 mg tablet Take 2 Tablets by mouth nightly. Max Daily Amount: 20 mg. 60 Tablet 3    lisinopriL (PRINIVIL, ZESTRIL) 20 mg tablet TAKE 1 TABLET BY MOUTH EVERY DAY 90 Tablet 3    HYDROcodone-acetaminophen (NORCO)  mg tablet Take 2 Tablets by mouth every eight (8) hours as needed for Pain for up to 14 days. Max Daily Amount: 6 Tablets. This medication should last until 10/12/2021. I cannot do early refills.  90 Tablet 0    gabapentin (NEURONTIN) 800 mg tablet Take 1 Tablet by mouth three (3) times daily. Max Daily Amount: 2,400 mg. 90 Tablet 3    naloxone (NARCAN) 4 mg/actuation nasal spray Use 1 spray intranasally, then discard. Repeat with new spray every 2 min as needed for opioid overdose symptoms, alternating nostrils. 1 Each 1    DULoxetine (CYMBALTA) 60 mg capsule Take 1 Capsule by mouth daily for 90 days. 90 Capsule 0    Wixela Inhub 250-50 mcg/dose diskus inhaler 1 PUFF TWICE A DAY 1 Inhaler 5    amitriptyline (ELAVIL) 10 mg tablet Take 1 Tab by mouth nightly. 30 Tab 2    folic acid (FOLVITE) 1 mg tablet TAKE 1 TABLET BY MOUTH EVERY DAY 90 Tab 1    pyridoxine, vitamin B6, (VITAMIN B-6) 50 mg tablet TAKE 1 TABLET BY MOUTH EVERY DAY 90 Tab 2    docusate sodium (COLACE) 100 mg capsule TAKE 1 CAPSULE BY MOUTH TWICE A DAY FOR 90 DAYS      albuterol (PROVENTIL HFA, VENTOLIN HFA, PROAIR HFA) 90 mcg/actuation inhaler INHALE 1 PUFF BY MOUTH EVERY 4 HOURS AS NEEDED      ibuprofen (MOTRIN) 200 mg tablet Take 200 mg by mouth every six (6) hours as needed.  bisacodyL (DULCOLAX) 5 mg EC tablet Take 2 Tabs by mouth daily. 90 Tab 3    amLODIPine (NORVASC) 10 mg tablet Take 10 mg by mouth daily. Objective:   Vitals: There were no vitals filed for this visit.             Lab Data Reviewed:  Lab Results   Component Value Date/Time    WBC 5.6 09/07/2017 12:57 PM    HCT 36.6 09/07/2017 12:57 PM    HGB 11.4 (L) 09/07/2017 12:57 PM    PLATELET 646 79/88/8396 12:57 PM       Lab Results   Component Value Date/Time    Sodium 140 09/07/2017 12:57 PM    Potassium 4.0 09/07/2017 12:57 PM    Chloride 103 09/07/2017 12:57 PM    CO2 31 09/07/2017 12:57 PM    Glucose 117 (H) 09/07/2017 12:57 PM    BUN 16 09/07/2017 12:57 PM    Creatinine 0.85 09/07/2017 12:57 PM    Calcium 9.1 09/07/2017 12:57 PM       No components found for: TROPQUANT    No results found for: OWEN      No results found for: HBA1C, UAN0AOHD, ZSC6WMJT     Lab Results   Component Value Date/Time    Vitamin B12 >2000 (H) 04/08/2021 01:21 PM       No results found for: OWEN, ANARX, ANAIGG, XBANA    No results found for: CHOL, CHOLPOCT, CHOLX, CHLST, CHOLV, HDL, HDLPOC, HDLP, LDL, LDLCPOC, LDLC, DLDLP, VLDLC, VLDL, TGLX, TRIGL, TRIGP, TGLPOCT, CHHD, CHHDX      CT Results (recent):  Results from Hospital Encounter encounter on 01/23/19    CT LOW EXT LT WO CONT    Narrative  EXAM: CT LOW EXT LT WO CONT    INDICATION: Severe left knee pain. Left knee replacement on unspecified date. COMPARISON: [The views on 12/27/2016. TECHNIQUE: Helical CT of the left knee with three-dimensional, coronal and  sagittal reformats. Images reviewed in soft tissue and bone windows. CT dose  reduction was achieved through the use of a standardized protocol tailored for  this examination and automatic exposure control for dose modulation. CONTRAST: None. FINDINGS: Hardware: Metal artifact arises from the femoral and tibial components  of the knee prosthesis. Patellar component is radiolucent and difficult to  evaluate. No lucency around the components. Tibial component is partially  secured with cement. Bones: Mild osteopenia. No acute fracture. No suspicious bone lesion. Joint fluid: Small suprapatellar joint effusion. No evidence of radiolucent  loose body. Articulations: Tricompartment prosthesis. Tendons: Extensor mechanism is intact within the limitation of metal artifact. Muscles: Mild atrophy. Soft tissue mass: None. Stranding in the subcutaneous fat lateral to the distal  femur may represent soft tissue contusion. Impression  IMPRESSION:  1. Left knee prosthesis is in good position without evidence of fracture or  complication. 2. Nonspecific joint effusion. 3. Fat stranding lateral to the distal femur may represent soft tissue contusion  in the proper clinical setting. No drainable fluid collection.       MRI Results (recent):  Results from [place] encounter on 07/02/13    MRI SHOULDER LT WO CONT    Narrative  **Final Report**      ICD Codes / Adm. Diagnosis: 726.2  719.41 / Other affections of shoulder r  Pain in joint, shoulder shameka  Examination:  MR SHOULDER WO CON LT  - [id number] - Jul 2 2013  3:04PM  Accession No:  [id number]  Reason:  rct      REPORT:  EXAM:  MR SHOULDER RAPHAEL CARBAJAL LT    INDICATION: Severe osteoarthritis. COMPARISON: None    TECHNIQUE: Axial proton density fat-saturated; oblique coronal T1, T2  fat-saturated, and proton density fat-saturated; and oblique sagittal T2  fat-saturated MRI of the left shoulder. CONTRAST: None. FINDINGS: A.C. joint: Moderate osteoarthritis. Superior osteophytes. Anterior acromion process type: 2    Glenohumeral articular cartilage: Severe osteoarthritis. Bulky marginal  osteophytes. Extensive subchondral cyst formation. Flattening and volume  loss of the glenoid. Glenoid measures approximately 2.8 cm AP by 1.4 cm  transverse by 2.3 cm craniocaudal.    Bone marrow: No acute fracture or dislocation. Joint fluid: Small glenohumeral joint effusion contains debris and mild  synovitis. Largest loose body measures 4 mm in the posterior, inferior  glenohumeral joint space. Rotator cuff tendons: Irregular partial-thickness tears of the distal,  anterior supraspinatus tendon. Infraspinatus and teres minor tendons are  intact. Partial-thickness and intrasubstance tears of the distal  subscapularis tendon. Biceps tendon: Mild intra-articular biceps tendinosis. No full-thickness  tear or retraction. Muscles: Mild diffuse muscle atrophy. No focal atrophy or edema. Glenoid labrum: Degenerated. Joint capsule: within normal limits. Soft tissue mass: None. IMPRESSION:  1. Severe glenohumeral joint osteoarthritis. 2. Partial-thickness rotator cuff tears. No full-thickness tear or  retraction. 3. Mild diffuse muscle atrophy.           Signing/Reading Doctor: Phil Pyle ([id number])  Approved: Phil Pyle ([id number])  Jul 2 2013  3:55PM      IR Results (recent):  No results found for this or any previous visit. VAS/US Results (recent):  No results found for this or any previous visit. PHYSICAL EXAM:  General:    Alert, cooperative, no distress, appears stated age. Head:   Normocephalic, without obvious abnormality, atraumatic. Eyes:   Conjunctivae/corneas clear. Nose:  Nares normal. .  Throat:    Lips, and tongue normal.  No Thrush  Neck:  Supple, symmetrical,  no adenopathy, thyroid: non tender     no JVD. Back:    Symmetric,. Lungs:   Deferred  Chest wall:  No Accessory muscle use. Heart:   Deferred. Abdomen:   . Not distended. Extremities: Extremities normal, atraumatic, No cyanosis. No clubbing  Skin:      No rashes or lesions. Not Jaundiced  Lymph nodes: Cervical, supraclavicular normal.  Psych:  Good insight. Not depressed. Anxious. NEUROLOGICAL EXAM:  Appearance: The patient is well developed, well nourished, provides a coherent history and is in no acute distress. Mental Status: Oriented to time, place and person. Mood and affect appropriate. Cranial Nerves:   Intact visual fields. .  EOM's full, no nystagmus, no ptosis. Benedetta [employer] Hearing is normal bilaterally. Tongue is midline. Motor:   Moves all extremities. Normal bulk  No fasciculations. Reflexes:   Deferredl. Sensory:   Normal to touch, pinprick and vibration. Gait:   Not assessed   Tremor:    Mild tremor  noted. Cerebellar:  No cerebellar signs present. Assesment  1. Polyneuropathy  Continue Neurontin  2. Primary insomnia    - zolpidem (Ambien) 10 mg tablet; Take 2 Tablets by mouth nightly. Max Daily Amount: 20 mg. Dispense: 60 Tablet; Refill: 3    3. Chronic pain syndrome  Following pain management    4. Hereditary sensory/autonomic neuropathy (HSN/HSAN)  Neurontin  5. Myofascial muscle pain  Physical therapy  6. Fibromyalgia  Physical therapy    7. Gait disorder  Physical therapy    8.  PVD (peripheral vascular disease) ([facility] [address]  Following vascular    ___________________________________________________  PLAN: Medication and plan discussed with patient and daughter      ICD-10-CM ICD-9-CM    1. Polyneuropathy  G62.9 356.9    2. Primary insomnia  F51.01 307.42 zolpidem (Ambien) 10 mg tablet   3. Chronic pain syndrome  G89.4 338.4    4. Hereditary sensory/autonomic neuropathy (HSN/HSAN)  G60.8 356.2      356.0    5. Myofascial muscle pain  M79.18 729.1    6. Fibromyalgia  M79.7 729.1    7. Gait disorder  R26.9 781.2    8. PVD (peripheral vascular disease) ([facility])  I73.9 443.9      Follow-up and Dispositions    · Return in about 3 months (around 1/6/2022).              ___________________________________________________    Attending Physician: Wilton Briceño MD

## 2021-11-15 ENCOUNTER — VIRTUAL VISIT (OUTPATIENT)
Dept: FAMILY MEDICINE CLINIC | Age: 76
End: 2021-11-15
Payer: MEDICARE

## 2021-11-15 DIAGNOSIS — L03.116 CELLULITIS OF LEFT LOWER EXTREMITY: Primary | ICD-10-CM

## 2021-11-15 DIAGNOSIS — G62.89 OTHER POLYNEUROPATHY: ICD-10-CM

## 2021-11-15 PROCEDURE — G8417 CALC BMI ABV UP PARAM F/U: HCPCS | Performed by: NURSE PRACTITIONER

## 2021-11-15 PROCEDURE — 3288F FALL RISK ASSESSMENT DOCD: CPT | Performed by: NURSE PRACTITIONER

## 2021-11-15 PROCEDURE — 99214 OFFICE O/P EST MOD 30 MIN: CPT | Performed by: NURSE PRACTITIONER

## 2021-11-15 PROCEDURE — G8756 NO BP MEASURE DOC: HCPCS | Performed by: NURSE PRACTITIONER

## 2021-11-15 PROCEDURE — G8432 DEP SCR NOT DOC, RNG: HCPCS | Performed by: NURSE PRACTITIONER

## 2021-11-15 PROCEDURE — 1090F PRES/ABSN URINE INCON ASSESS: CPT | Performed by: NURSE PRACTITIONER

## 2021-11-15 PROCEDURE — G8536 NO DOC ELDER MAL SCRN: HCPCS | Performed by: NURSE PRACTITIONER

## 2021-11-15 PROCEDURE — 1100F PTFALLS ASSESS-DOCD GE2>/YR: CPT | Performed by: NURSE PRACTITIONER

## 2021-11-15 PROCEDURE — G8400 PT W/DXA NO RESULTS DOC: HCPCS | Performed by: NURSE PRACTITIONER

## 2021-11-15 PROCEDURE — G8427 DOCREV CUR MEDS BY ELIG CLIN: HCPCS | Performed by: NURSE PRACTITIONER

## 2021-11-15 RX ORDER — CEPHALEXIN 500 MG/1
500 CAPSULE ORAL 4 TIMES DAILY
Qty: 28 CAPSULE | Refills: 0 | Status: SHIPPED | OUTPATIENT
Start: 2021-11-15 | End: 2021-11-22

## 2021-11-15 RX ORDER — FLUTICASONE PROPIONATE AND SALMETEROL 250; 50 UG/1; UG/1
POWDER RESPIRATORY (INHALATION)
COMMUNITY

## 2021-11-15 RX ORDER — PREGABALIN 75 MG/1
75 CAPSULE ORAL 2 TIMES DAILY
Qty: 60 CAPSULE | Refills: 1 | Status: SHIPPED | OUTPATIENT
Start: 2021-11-15 | End: 2022-05-23

## 2021-11-15 RX ORDER — HYDROCODONE BITARTRATE AND ACETAMINOPHEN 10; 325 MG/1; MG/1
TABLET ORAL
COMMUNITY
End: 2022-08-05 | Stop reason: DRUGHIGH

## 2021-11-15 NOTE — PROGRESS NOTES
Cal Ayoub (: 1945) is a 68 y.o. female, established patient, here for evaluation of the following chief complaint(s):   Follow-up       ASSESSMENT/PLAN:  Below is the assessment and plan developed based on review of pertinent history, labs, studies, and medications. 1. Cellulitis of left lower extremity  -     cephALEXin (KEFLEX) 500 mg capsule; Take 1 Capsule by mouth four (4) times daily for 7 days. , Normal, Disp-28 Capsule, R-0  2. Other polyneuropathy  -     pregabalin (LYRICA) 75 mg capsule; Take 1 Capsule by mouth two (2) times a day. Max Daily Amount: 150 mg., Normal, Disp-60 Capsule, R-1Do not fill prior to 10/20/2021. Changing from gabapentin to Lyrica. Return in about 1 month (around 12/15/2021) for Follow up efficacy of medication(s), Follow-up chronic medical conditions. 1. Cellulitis of left lower extremity  We will try another course of Keflex since it was helpful for the bumps on her legs in recent past.  Patient knows to complete entire course of the medication even if the bumps improved. - cephALEXin (KEFLEX) 500 mg capsule; Take 1 Capsule by mouth four (4) times daily for 7 days. Dispense: 28 Capsule; Refill: 0    2. Other polyneuropathy  We will discontinue the gabapentin since it is no longer effective for patient's symptoms. She can start the Lyrica when she has completed the gabapentin doses for this month. - pregabalin (LYRICA) 75 mg capsule; Take 1 Capsule by mouth two (2) times a day. Max Daily Amount: 150 mg. Dispense: 60 Capsule; Refill: 1      SUBJECTIVE/OBJECTIVE:    HPI:    Patient presented with complaints of return of lesions on her lower left leg that she had several months ago. The lesions are also starting on her right lower leg. She described the lesions as bumps. No drainage or itching.   She was ordered a course of Keflex several months ago which was helpful when treating the lesions so can repeat the same course of antibiotics again. Patient has a very complicated medical history with many comorbidities to include chronic pain, hypertension, disuse muscle atrophy,  hereditary sensory and autonomic neuropathy, asthma, osteoarthritis of multiple joints, bilateral leg pain, insomnia, and partial tears of the left and right rotator cuffs. She also has anxiety and depression. She is very limited in her mobility and uses a scooter to get around. Patient has been going to water therapy at the [address] [place] [address] and soon her therapy will be ending. She can continue to go to the pool several times a week and do the therapy she has learned on her own. She enjoys the therapy sessions very much. Recently began seeing a new pain specialist who has taken over the prescribing of her opioid medication. She stated that the gabapentin she has been taking which is at maximum dose of 2400 mg daily is no longer helping the neuropathy symptoms. She is having symptoms in both feet and toes and also in her right arm which feels like electric shocks. She has never been on Lyrica. Patient also suffers from insomnia and has been taking zolpidem for years. Current dose is two 10 mg tablets at bedtime. She stated that her pharmacy told her that she has been overusing zolpidem because she is running out too soon. Patient vehemently denied overusing her sleep medicine so it is unclear why she is running out of medication sooner than expected. Review of Systems   Constitutional: Negative for chills and fever. HENT: Negative for congestion, ear pain, postnasal drip and sore throat. Respiratory: Negative for cough, shortness of breath and wheezing. Cardiovascular: Negative for chest pain and palpitations. Gastrointestinal: Negative for abdominal pain, diarrhea, nausea and vomiting. Genitourinary: Negative for dysuria and frequency. Musculoskeletal: Positive for arthralgias and myalgias. Shoulders, hands, R arm.    Skin: Negative for rash. Has bumps around her ankle- a lot on l ankle and some on R ankle. Neurological: Positive for weakness and numbness. Negative for dizziness, light-headedness and headaches. Psychiatric/Behavioral: Positive for dysphoric mood and sleep disturbance. The patient is nervous/anxious. No data recorded       Physical Exam    [INSTRUCTIONS:  \"[x]\" Indicates a positive item  \"[]\" Indicates a negative item  -- DELETE ALL ITEMS NOT EXAMINED]    Constitutional: [x] Appears well-developed and well-nourished [x] No apparent distress      [] Abnormal -     Mental status: [x] Alert and awake  [x] Oriented to person/place/time [x] Able to follow commands    [] Abnormal -     Eyes:   EOM    [x]  Normal    [] Abnormal -   Sclera  [x]  Normal    [] Abnormal -          Discharge [x]  None visible   [] Abnormal -     HENT: [x] Normocephalic, atraumatic  [] Abnormal -   [] Mouth/Throat: Mucous membranes are moist    External Ears [x] Normal  [] Abnormal -    Neck: [x] No visualized mass [] Abnormal -     Pulmonary/Chest: [x] Respiratory effort normal   [x] No visualized signs of difficulty breathing or respiratory distress        [] Abnormal -      Musculoskeletal:   [] Normal gait with no signs of ataxia         [x] Normal range of motion of neck        [] Abnormal -     Neurological:        [x] No Facial Asymmetry (Cranial nerve 7 motor function) (limited exam due to video visit)          [x] No gaze palsy        [] Abnormal -          Skin:        [x] No significant exanthematous lesions or discoloration noted on facial skin         [] Abnormal -            Psychiatric:       [x] Normal Affect [] Abnormal -        [x] No Hallucinations    Other pertinent observable physical exam findings:-      Stephanie Qiu, was evaluated through a synchronous (real-time) audio-video encounter. The patient (or guardian if applicable) is aware that this is a billable service.  Verbal consent to proceed has been obtained within the past 12 months. The visit was conducted pursuant to the emergency declaration under the [facility] [address] authority and the [employer] General Act. Patient identification was verified, and a caregiver was present when appropriate. The patient was located in a state where the provider was credentialed to provide care. An electronic signature was used to authenticate this note.   -- Alpesh Moffett NP   I  do attest that this note was reviewed and I was available for  ISHAAN ZHAO on this day of service and will follow along with ISHAAN Marks MD

## 2022-02-07 ENCOUNTER — OFFICE VISIT (OUTPATIENT)
Dept: PODIATRY | Age: 77
End: 2022-02-07
Payer: MEDICARE

## 2022-02-07 VITALS
TEMPERATURE: 97.1 F | WEIGHT: 270 LBS | SYSTOLIC BLOOD PRESSURE: 146 MMHG | BODY MASS INDEX: 37.8 KG/M2 | DIASTOLIC BLOOD PRESSURE: 68 MMHG | HEIGHT: 71 IN | HEART RATE: 59 BPM

## 2022-02-07 DIAGNOSIS — R23.4 FISSURE IN SKIN OF BOTH FEET: Primary | ICD-10-CM

## 2022-02-07 DIAGNOSIS — L60.3 ONYCHODYSTROPHY: ICD-10-CM

## 2022-02-07 PROCEDURE — G8427 DOCREV CUR MEDS BY ELIG CLIN: HCPCS | Performed by: PODIATRIST

## 2022-02-07 PROCEDURE — G8417 CALC BMI ABV UP PARAM F/U: HCPCS | Performed by: PODIATRIST

## 2022-02-07 PROCEDURE — G8432 DEP SCR NOT DOC, RNG: HCPCS | Performed by: PODIATRIST

## 2022-02-07 PROCEDURE — G8536 NO DOC ELDER MAL SCRN: HCPCS | Performed by: PODIATRIST

## 2022-02-07 PROCEDURE — G8400 PT W/DXA NO RESULTS DOC: HCPCS | Performed by: PODIATRIST

## 2022-02-07 PROCEDURE — 1101F PT FALLS ASSESS-DOCD LE1/YR: CPT | Performed by: PODIATRIST

## 2022-02-07 PROCEDURE — 1090F PRES/ABSN URINE INCON ASSESS: CPT | Performed by: PODIATRIST

## 2022-02-07 PROCEDURE — G8754 DIAS BP LESS 90: HCPCS | Performed by: PODIATRIST

## 2022-02-07 PROCEDURE — 99213 OFFICE O/P EST LOW 20 MIN: CPT | Performed by: PODIATRIST

## 2022-02-07 PROCEDURE — G8753 SYS BP > OR = 140: HCPCS | Performed by: PODIATRIST

## 2022-02-07 NOTE — PROGRESS NOTES
Chief Complaint   Patient presents with    Diabetic Foot Exam     states heel appears to be breaking down     1. Have you been to the ER, urgent care clinic since your last visit? Hospitalized since your last visit? No    2. Have you seen or consulted any other health care providers outside of the [address] [place] [address] since your last visit? Include any pap smears or colon screening.  No  PCP-Bhupendra Ogden Yes, Non-Core measure site...

## 2022-02-14 DIAGNOSIS — F51.01 PRIMARY INSOMNIA: ICD-10-CM

## 2022-02-14 RX ORDER — ZOLPIDEM TARTRATE 10 MG/1
20 TABLET ORAL
Qty: 60 TABLET | Refills: 3 | Status: SHIPPED | OUTPATIENT
Start: 2022-02-14 | End: 2022-06-20

## 2022-02-14 RX ORDER — AMMONIUM LACTATE 12 G/100G
CREAM TOPICAL 2 TIMES DAILY
Qty: 280 G | Refills: 3 | Status: SHIPPED | OUTPATIENT
Start: 2022-02-14 | End: 2022-06-08

## 2022-02-14 NOTE — PROGRESS NOTES
[facility]    Subjective:         Patient is a 68 y.o. female who is being seen as a returning pt for thick/discolored toenails and an acute complaint of bilateral heel pain. Patient states the bilateral heel pain rises to level 6 out of 10. She states any pressure weightbearing exacerbates the pain. She denies any overt trauma. She denies any changes in her activity level or shoe gear. She denies any systemic signs of infection. She denies any other pedal complaints    Past Medical History:   Diagnosis Date    Asthma      Past Surgical History:   Procedure Laterality Date    HX ORTHOPAEDIC         Family History   Problem Relation Age of Onset    Arthritis Mother     Heart defect Father     Diabetes Sister       Social History     Tobacco Use    Smoking status: Never Smoker    Smokeless tobacco: Never Used   Substance Use Topics    Alcohol use: No     Allergies   Allergen Reactions    Morphine Other (comments)     hallucinations    Morphine Anxiety     Prior to Admission medications    Medication Sig Start Date End Date Taking? Authorizing Provider   Marybel Dominick 250-50 mcg/dose diskus inhaler TAKE 1 PUFF BY MOUTH TWICE A DAY 12/16/21   Gallo Marinelli NP   fluticasone propion-salmeteroL (Wixela Inhub) 250-50 mcg/dose diskus inhaler Wixela Inhub 250 mcg-50 mcg/dose powder for inhalation    Provider, Historical   HYDROcodone-acetaminophen (NORCO)  mg tablet hydrocodone 10 mg-acetaminophen 325 mg tablet    Provider, Historical   ibuprofen (AdviL) 100 mg tablet Advil    Provider, Historical   pregabalin (LYRICA) 75 mg capsule Take 1 Capsule by mouth two (2) times a day. Max Daily Amount: 150 mg. 11/15/21   Gallo Marinelli NP   zolpidem (Ambien) 10 mg tablet Take 2 Tablets by mouth nightly.  Max Daily Amount: 20 mg. 10/6/21   Ra Knapp MD   lisinopriL (PRINIVIL, ZESTRIL) 20 mg tablet TAKE 1 TABLET BY MOUTH EVERY DAY 10/3/21   Gallo Marinelli NP   naloxone [facility]) 4 mg/actuation nasal spray Use 1 spray intranasally, then discard. Repeat with new spray every 2 min as needed for opioid overdose symptoms, alternating nostrils. 9/13/21   Gallo Marinelli NP   amitriptyline (ELAVIL) 10 mg tablet Take 1 Tab by mouth nightly. 4/30/21   Ra Knapp MD   folic acid (FOLVITE) 1 mg tablet TAKE 1 TABLET BY MOUTH EVERY DAY 4/18/21   Ra Knapp MD   pyridoxine, vitamin B6, (VITAMIN B-6) 50 mg tablet TAKE 1 TABLET BY MOUTH EVERY DAY 4/18/21   Ra Knapp MD   docusate sodium (COLACE) 100 mg capsule TAKE 1 CAPSULE BY MOUTH TWICE A DAY FOR 90 DAYS 3/18/21   Provider, Historical   albuterol (PROVENTIL HFA, VENTOLIN HFA, PROAIR HFA) 90 mcg/actuation inhaler INHALE 1 PUFF BY MOUTH EVERY 4 HOURS AS NEEDED 11/3/20   Provider, Historical   ibuprofen (MOTRIN) 200 mg tablet Take 200 mg by mouth every six (6) hours as needed. Provider, Historical   bisacodyL (DULCOLAX) 5 mg EC tablet Take 2 Tabs by mouth daily. 12/4/20   Gallo Marinelli NP   amLODIPine (NORVASC) 10 mg tablet Take 10 mg by mouth daily. 4/27/20   Taylor Rhodes MD       Review of Systems   Constitutional: Negative. HENT: Negative. Eyes: Negative. Respiratory: Negative. Cardiovascular: Negative. Gastrointestinal: Negative. Endocrine: Negative. Genitourinary: Negative. Musculoskeletal: Positive for arthralgias and myalgias. Skin: Negative. Allergic/Immunologic: Negative. Neurological: Positive for numbness. Hematological: Negative. Psychiatric/Behavioral: Positive for sleep disturbance. All other systems reviewed and are negative. Objective:     Visit Vitals  BP (!) 146/68 (BP 1 Location: Left upper arm, BP Patient Position: Sitting, BP Cuff Size: Large adult)   Pulse (!) 59   Temp 97.1 °F (36.2 °C) (Temporal)   Ht 5' 11\" (1.803 m)   Wt 270 lb (122.5 kg)   BMI 37.66 kg/m²       Physical Exam  Vitals reviewed. Constitutional:       Appearance: She is morbidly obese. Cardiovascular:      Pulses:           Dorsalis pedis pulses are 2+ on the right side and 2+ on the left side. Posterior tibial pulses are 2+ on the right side and 2+ on the left side. Pulmonary:      Effort: Pulmonary effort is normal.   Musculoskeletal:      Right lower leg: Edema present. Left lower leg: Edema present. Right foot: Decreased range of motion. No Charcot foot or foot drop. Left foot: Decreased range of motion. No Charcot foot or foot drop. Feet:      Right foot:      Protective Sensation: 10 sites tested. 10 sites sensed. Skin integrity: Fissure present. Toenail Condition: Right toenails are abnormally thick and long. Left foot:      Protective Sensation: 10 sites tested. 10 sites sensed. Skin integrity: Fissure present. Toenail Condition: Left toenails are abnormally thick and long. Lymphadenopathy:      Lower Body: No right inguinal adenopathy. No left inguinal adenopathy. Skin:     General: Skin is warm. Capillary Refill: Capillary refill takes 2 to 3 seconds. Neurological:      Mental Status: She is alert and oriented to person, place, and time. Psychiatric:         Mood and Affect: Mood and affect normal.         Behavior: Behavior is cooperative. Data Review: No results found for this or any previous visit (from the past 24 hour(s)). Impression:       ICD-10-CM ICD-9-CM    1. Fissure in skin of both feet  R23.4 709.8    2. Onychodystrophy  L60.3 703.8        Recommendation:     Patient seen and evaluated in the office  Discussed and educated patient regarding their current medical condition. Instructed patient to monitor their feet daily, be compliant with all medications and wear supportive shoe gear  Instructed patient to thin her toenails for symptomatically  A prescription was given for ammonium lactate 12% cream to be applied twice daily to all affected fissured skin        Seth Liu, JOSE, CWSP, [facility]  [address] [place] [address]  O: [phone]  F: [phone]  C: [phone]

## 2022-02-23 ENCOUNTER — TELEPHONE (OUTPATIENT)
Dept: NEUROLOGY | Age: 77
End: 2022-02-23

## 2022-02-23 NOTE — TELEPHONE ENCOUNTER
Ambien PA sent to [facility]. Yamil Diaz [id number]) - [phone]    APPROVED to 12/31/22 - Faxed fadi to [facility].     Your information has been submitted to [facility]. Adam will review the request and will issue a decision, typically within 3-7 days from your submission. You can check the updated outcome later by reopening this request.    If Adam has not responded in 3-7 days or if you have any questions about your ePA request, please contact Adam at [phone].

## 2022-02-28 ENCOUNTER — VIRTUAL VISIT (OUTPATIENT)
Dept: NEUROLOGY | Age: 77
End: 2022-02-28
Payer: MEDICARE

## 2022-02-28 DIAGNOSIS — G62.9 POLYNEUROPATHY: Primary | ICD-10-CM

## 2022-02-28 DIAGNOSIS — M79.7 FIBROMYALGIA: ICD-10-CM

## 2022-02-28 DIAGNOSIS — E66.01 SEVERE OBESITY (BMI 35.0-39.9) WITH COMORBIDITY (HCC): ICD-10-CM

## 2022-02-28 DIAGNOSIS — F51.01 PRIMARY INSOMNIA: ICD-10-CM

## 2022-02-28 DIAGNOSIS — M79.18 MYOFASCIAL MUSCLE PAIN: ICD-10-CM

## 2022-02-28 DIAGNOSIS — G89.4 CHRONIC PAIN SYNDROME: ICD-10-CM

## 2022-02-28 DIAGNOSIS — G60.8 HEREDITARY SENSORY/AUTONOMIC NEUROPATHY (HSN/HSAN): ICD-10-CM

## 2022-02-28 PROCEDURE — G8536 NO DOC ELDER MAL SCRN: HCPCS | Performed by: PSYCHIATRY & NEUROLOGY

## 2022-02-28 PROCEDURE — G8756 NO BP MEASURE DOC: HCPCS | Performed by: PSYCHIATRY & NEUROLOGY

## 2022-02-28 PROCEDURE — 1101F PT FALLS ASSESS-DOCD LE1/YR: CPT | Performed by: PSYCHIATRY & NEUROLOGY

## 2022-02-28 PROCEDURE — 1090F PRES/ABSN URINE INCON ASSESS: CPT | Performed by: PSYCHIATRY & NEUROLOGY

## 2022-02-28 PROCEDURE — 99214 OFFICE O/P EST MOD 30 MIN: CPT | Performed by: PSYCHIATRY & NEUROLOGY

## 2022-02-28 PROCEDURE — G8400 PT W/DXA NO RESULTS DOC: HCPCS | Performed by: PSYCHIATRY & NEUROLOGY

## 2022-02-28 PROCEDURE — G8417 CALC BMI ABV UP PARAM F/U: HCPCS | Performed by: PSYCHIATRY & NEUROLOGY

## 2022-02-28 PROCEDURE — G8427 DOCREV CUR MEDS BY ELIG CLIN: HCPCS | Performed by: PSYCHIATRY & NEUROLOGY

## 2022-02-28 PROCEDURE — G8432 DEP SCR NOT DOC, RNG: HCPCS | Performed by: PSYCHIATRY & NEUROLOGY

## 2022-02-28 RX ORDER — GABAPENTIN 800 MG/1
TABLET ORAL
COMMUNITY
Start: 2022-02-10

## 2022-02-28 NOTE — PROGRESS NOTES
Neurology Progress Note  Aubrey Coker was seen by synchronous (real-time) audio-video technology on 22. Consent:  She and/or her healthcare decision maker is aware that this patient-initiated Telehealth encounter is a billable service, with coverage as determined by her insurance carrier. She is aware that she may receive a bill and has provided verbal consent to proceed: Yes    I was in the office while conducting this encounter. Pursuant to the emergency declaration under the [facility] [place] [address] waiver authority and the [employer] Act, this Virtual  Visit was conducted, with patient's consent, to reduce the patient's risk of exposure to COVID-19 and provide continuity of care for an established patient. Services were provided through a video synchronous discussion virtually to substitute for in-person clinic visit. NAME:  Aubrey Coker   :   1945   MRN:   [id number]     Date/Time:  2022  Subjective:    Aubrey Coker is a 68 y.o. female here today for follow-up for neuropathy, gait disorder, chronic pain, sleep disorder-primary insomnia  Son was by the patient's side at the time of visit, and the daughter answers most of the questions  Patient continues to have a lot of pain mostly in the legs, also difficulty sleeping. Patient has  established with another pain management specialist, who is managing her pain while I am taking care of sleep medication on physical therapy. I however discussed with patient and told her that I could do the Ambien for now while she is looking for sleep medicine specialist.  She says she is still in physical therapy with aqua therapy and she is enjoying it. She has not had any falls since last visit. Patient said her pain is diffused, unrelenting, aggravated by activity. Patient is dependent on motorized wheelchair to ambulate.   Patient says she is having difficulty going to sleep and staying asleep, because of that, her physician was giving her Ambien 20 mg nightly which I now took over . Review of Systems - General ROS: positive for  - fatigue, night sweats, sleep disturbance and weight gain  Psychological ROS: positive for - anxiety and sleep disturbances  Ophthalmic ROS: positive for - blurry vision and decreased vision  ENT ROS: positive for - headaches, tinnitus and visual changes  Allergy and Immunology ROS: negative  Hematological and Lymphatic ROS: negative  Breast ROS: negative for breast lumps  Respiratory ROS: no cough, shortness of breath, or wheezing  Cardiovascular ROS: no chest pain or dyspnea on exertion  Gastrointestinal ROS: no abdominal pain, change in bowel habits, or black or bloody stools  Genito-Urinary ROS: no dysuria, trouble voiding, or hematuria  Musculoskeletal ROS: positive for - gait disturbance, joint pain, joint stiffness, joint swelling, muscle pain and muscular weakness  Neurological ROS: positive for - gait disturbance, headaches, impaired coordination/balance, numbness/tingling, visual changes and weakness  Dermatological ROS: negative    Medications reviewed:  Current Outpatient Medications   Medication Sig Dispense Refill    gabapentin (NEURONTIN) 800 mg tablet TAKE 1 TABLET BY MOUTH THREE (3) TIMES DAILY. MAX DAILY AMOUNT 2,400 MG.  ammonium lactate (AMLACTIN) 12 % topical cream Apply  to affected area two (2) times a day. rub in to affected area well 280 g 3    zolpidem (Ambien) 10 mg tablet Take 2 Tablets by mouth nightly.  Max Daily Amount: 20 mg. 60 Tablet 3    Wixela Inhub 250-50 mcg/dose diskus inhaler TAKE 1 PUFF BY MOUTH TWICE A  Each 5    fluticasone propion-salmeteroL (Wixela Inhub) 250-50 mcg/dose diskus inhaler Wixela Inhub 250 mcg-50 mcg/dose powder for inhalation      HYDROcodone-acetaminophen (NORCO)  mg tablet hydrocodone 10 mg-acetaminophen 325 mg tablet      ibuprofen (AdviL) 100 mg tablet Advil  pregabalin (LYRICA) 75 mg capsule Take 1 Capsule by mouth two (2) times a day. Max Daily Amount: 150 mg. 60 Capsule 1    lisinopriL (PRINIVIL, ZESTRIL) 20 mg tablet TAKE 1 TABLET BY MOUTH EVERY DAY 90 Tablet 3    naloxone (NARCAN) 4 mg/actuation nasal spray Use 1 spray intranasally, then discard. Repeat with new spray every 2 min as needed for opioid overdose symptoms, alternating nostrils. 1 Each 1    amitriptyline (ELAVIL) 10 mg tablet Take 1 Tab by mouth nightly. 30 Tab 2    folic acid (FOLVITE) 1 mg tablet TAKE 1 TABLET BY MOUTH EVERY DAY 90 Tab 1    pyridoxine, vitamin B6, (VITAMIN B-6) 50 mg tablet TAKE 1 TABLET BY MOUTH EVERY DAY 90 Tab 2    docusate sodium (COLACE) 100 mg capsule TAKE 1 CAPSULE BY MOUTH TWICE A DAY FOR 90 DAYS      albuterol (PROVENTIL HFA, VENTOLIN HFA, PROAIR HFA) 90 mcg/actuation inhaler INHALE 1 PUFF BY MOUTH EVERY 4 HOURS AS NEEDED      ibuprofen (MOTRIN) 200 mg tablet Take 200 mg by mouth every six (6) hours as needed.  bisacodyL (DULCOLAX) 5 mg EC tablet Take 2 Tabs by mouth daily. 90 Tab 3    amLODIPine (NORVASC) 10 mg tablet Take 10 mg by mouth daily. Objective:   Vitals: There were no vitals filed for this visit.             Lab Data Reviewed:  Lab Results   Component Value Date/Time    WBC 5.6 09/07/2017 12:57 PM    HCT 36.6 09/07/2017 12:57 PM    HGB 11.4 (L) 09/07/2017 12:57 PM    PLATELET 194 53/47/0763 12:57 PM       Lab Results   Component Value Date/Time    Sodium 140 09/07/2017 12:57 PM    Potassium 4.0 09/07/2017 12:57 PM    Chloride 103 09/07/2017 12:57 PM    CO2 31 09/07/2017 12:57 PM    Glucose 117 (H) 09/07/2017 12:57 PM    BUN 16 09/07/2017 12:57 PM    Creatinine 0.85 09/07/2017 12:57 PM    Calcium 9.1 09/07/2017 12:57 PM       No components found for: TROPQUANT    No results found for: OWEN      No results found for: HBA1C, AJJ4QRJW, AQS1WPXW     Lab Results   Component Value Date/Time    Vitamin B12 >2000 (H) 04/08/2021 01:21 PM       No results found for: OWEN, ANARX, ANAIGG, XBANA    No results found for: CHOL, CHOLPOCT, CHOLX, CHLST, CHOLV, HDL, HDLPOC, HDLP, LDL, LDLCPOC, LDLC, DLDLP, VLDLC, VLDL, TGLX, TRIGL, TRIGP, TGLPOCT, CHHD, CHHDX      CT Results (recent):  Results from Hospital Encounter encounter on 01/23/19    CT LOW EXT LT WO CONT    Narrative  EXAM: CT LOW EXT LT WO CONT    INDICATION: Severe left knee pain. Left knee replacement on unspecified date. COMPARISON: [The views on 12/27/2016. TECHNIQUE: Helical CT of the left knee with three-dimensional, coronal and  sagittal reformats. Images reviewed in soft tissue and bone windows. CT dose  reduction was achieved through the use of a standardized protocol tailored for  this examination and automatic exposure control for dose modulation. CONTRAST: None. FINDINGS: Hardware: Metal artifact arises from the femoral and tibial components  of the knee prosthesis. Patellar component is radiolucent and difficult to  evaluate. No lucency around the components. Tibial component is partially  secured with cement. Bones: Mild osteopenia. No acute fracture. No suspicious bone lesion. Joint fluid: Small suprapatellar joint effusion. No evidence of radiolucent  loose body. Articulations: Tricompartment prosthesis. Tendons: Extensor mechanism is intact within the limitation of metal artifact. Muscles: Mild atrophy. Soft tissue mass: None. Stranding in the subcutaneous fat lateral to the distal  femur may represent soft tissue contusion. Impression  IMPRESSION:  1. Left knee prosthesis is in good position without evidence of fracture or  complication. 2. Nonspecific joint effusion. 3. Fat stranding lateral to the distal femur may represent soft tissue contusion  in the proper clinical setting. No drainable fluid collection.       MRI Results (recent):  Results from [place] encounter on 07/02/13    MRI SHOULDER LT WO CONT    Narrative  **Final Report**      ICD Codes / Adm. Diagnosis: 726.2  719.41 / Other affections of shoulder r  Pain in joint, shoulder shameka  Examination:   SHOULDER WO CON LT  - [id number] - Jul 2 2013  3:04PM  Accession No:  [id number]  Reason:  rct      REPORT:  EXAM:   SHOULDER RAPHAEL CARBAJAL LT    INDICATION: Severe osteoarthritis. COMPARISON: None    TECHNIQUE: Axial proton density fat-saturated; oblique coronal T1, T2  fat-saturated, and proton density fat-saturated; and oblique sagittal T2  fat-saturated MRI of the left shoulder. CONTRAST: None. FINDINGS: A.C. joint: Moderate osteoarthritis. Superior osteophytes. Anterior acromion process type: 2    Glenohumeral articular cartilage: Severe osteoarthritis. Bulky marginal  osteophytes. Extensive subchondral cyst formation. Flattening and volume  loss of the glenoid. Glenoid measures approximately 2.8 cm AP by 1.4 cm  transverse by 2.3 cm craniocaudal.    Bone marrow: No acute fracture or dislocation. Joint fluid: Small glenohumeral joint effusion contains debris and mild  synovitis. Largest loose body measures 4 mm in the posterior, inferior  glenohumeral joint space. Rotator cuff tendons: Irregular partial-thickness tears of the distal,  anterior supraspinatus tendon. Infraspinatus and teres minor tendons are  intact. Partial-thickness and intrasubstance tears of the distal  subscapularis tendon. Biceps tendon: Mild intra-articular biceps tendinosis. No full-thickness  tear or retraction. Muscles: Mild diffuse muscle atrophy. No focal atrophy or edema. Glenoid labrum: Degenerated. Joint capsule: within normal limits. Soft tissue mass: None. IMPRESSION:  1. Severe glenohumeral joint osteoarthritis. 2. Partial-thickness rotator cuff tears. No full-thickness tear or  retraction. 3. Mild diffuse muscle atrophy.           Signing/Reading Doctor: Shelbi Guerrero ([id number])  Approved: Shelbi Goodness ([id number])  Jul 2 2013  3:55PM      IR Results (recent):  No results found for this or any previous visit. VAS/US Results (recent):  No results found for this or any previous visit. PHYSICAL EXAM:  General:    Alert, cooperative, no distress, appears stated age. Head:   Normocephalic, without obvious abnormality, atraumatic. Eyes:   Conjunctivae/corneas clear. Nose:  Nares normal. .  Throat:    Lips, and tongue normal.  No Thrush  Neck:  Supple, symmetrical,  no adenopathy, thyroid: non tender     no JVD. Back:    Symmetric,. Lungs:   Deferred  Chest wall:  No Accessory muscle use. Heart:   Deferred. Abdomen:   . Not distended. Extremities: Extremities normal, atraumatic, No cyanosis. No clubbing  Skin:      No rashes or lesions. Not Jaundiced  Lymph nodes: Cervical, supraclavicular normal.  Psych:  Good insight. Not depressed. Anxious. NEUROLOGICAL EXAM:  Appearance: The patient is well developed, well nourished, provides a coherent history and is in no acute distress. Mental Status: Oriented to time, place and person. Mood and affect appropriate. Cranial Nerves:   Intact visual fields. .  EOM's full, no nystagmus, no ptosis. Rina Rose Hearing is normal bilaterally. Tongue is midline. Motor:   Moves all extremities. Normal bulk  No fasciculations. Reflexes:   Deferredl. Sensory:   Normal to touch, pinprick and vibration. Gait:   Not assessed   Tremor:    Mild tremor  noted. Cerebellar:  No cerebellar signs present. Assesment  1. Polyneuropathy  Continue management with Neurontin    2. Severe obesity (BMI 35.0-39. 9) with comorbidity ([address]  Advised on weight loss and diet, continue aqua therapy    3. Primary insomnia  Continue Ambien    4. Chronic pain syndrome  Plan pain management    5. Hereditary sensory/autonomic neuropathy (HSN/HSAN)  Neurontin    6. Myofascial muscle pain  Intermittent physical therapy with aqua therapy    7.  Fibromyalgia  Continue management through pain specialists    ___________________________________________________  PLAN: Medication and plan discussed with patient      ICD-10-CM ICD-9-CM    1. Polyneuropathy  G62.9 356.9    2. Severe obesity (BMI 35.0-39. 9) with comorbidity ([address]  E66.01 278.01    3. Primary insomnia  F51.01 307.42    4. Chronic pain syndrome  G89.4 338.4    5. Hereditary sensory/autonomic neuropathy (HSN/HSAN)  G60.8 356.2      356.0    6. Myofascial muscle pain  M79.18 729.1    7. Fibromyalgia  M79.7 729.1      Follow-up and Dispositions    · Return in about 6 months (around 8/28/2022).                  ___________________________________________________    Attending Physician: Armando Kat MD

## 2022-03-18 PROBLEM — M79.604 BILATERAL LEG PAIN: Status: ACTIVE | Noted: 2020-12-13

## 2022-03-18 PROBLEM — M75.112 PARTIAL TEAR OF LEFT ROTATOR CUFF: Status: ACTIVE | Noted: 2021-04-21

## 2022-03-18 PROBLEM — K59.03 DRUG-INDUCED CONSTIPATION: Status: ACTIVE | Noted: 2020-12-13

## 2022-03-18 PROBLEM — R60.9 PERIPHERAL EDEMA: Status: ACTIVE | Noted: 2020-12-13

## 2022-03-18 PROBLEM — M12.9 ARTHRITIS/ARTHROPATHY OF MULTIPLE JOINTS: Status: ACTIVE | Noted: 2021-04-21

## 2022-03-18 PROBLEM — M79.605 BILATERAL LEG PAIN: Status: ACTIVE | Noted: 2020-12-13

## 2022-03-19 PROBLEM — R29.898 BILATERAL LEG WEAKNESS: Status: ACTIVE | Noted: 2020-12-13

## 2022-03-19 PROBLEM — G47.00 INSOMNIA: Status: ACTIVE | Noted: 2020-12-13

## 2022-03-19 PROBLEM — G60.8: Status: ACTIVE | Noted: 2021-09-06

## 2022-03-19 PROBLEM — I10 ESSENTIAL HYPERTENSION: Status: ACTIVE | Noted: 2020-12-13

## 2022-03-19 PROBLEM — M75.111 PARTIAL TEAR OF RIGHT ROTATOR CUFF: Status: ACTIVE | Noted: 2021-04-21

## 2022-03-19 PROBLEM — M62.50 DISUSE MUSCLE ATROPHY: Status: ACTIVE | Noted: 2021-04-21

## 2022-03-19 PROBLEM — M19.90 OSTEOARTHRITIS: Status: ACTIVE | Noted: 2020-12-13

## 2022-03-20 PROBLEM — E66.01 SEVERE OBESITY (HCC): Status: ACTIVE | Noted: 2020-12-04

## 2022-03-20 PROBLEM — J45.909 ASTHMA: Status: ACTIVE | Noted: 2020-12-13

## 2022-04-28 DIAGNOSIS — J45.909 ASTHMA, UNSPECIFIED ASTHMA SEVERITY, UNSPECIFIED WHETHER COMPLICATED, UNSPECIFIED WHETHER PERSISTENT: Primary | ICD-10-CM

## 2022-04-28 RX ORDER — ALBUTEROL SULFATE 90 UG/1
AEROSOL, METERED RESPIRATORY (INHALATION)
Qty: 18 G | Refills: 3 | Status: SHIPPED | OUTPATIENT
Start: 2022-04-28

## 2022-04-28 NOTE — TELEPHONE ENCOUNTER
----- Message from [facility] sent at 4/27/2022  5:50 PM EDT -----  Subject: Refill Request    QUESTIONS  Name of Medication? albuterol (PROVENTIL HFA, VENTOLIN HFA, PROAIR HFA) 90   mcg/actuation inhaler  Patient-reported dosage and instructions? as needed  How many days do you have left? 0  Preferred Pharmacy? [id number] Pharmacy phone number (if available)? [phone]  ---------------------------------------------------------------------------  --------------  CALL BACK INFO  What is the best way for the office to contact you? OK to leave message on   voicemail  Preferred Call Back Phone Number? [phone]  ---------------------------------------------------------------------------  --------------  SCRIPT ANSWERS  Relationship to Patient?  Self

## 2022-05-23 ENCOUNTER — VIRTUAL VISIT (OUTPATIENT)
Dept: FAMILY MEDICINE CLINIC | Age: 77
End: 2022-05-23
Payer: MEDICARE

## 2022-05-23 DIAGNOSIS — G89.4 CHRONIC PAIN DISORDER: ICD-10-CM

## 2022-05-23 DIAGNOSIS — I10 ESSENTIAL HYPERTENSION: ICD-10-CM

## 2022-05-23 DIAGNOSIS — I73.9 PVD (PERIPHERAL VASCULAR DISEASE) (HCC): Primary | ICD-10-CM

## 2022-05-23 PROBLEM — M47.816 OSTEOARTHRITIS OF LUMBAR SPINE: Status: ACTIVE | Noted: 2022-03-23

## 2022-05-23 PROCEDURE — 99443 PR PHYS/QHP TELEPHONE EVALUATION 21-30 MIN: CPT | Performed by: NURSE PRACTITIONER

## 2022-05-23 NOTE — PROGRESS NOTES
Ortega Pastor (: 1945) is a 68 y.o. female, established patient, here for evaluation of the following chief complaint(s):   Follow-up and Medication Refill       ASSESSMENT/PLAN:  Below is the assessment and plan developed based on review of pertinent history, labs, studies, and medications. 1. PVD (peripheral vascular disease) ([facility] [address]  Assessment & Plan:   monitored by specialist. No acute findings meriting change in the plan  2. Chronic pain disorder  3. Essential hypertension      Return for Pt has f/u appt in office on 2022. Order labs. Follow-up chronic medical conditions. 1. PVD (peripheral vascular disease) ([facility] [address]  At baseline. She will continue on current medical treatment plan. 2. Chronic pain disorder  Patient will continue under the care of her chronic pain specialist and has frequent regular follow-ups to monitor effects of her opioid pain medicine and to obtain UDS as indicated. Currently at baseline. 3. Essential hypertension  Status unclear at this time. Will encourage patient and family to check her BP at least several times a week with goal less than 130/80. May need to adjust her BP medications. SUBJECTIVE/OBJECTIVE:    HPI     Patient presented for telephone appointment today. This was not an ideal situation since she had c/o stinging right ankle pain, R knee pain, numbness in her right hand and in fingers. Patient stated that she thinks an old incision on her right ankle is \"opening up\" but she cannot really see it. No drainage. Her grandson is currently living with her and is helping her as needed. He assisted her during the appointment by repeating phrases the patient could not hear that I said. Patient has mobility issues and spends a lot of time in her scooter.     Patient has a complex medical history to include chronic pain disorder, hypertension, arthropathy of multiple joints, hereditary sensory and autonomic neuropathy, drug-induced constipation, PVD, insomnia, and peripheral edema. She is under the care of a chronic pain provider who is writing orders for gabapentin, and Norco.  There is another provider who is ordering her Ambien for her insomnia. She does have a current prescription for Narcan at home. She was under the care of of a physiatrist at [place] [address] for many years. Unfortunately, at the beginning of 2022 [facility] announced that there would be no physicians working in the outpatient clinic any longer so patient lost that provider. She was doing water rehab at the [address] [place] [address] and was discharged. However, she still goes to the pool twice a week and does the exercises in the pool which she enjoys very much. Her BPs during office visits have ranged between 164/ 6/68. No home BP numbers available so unclear what it has been running at home lately. Currently ordered lisinopril 20 mg daily and amlodipine 10 mg daily as her medication regime. Review of Systems   Constitutional: Negative for chills and fever. HENT: Negative for congestion, ear pain, postnasal drip, sinus pressure and sinus pain. Eyes: Negative for pain and itching. Respiratory: Negative for cough, shortness of breath and wheezing. Cardiovascular: Positive for leg swelling. Negative for chest pain and palpitations. Gastrointestinal: Negative for abdominal pain, diarrhea, nausea and vomiting. Genitourinary: Positive for frequency. Negative for dysuria. Overactive bladder   Musculoskeletal: Positive for arthralgias. Negative for myalgias. Shoulder,hands,back   Skin: Negative for rash. Neurological: Negative for dizziness, weakness, light-headedness, numbness and headaches. Psychiatric/Behavioral: Positive for sleep disturbance. Negative for dysphoric mood. The patient is nervous/anxious. Bebe Sequeira, was evaluated through a telephone encounter. Telephone time was: 30 minutes.     The patient (or guardian if applicable) is aware that this is a billable service, which includes applicable co-pays. Verbal consent to proceed has been obtained. The visit was conducted pursuant to the emergency declaration under the [facility] [place]note 2[place], [address] [place] [address] authority and the [employer] General Act. Patient identification was verified, and a caregiver was present when appropriate. The patient was located at home in a state where the provider was licensed to provide care. An electronic signature was used to authenticate this note.   -- Alexa Menendez NP     I  do attest that this note was reviewed and I was available for  ISHAAN ZHAO on this day of service and will follow along with ISHAAN Negron MD

## 2022-06-08 ENCOUNTER — OFFICE VISIT (OUTPATIENT)
Dept: FAMILY MEDICINE CLINIC | Age: 77
End: 2022-06-08
Payer: MEDICARE

## 2022-06-08 VITALS
TEMPERATURE: 97.7 F | BODY MASS INDEX: 34.45 KG/M2 | SYSTOLIC BLOOD PRESSURE: 142 MMHG | WEIGHT: 247 LBS | OXYGEN SATURATION: 97 % | HEART RATE: 74 BPM | DIASTOLIC BLOOD PRESSURE: 70 MMHG | RESPIRATION RATE: 20 BRPM

## 2022-06-08 DIAGNOSIS — L97.919 ULCERS OF BOTH LOWER LEGS (HCC): ICD-10-CM

## 2022-06-08 DIAGNOSIS — G62.89 OTHER POLYNEUROPATHY: ICD-10-CM

## 2022-06-08 DIAGNOSIS — M79.18 MYOFASCIAL PAIN: ICD-10-CM

## 2022-06-08 DIAGNOSIS — L97.929 ULCERS OF BOTH LOWER LEGS (HCC): ICD-10-CM

## 2022-06-08 DIAGNOSIS — Z00.00 MEDICARE ANNUAL WELLNESS VISIT, SUBSEQUENT: Primary | ICD-10-CM

## 2022-06-08 PROCEDURE — 1123F ACP DISCUSS/DSCN MKR DOCD: CPT | Performed by: NURSE PRACTITIONER

## 2022-06-08 PROCEDURE — G8753 SYS BP > OR = 140: HCPCS | Performed by: NURSE PRACTITIONER

## 2022-06-08 PROCEDURE — 1101F PT FALLS ASSESS-DOCD LE1/YR: CPT | Performed by: NURSE PRACTITIONER

## 2022-06-08 PROCEDURE — G8400 PT W/DXA NO RESULTS DOC: HCPCS | Performed by: NURSE PRACTITIONER

## 2022-06-08 PROCEDURE — G8754 DIAS BP LESS 90: HCPCS | Performed by: NURSE PRACTITIONER

## 2022-06-08 PROCEDURE — G8427 DOCREV CUR MEDS BY ELIG CLIN: HCPCS | Performed by: NURSE PRACTITIONER

## 2022-06-08 PROCEDURE — 1090F PRES/ABSN URINE INCON ASSESS: CPT | Performed by: NURSE PRACTITIONER

## 2022-06-08 PROCEDURE — G8417 CALC BMI ABV UP PARAM F/U: HCPCS | Performed by: NURSE PRACTITIONER

## 2022-06-08 PROCEDURE — G8536 NO DOC ELDER MAL SCRN: HCPCS | Performed by: NURSE PRACTITIONER

## 2022-06-08 PROCEDURE — 99214 OFFICE O/P EST MOD 30 MIN: CPT | Performed by: NURSE PRACTITIONER

## 2022-06-08 PROCEDURE — G8432 DEP SCR NOT DOC, RNG: HCPCS | Performed by: NURSE PRACTITIONER

## 2022-06-08 RX ORDER — SENNOSIDES 25 MG/1
TABLET, FILM COATED ORAL
Qty: 45 G | Refills: 0 | Status: SHIPPED | OUTPATIENT
Start: 2022-06-08

## 2022-06-08 RX ORDER — DULOXETIN HYDROCHLORIDE 30 MG/1
30 CAPSULE, DELAYED RELEASE ORAL DAILY
Qty: 90 CAPSULE | Refills: 1 | Status: SHIPPED | OUTPATIENT
Start: 2022-06-08 | End: 2022-07-20 | Stop reason: DRUGHIGH

## 2022-06-08 NOTE — PATIENT INSTRUCTIONS
Medicare Wellness Visit, Female     The best way to live healthy is to have a lifestyle where you eat a well-balanced diet, exercise regularly, limit alcohol use, and quit all forms of tobacco/nicotine, if applicable. Regular preventive services are another way to keep healthy. Preventive services (vaccines, screening tests, monitoring & exams) can help personalize your care plan, which helps you manage your own care. Screening tests can find health problems at the earliest stages, when they are easiest to treat. [facility] follows the current, evidence-based guidelines published by the [facility] ([facility]) when recommending preventive services for our patients. Because we follow these guidelines, sometimes recommendations change over time as research supports it. (For example, mammograms used to be recommended annually. Even though Medicare will still pay for an annual mammogram, the newer guidelines recommend a mammogram every two years for women of average risk). Of course, you and your doctor may decide to screen more often for some diseases, based on your risk and your co-morbidities (chronic disease you are already diagnosed with). Preventive services for you include:  - Medicare offers their members a free annual wellness visit, which is time for you and your primary care provider to discuss and plan for your preventive service needs. Take advantage of this benefit every year!  -All adults over the age of 72 should receive the recommended pneumonia vaccines. Current USPSTF guidelines recommend a series of two vaccines for the best pneumonia protection.   -All adults should have a flu vaccine yearly and a tetanus vaccine every 10 years.   -All adults age 48 and older should receive the shingles vaccines (series of two vaccines).       -All adults age 38-68 who are overweight should have a diabetes screening test once every three years.   -All adults born between [birth year] and 1965 should be screened once for Hepatitis C.  -Other screening tests and preventive services for persons with diabetes include: an eye exam to screen for diabetic retinopathy, a kidney function test, a foot exam, and stricter control over your cholesterol.   -Cardiovascular screening for adults with routine risk involves an electrocardiogram (ECG) at intervals determined by your doctor.   -Colorectal cancer screenings should be done for adults age 54-65 with no increased risk factors for colorectal cancer. There are a number of acceptable methods of screening for this type of cancer. Each test has its own benefits and drawbacks. Discuss with your doctor what is most appropriate for you during your annual wellness visit. The different tests include: colonoscopy (considered the best screening method), a fecal occult blood test, a fecal DNA test, and sigmoidoscopy.    -A bone mass density test is recommended when a woman turns 65 to screen for osteoporosis. This test is only recommended one time, as a screening. Some providers will use this same test as a disease monitoring tool if you already have osteoporosis. -Breast cancer screenings are recommended every other year for women of normal risk, age 54-69.  -Cervical cancer screenings for women over age 72 are only recommended with certain risk factors.      Here is a list of your current Health Maintenance items (your personalized list of preventive services) with a due date:  Health Maintenance Due   Topic Date Due    Hepatitis C Test  Never done    Pneumococcal Vaccine (1 - PCV) Never done    DTaP/Tdap/Td  (1 - Tdap) Never done    Bone Mineral Density   Never done    Shingles Vaccine (2 of 2) 12/12/2019    COVID-19 Vaccine (3 - Booster) 12/01/2020

## 2022-06-08 NOTE — PROGRESS NOTES
Chief Complaint   Patient presents with    Annual Wellness Visit    Insomnia     states her Elisabeth Messer helps her fall asleep but not stay asleep         Visit Vitals  BP (!) 142/70 (BP 1 Location: Left upper arm, BP Patient Position: Sitting, BP Cuff Size: Adult)   Pulse 74   Temp 97.7 °F (36.5 °C) (Temporal)   Resp 20   Wt 247 lb (112 kg)   SpO2 97%   BMI 34.45 kg/m²       1. \"Have you been to the ER, urgent care clinic since your last visit? Hospitalized since your last visit? \" No    2. \"Have you seen or consulted any other health care providers outside of the [address] [place] [address] since your last visit? \"  [facility]      3. For patients aged 39-70: Has the patient had a colonoscopy / FIT/ Cologuard? NA - based on age      If the patient is female:    4. For patients aged 41-77: Has the patient had a mammogram within the past 2 years? NA - based on age or sex      11. For patients aged 21-65: Has the patient had a pap smear? NA - based on age or sex      This is the Subsequent Medicare Annual Wellness Exam, performed 12 months or more after the Initial AWV or the last Subsequent AWV    I have reviewed the patient's medical history in detail and updated the computerized patient record. Assessment/Plan   Education and counseling provided:  Are appropriate based on today's review and evaluation    1. Medicare annual wellness visit, subsequent  2. Myofascial pain  -     lidocaine 5 % topical cream; Use a thin layer to affected areas 2 x day., Normal, Disp-45 g, R-0  3. Other polyneuropathy  4.  Ulcers of both lower legs ([facility] [address]  -     REFERRAL TO WOUND CARE       Depression Risk Factor Screening     3 most recent PHQ Screens 7/20/2022   Little interest or pleasure in doing things Several days   Feeling down, depressed, irritable, or hopeless Several days   Total Score PHQ 2 2   Trouble falling or staying asleep, or sleeping too much -   Feeling tired or having little energy -   Poor appetite, weight loss, or overeating -   Feeling bad about yourself - or that you are a failure or have let yourself or your family down -   Trouble concentrating on things such as school, work, reading, or watching TV -   Moving or speaking so slowly that other people could have noticed; or the opposite being so fidgety that others notice -   Thoughts of being better off dead, or hurting yourself in some way -   PHQ 9 Score -   How difficult have these problems made it for you to do your work, take care of your home and get along with others -       Alcohol & Drug Abuse Risk Screen    Do you average more than 1 drink per night or more than 7 drinks a week:  No    On any one occasion in the past three months have you have had more than 3 drinks containing alcohol:  No          Functional Ability and Level of Safety    Hearing: The patient needs further evaluation. Activities of Daily Living: The home contains: no safety equipment. Patient needs help with:  transportation, shopping, preparing meals, housework, managing medications, eating, dressing, bathing, hygiene, bathroom needs and walking      Ambulation: wheelchair bound     Fall Risk:  Fall Risk Assessment, last 12 mths 6/8/2022   Able to walk? Yes   Fall in past 12 months? 1   Do you feel unsteady? 1   Are you worried about falling 1   Is TUG test greater than 12 seconds? 0   Is the gait abnormal? 1   Number of falls in past 12 months 2   Fall with injury?  0      Abuse Screen:  Patient is not abused       Cognitive Screening    Has your family/caregiver stated any concerns about your memory: no     Cognitive Screening: Normal - Mini Cog Test    Health Maintenance Due     Health Maintenance Due   Topic Date Due    Hepatitis C Screening  Never done    Pneumococcal 65+ years (1 - PCV) Never done    DTaP/Tdap/Td series (1 - Tdap) Never done    Bone Densitometry (Dexa) Screening  Never done    Shingrix Vaccine Age 50> (2 of 2) 12/12/2019    COVID-19 Vaccine (3 - Booster) 12/01/2020       Patient Care Team   Patient Care Team:  Davidson Blue NP as PCP - General (Nurse Practitioner)  Davidson Blue NP as PCP - [facility]    History     Patient Active Problem List   Diagnosis Code    Severe obesity ([facility] [address] E66.01    Bilateral leg pain M79.604, M79.605    Bilateral leg weakness R29.898    Chronic pain disorder G89.4    Essential hypertension I10    Osteoarthritis of multiple joints M15.9    Drug-induced constipation K59.03    Insomnia G47.00    Asthma J45.909    Peripheral edema R60.9    Partial tear of left rotator cuff M75.112    Partial tear of right rotator cuff M75.111    Disuse muscle atrophy M62.50    Arthritis/arthropathy of multiple joints M12.9    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    PVD (peripheral vascular disease) ([facility] [address] I73.9    Myofascial pain M79.18    Venous stasis ulcers of both lower extremities ([facility] [address] I83.019, I83.029, L97.919, L97.929    Peripheral neuropathy G62.9    H/O noncompliance with medical treatment, presenting hazards to health Z91.19    Cervical radiculopathy M54.12    Lumbosacral radiculopathy M54.17     Past Medical History:   Diagnosis Date    Asthma       Past Surgical History:   Procedure Laterality Date    HX ORTHOPAEDIC       Current Outpatient Medications   Medication Sig Dispense Refill    zolpidem (AMBIEN) 10 mg tablet Take 1 Tablet by mouth nightly as needed. HYDROcodone-acetaminophen (NORCO)  mg tablet Take 5.5 Tablets in the morning. Take 2 tabs by mouth in am, 1.5 tabs in afternoon, and 2 tabs at bedtime. lidocaine 5 % topical cream Use a thin layer to affected areas 2 x day. 45 g 0    albuterol (PROVENTIL HFA, VENTOLIN HFA, PROAIR HFA) 90 mcg/actuation inhaler INHALE 1 PUFF BY MOUTH EVERY 4-6 HOURS AS NEEDED. 18 g 3    gabapentin (NEURONTIN) 800 mg tablet TAKE 1 TABLET BY MOUTH THREE (3) TIMES DAILY. MAX DAILY AMOUNT 2,400 MG.       Etelvina Matar 250-50 mcg/dose diskus inhaler TAKE 1 PUFF BY MOUTH TWICE A  Each 5    fluticasone propion-salmeteroL (ADVAIR/WIXELA) 250-50 mcg/dose diskus inhaler Wixela Inhub 250 mcg-50 mcg/dose powder for inhalation      naloxone (NARCAN) 4 mg/actuation nasal spray Use 1 spray intranasally, then discard. Repeat with new spray every 2 min as needed for opioid overdose symptoms, alternating nostrils. 1 Each 1    folic acid (FOLVITE) 1 mg tablet TAKE 1 TABLET BY MOUTH EVERY DAY 90 Tab 1    pyridoxine, vitamin B6, (VITAMIN B-6) 50 mg tablet TAKE 1 TABLET BY MOUTH EVERY DAY 90 Tab 2    ibuprofen (MOTRIN) 200 mg tablet Take 200 mg by mouth every six (6) hours as needed. bisacodyL (DULCOLAX) 5 mg EC tablet Take 2 Tabs by mouth daily. 90 Tab 3    amLODIPine (NORVASC) 10 mg tablet Take 10 mg by mouth daily. DULoxetine (CYMBALTA) 60 mg capsule Take 1 Capsule by mouth in the morning. Indications: neuropathic pain 90 Capsule 1    docusate sodium (COLACE) 100 mg capsule TAKE 1 CAPSULE BY MOUTH TWICE A DAY FOR 90 DAYS 180 Capsule 3    zolpidem (AMBIEN) 10 mg tablet TAKE 2 TABLETS BY MOUTH NIGHTLY.  MAX DAILY AMOUNT: 20 MG. 60 Tablet 3    lisinopriL (PRINIVIL, ZESTRIL) 20 mg tablet TAKE 1 TABLET BY MOUTH EVERY DAY (Patient not taking: Reported on 7/20/2022) 90 Tablet 3     Allergies   Allergen Reactions    Morphine Other (comments)     hallucinations    Morphine Anxiety       Family History   Problem Relation Age of Onset    Arthritis Mother     Heart defect Father     Diabetes Sister      Social History     Tobacco Use    Smoking status: Never    Smokeless tobacco: Never   Substance Use Topics    Alcohol use: No         Cameron Rios LPN

## 2022-06-08 NOTE — PROGRESS NOTES
Chief Complaint   Patient presents with    Annual Wellness Visit    Insomnia     states her Artie Cadet helps her fall asleep but not stay asleep     Visit Vitals  BP (!) 142/70 (BP 1 Location: Left upper arm, BP Patient Position: Sitting, BP Cuff Size: Adult)   Pulse 74   Temp 97.7 °F (36.5 °C) (Temporal)   Resp 20   Wt 247 lb (112 kg)   SpO2 97%   BMI 34.45 kg/m²     Rolf Johnson is a 68 y.o. female. HPI;  Pt presented for annual Medicare Wellness visit and follow up. Accompanied by her daughter. Patient has complex medical history with many comorbidities to include chronic pain disorder, peripheral vascular disease, hypertension, osteoarthritis, insomnia, asthma, peripheral edema, hereditary autonomic neuropathy. Patient very upset today about the status of her scooters. She always comes to appointments in her electric scooter and she stated she has 2 others at home that function poorly. Her current chair is slanted and she always feels like she is falling out of the chair. Because of her weight the chair's  function to go up and down to help her get out of the chair easier will not work. Patient has osteoarthritis of multiple joints and cervical and lumbar sacral neuropathy. She has chronic pain syndrome. She is under the care of Gm Joyce who is managing her opioid prescription. She is quite upset because the pain center is attempting a very slow taper down of her pain medicines. Her new Norco  mg tab dosing schedule is 2 tabs in the a.m., 1.5 tabs in the afternoon, and 2 tabs at bedtime. .  She does not feel that this is adequate pain control. She is also taking gabapentin 800 mg 1 tab 3 times a day which is being ordered by her neurologist.  Patient has peripheral vascular disease and chronic venous stasis ulcers of both legs. She has chronic edema in both legs. She has now developed small open areas on the legs.   She has been under the care of vascular surgeon Dr. Mariah Hua but apparently has refused ultrasounds which would help with treatment plan. She also does follow up w/ Dr Monica Nascimento @ [address].     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    PVD (peripheral vascular disease) ([facility]) I73.9    Myofascial pain M79.18    Venous stasis ulcers of both lower extremities ([facility]) I83.019, I83.029, L97.919, L97.929    Peripheral neuropathy G62.9    H/O noncompliance with medical treatment, presenting hazards to health Z91.19    Cervical radiculopathy M54.12    Lumbosacral radiculopathy M54.17     Past Medical History:   Diagnosis Date    Asthma      Past Surgical History:   Procedure Laterality Date    HX ORTHOPAEDIC       Current Outpatient Medications   Medication Instructions    albuterol (PROVENTIL HFA, VENTOLIN HFA, PROAIR HFA) 90 mcg/actuation inhaler INHALE 1 PUFF BY MOUTH EVERY 4-6 HOURS AS NEEDED.    amLODIPine (NORVASC) 10 mg, Oral, DAILY    bisacodyL (DULCOLAX) 10 mg, Oral, DAILY    docusate sodium (COLACE) 100 mg capsule TAKE 1 CAPSULE BY MOUTH TWICE A DAY FOR 90 DAYS    DULoxetine (CYMBALTA) 60 mg, Oral, DAILY    fluticasone propion-salmeteroL (ADVAIR/WIXELA) 250-50 mcg/dose diskus inhaler Wixela Inhub 250 mcg-50 mcg/dose powder for inhalation    folic acid (FOLVITE) 1 mg tablet TAKE 1 TABLET BY MOUTH EVERY DAY    gabapentin (NEURONTIN) 800 mg tablet TAKE 1 TABLET BY MOUTH THREE (3) TIMES DAILY. MAX DAILY AMOUNT 2,400 MG. HYDROcodone-acetaminophen (NORCO)  mg tablet hydrocodone 10 mg-acetaminophen 325 mg tablet    ibuprofen (MOTRIN) 200 mg, Oral, EVERY 6 HOURS AS NEEDED    lidocaine 5 % topical cream Use a thin layer to affected areas 2 x day. lisinopriL (PRINIVIL, ZESTRIL) 20 mg tablet TAKE 1 TABLET BY MOUTH EVERY DAY    naloxone (NARCAN) 4 mg/actuation nasal spray Use 1 spray intranasally, then discard. Repeat with new spray every 2 min as needed for opioid overdose symptoms, alternating nostrils. pyridoxine, vitamin B6, (VITAMIN B-6) 50 mg tablet TAKE 1 TABLET BY MOUTH EVERY DAY    Wixela Inhub 250-50 mcg/dose diskus inhaler TAKE 1 PUFF BY MOUTH TWICE A DAY    zolpidem (AMBIEN) 20 mg, Oral, EVERY BEDTIME     Allergies   Allergen Reactions    Morphine Other (comments)     hallucinations    Morphine Anxiety     Social History     Tobacco Use    Smoking status: Never    Smokeless tobacco: Never   Vaping Use    Vaping Use: Never used   Substance Use Topics    Alcohol use: No    Drug use: No     Family History   Problem Relation Age of Onset    Arthritis Mother     Heart defect Father     Diabetes Sister        Review of Systems   Constitutional:  Negative for chills, fever and malaise/fatigue. HENT:  Negative for congestion, ear pain and sore throat. Respiratory:  Positive for shortness of breath. Negative for cough and wheezing. Cardiovascular:  Positive for leg swelling. Negative for chest pain and palpitations. Gastrointestinal:  Positive for constipation. Negative for abdominal pain, diarrhea, heartburn, nausea and vomiting. Genitourinary:  Positive for frequency. Musculoskeletal:  Positive for back pain and joint pain. Negative for falls, myalgias and neck pain. Neurological:  Positive for tingling, sensory change and weakness. Negative for dizziness and headaches. Psychiatric/Behavioral:  Positive for depression.  The patient is nervous/anxious and has insomnia. Objective  Physical Exam  Constitutional:       General: She is not in acute distress. Appearance: Normal appearance. She is obese. HENT:      Head: Normocephalic. Right Ear: External ear normal.      Left Ear: External ear normal.      Nose: Nose normal.   Eyes:      Conjunctiva/sclera: Conjunctivae normal.   Neck:      Vascular: No carotid bruit. Cardiovascular:      Rate and Rhythm: Normal rate and regular rhythm. Heart sounds: Normal heart sounds. No murmur heard. Pulmonary:      Effort: Pulmonary effort is normal. No respiratory distress. Breath sounds: Normal breath sounds. Musculoskeletal:         General: No swelling or tenderness. Normal range of motion. Cervical back: Neck supple. Right lower leg: No edema. Left lower leg: No edema. Skin:     General: Skin is warm and dry. Coloration: Skin is not jaundiced. Findings: Lesion present. No rash. Comments: Many small lesions and hard elevations on lower shins and around ankle areas on both legs. Skin covered w/ Vaseline so hard to assess if drainage. No odor, hard to assess erythema due to skin color. Tender to palpation. Neurological:      Mental Status: She is alert and oriented to person, place, and time. Motor: No weakness. Gait: Gait normal.   Psychiatric:         Mood and Affect: Mood normal.         Behavior: Behavior normal.         Thought Content: Thought content normal.         Judgment: Judgment normal.     Assessment & Plan      ICD-10-CM ICD-9-CM    1. Medicare annual wellness visit, subsequent  Z00.00 V70.0       2. Myofascial pain  M79.18 729.1 lidocaine 5 % topical cream      3. Other polyneuropathy  G62.89 357.89 DISCONTINUED: DULoxetine (CYMBALTA) 30 mg capsule      4. Ulcers of both lower legs ([facility])  L97.919 707.10 REFERRAL TO WOUND CARE    L97.929            1.  Medicare annual wellness visit, subsequent  Encouraged to continue annual [address] for health maintenance, preventive health updates and immunizations as needed. 2. Myofascial pain    - lidocaine 5 % topical cream; Use a thin layer to affected areas 2 x day. Dispense: 45 g; Refill: 0    3. Other polyneuropathy  Pt was agreeable to trying duloxetine as and adjunct to her pain control. Will monitor efficacy. 4. Ulcers of both lower legs ([address]  Discussed referral to wound care. Pt and daughter were agreeable. Advised to call for an appt asap. - REFERRAL TO WOUND CARE     I have discussed the diagnosis with the patient and the intended plan as seen in the above orders. Pt/caretaker has expressed understanding. Questions were answered concerning future plans. I have discussed medication side effects and warnings as indicated with the patient as well.     Janna Pinto NP

## 2022-06-13 NOTE — ED PROVIDER NOTES
68 y.o. female with past medical history significant for HTN, asthma, arthritis, and chronic left knee pain who presents to the ED with chief complaint of left knee pain. Pt reports she is S/P left knee replacement years ago and has had chronic left knee pain since that has recently worsened since she ran out of her gabapentin. Pt denies any recent injury. Pt states she is supposed to take antihypertensive medication but is not currently because she \"thinks it is making her hair fall out. \" There are no other acute medical complaints voiced at this time. Social Hx: Never smoker. Denies EtOH use. PCP: Don Holder DO Note written by Magdiel Solano, as dictated by Amalia Martinez MD 3:02 PM  
 
 
The history is provided by the patient. Past Medical History:  
Diagnosis Date  Arthritis  Asthma  Hypertension Past Surgical History:  
Procedure Laterality Date  HX ORTHOPAEDIC No family history on file. Social History Socioeconomic History  Marital status: SINGLE Spouse name: Not on file  Number of children: Not on file  Years of education: Not on file  Highest education level: Not on file Social Needs  Financial resource strain: Not on file  Food insecurity - worry: Not on file  Food insecurity - inability: Not on file  Transportation needs - medical: Not on file  Transportation needs - non-medical: Not on file Occupational History  Not on file Tobacco Use  Smoking status: Never Smoker Substance and Sexual Activity  Alcohol use: No  
 Drug use: No  
 Sexual activity: Not on file Other Topics Concern  Not on file Social History Narrative  Not on file ALLERGIES: Morphine Review of Systems Constitutional: Negative for activity change, diaphoresis, fatigue and fever. HENT: Negative for congestion and sore throat. Eyes: Negative for photophobia and visual disturbance. Respiratory: Negative for chest tightness and shortness of breath. Cardiovascular: Negative for chest pain, palpitations and leg swelling. Gastrointestinal: Negative for abdominal pain, blood in stool, constipation, diarrhea, nausea and vomiting. Genitourinary: Negative for difficulty urinating, dysuria, flank pain, frequency and hematuria. Musculoskeletal: Negative for back pain. +left knee pain Neurological: Negative for dizziness, syncope, numbness and headaches. All other systems reviewed and are negative. Vitals:  
 01/08/19 1502 BP: (!) 217/108 Pulse: 79 Resp: 16 Temp: 97.7 °F (36.5 °C) SpO2: 96% Weight: 127 kg (280 lb) Height: 5' 11\" (1.803 m) Physical Exam  
Constitutional: She is oriented to person, place, and time. She appears well-developed and well-nourished. No distress. Morbidly obese. HENT:  
Head: Normocephalic and atraumatic. Nose: Nose normal.  
Mouth/Throat: Oropharynx is clear and moist. No oropharyngeal exudate. Eyes: Conjunctivae and EOM are normal. Right eye exhibits no discharge. Left eye exhibits no discharge. No scleral icterus. Neck: Normal range of motion. Neck supple. No JVD present. No tracheal deviation present. No thyromegaly present. Cardiovascular: Normal rate, regular rhythm, normal heart sounds and intact distal pulses. Exam reveals no gallop and no friction rub. No murmur heard. Pulmonary/Chest: Effort normal and breath sounds normal. No stridor. No respiratory distress. She has no wheezes. She has no rales. She exhibits no tenderness. Abdominal: Bowel sounds are normal. She exhibits no distension and no mass. There is no tenderness. There is no rebound. Musculoskeletal: Normal range of motion. She exhibits no edema or tenderness. Lymphadenopathy:  
  She has no cervical adenopathy. Neurological: She is alert and oriented to person, place, and time. No cranial nerve deficit.  Coordination normal.  
 Skin: Skin is warm and dry. No rash noted. She is not diaphoretic. No erythema. No pallor. Psychiatric: She has a normal mood and affect. Her behavior is normal. Judgment and thought content normal.  
Nursing note and vitals reviewed. Note written by Magdiel Mcmillan, as dictated by Cheryl Dance, MD 3:03 PM 
 
MDM Number of Diagnoses or Management Options Medication refill:  
Diagnosis management comments: A/P:  Medication refill, refill gabapentin for 7 days. Procedures 12:17 AM 
The patient has been reevaluated. The patient is ready for discharge. The patient's signs, symptoms, diagnosis, and discharge instructions have been discussed and the patient/ family has conveyed their understanding. The patient is to follow up as recommended or return to the ED should their symptoms worsen. Plan has been discussed and the patient is in agreement. LABORATORY TESTS: 
No results found for this or any previous visit (from the past 12 hour(s)). IMAGING RESULTS: 
No orders to display No results found. MEDICATIONS GIVEN: 
Medications  
gabapentin (NEURONTIN) capsule 600 mg (600 mg Oral Given 1/8/19 1541) hydrALAZINE (APRESOLINE) tablet 25 mg (25 mg Oral Given 1/8/19 1541) IMPRESSION: 
1. Medication refill PLAN: 
1. Discharge Medication List as of 1/8/2019  4:55 PM  
  
START taking these medications Details  
gabapentin (NEURONTIN) 600 mg tablet Take 1 Tab by mouth three (3) times daily for 7 days. , Print, Disp-21 Tab, R-0  
  
  
CONTINUE these medications which have NOT CHANGED Details NIFEdipine (PROCARDIA) 10 mg capsule Take 10 mg by mouth three (3) times daily. , Historical Med  
  
ibuprofen (MOTRIN) 400 mg tablet Take  by mouth every six (6) hours as needed for Pain., Historical Med  
  
naproxen (NAPROSYN) 500 mg tablet Take 1 Tab by mouth every twelve (12) hours as needed for Pain., Print, Disp-20 Tab, R-0  
  
 HYDROcodone-acetaminophen (NORCO) 5-325 mg per tablet Take 1 Tab by mouth every four (4) hours as needed for Pain. Max Daily Amount: 6 Tabs., Print, Disp-20 Tab, R-0  
  
!! gabapentin (NEURONTIN) 300 mg capsule Take 1 Cap by mouth nightly. , Print, Disp-20 Cap, R-0  
  
LOSARTAN PO Take  by mouth two (2) times a day. Historical Med PHENYLEPHRINE/HYDROCODONE/CP (HYDROCODONE CP PO) Take  by mouth. Historical Med  
  
!! gabapentin (NEURONTIN) 300 mg capsule Take 1 Cap by mouth nightly. Print, 300 mg, Disp-30 Cap, R-0  
  
zolpidem (AMBIEN) 10 mg tablet Take 10 mg by mouth nightly as needed. Historical Med, 10 mg  
  
 !! - Potential duplicate medications found. Please discuss with provider. 2.  
Follow-up Information Follow up With Specialties Details Why Contact Jose Alston, DO Family Practice  If symptoms worsen [address] [facility] [id number] 
[phone] [facility] EMERGENCY DEPT Emergency Medicine  If symptoms worsen [address] 
[phone] Return to ED for new or worsening symptoms Carter Andrew MD 
 
 
  
 
 
 
 
 
 
 
 Consent (Scalp)/Introductory Paragraph: The rationale for Mohs was explained to the patient and consent was obtained. The risks, benefits and alternatives to therapy were discussed in detail. Specifically, the risks of changes in hair growth pattern secondary to repair, infection, scarring, bleeding, prolonged wound healing, incomplete removal, allergy to anesthesia, nerve injury and recurrence were addressed. Prior to the procedure, the treatment site was clearly identified and confirmed by the patient. All components of Universal Protocol/PAUSE Rule completed.

## 2022-06-14 ENCOUNTER — TELEPHONE (OUTPATIENT)
Dept: FAMILY MEDICINE CLINIC | Age: 77
End: 2022-06-14

## 2022-06-14 DIAGNOSIS — Z00.00 ROUTINE MEDICAL EXAM: ICD-10-CM

## 2022-06-14 DIAGNOSIS — E55.9 VITAMIN D DEFICIENCY: ICD-10-CM

## 2022-06-14 DIAGNOSIS — Z00.00 MEDICARE ANNUAL WELLNESS VISIT, SUBSEQUENT: Primary | ICD-10-CM

## 2022-06-14 NOTE — TELEPHONE ENCOUNTER
PT has an appt on 6/8/2022 and pt is over at [facility] to get labs taken can we please get labs in.

## 2022-06-15 LAB
25(OH)D3+25(OH)D2 SERPL-MCNC: 70.7 NG/ML (ref 30–100)
ALBUMIN SERPL-MCNC: 4.7 G/DL (ref 3.7–4.7)
ALBUMIN/GLOB SERPL: 1.5 {RATIO} (ref 1.2–2.2)
ALP SERPL-CCNC: 105 IU/L (ref 44–121)
ALT SERPL-CCNC: 13 IU/L (ref 0–32)
AST SERPL-CCNC: 21 IU/L (ref 0–40)
BASOPHILS # BLD AUTO: 0 X10E3/UL (ref 0–0.2)
BASOPHILS NFR BLD AUTO: 1 %
BILIRUB SERPL-MCNC: 0.7 MG/DL (ref 0–1.2)
BUN SERPL-MCNC: 15 MG/DL (ref 8–27)
BUN/CREAT SERPL: 28 (ref 12–28)
CALCIUM SERPL-MCNC: 9.7 MG/DL (ref 8.7–10.3)
CHLORIDE SERPL-SCNC: 99 MMOL/L (ref 96–106)
CHOLEST SERPL-MCNC: 150 MG/DL (ref 100–199)
CO2 SERPL-SCNC: 27 MMOL/L (ref 20–29)
CREAT SERPL-MCNC: 0.53 MG/DL (ref 0.57–1)
EGFR: 95 ML/MIN/1.73
EOSINOPHIL # BLD AUTO: 0.2 X10E3/UL (ref 0–0.4)
EOSINOPHIL NFR BLD AUTO: 3 %
ERYTHROCYTE [DISTWIDTH] IN BLOOD BY AUTOMATED COUNT: 13 % (ref 11.7–15.4)
EST. AVERAGE GLUCOSE BLD GHB EST-MCNC: 108 MG/DL
GLOBULIN SER CALC-MCNC: 3.1 G/DL (ref 1.5–4.5)
GLUCOSE SERPL-MCNC: 78 MG/DL (ref 65–99)
HBA1C MFR BLD: 5.4 % (ref 4.8–5.6)
HCT VFR BLD AUTO: 33.8 % (ref 34–46.6)
HDLC SERPL-MCNC: 92 MG/DL
HGB BLD-MCNC: 11.2 G/DL (ref 11.1–15.9)
IMM GRANULOCYTES # BLD AUTO: 0 X10E3/UL (ref 0–0.1)
IMM GRANULOCYTES NFR BLD AUTO: 0 %
LDLC SERPL CALC-MCNC: 48 MG/DL (ref 0–99)
LYMPHOCYTES # BLD AUTO: 1.6 X10E3/UL (ref 0.7–3.1)
LYMPHOCYTES NFR BLD AUTO: 29 %
MCH RBC QN AUTO: 28.4 PG (ref 26.6–33)
MCHC RBC AUTO-ENTMCNC: 33.1 G/DL (ref 31.5–35.7)
MCV RBC AUTO: 86 FL (ref 79–97)
MONOCYTES # BLD AUTO: 0.5 X10E3/UL (ref 0.1–0.9)
MONOCYTES NFR BLD AUTO: 9 %
NEUTROPHILS # BLD AUTO: 3.3 X10E3/UL (ref 1.4–7)
NEUTROPHILS NFR BLD AUTO: 58 %
PLATELET # BLD AUTO: 245 X10E3/UL (ref 150–450)
POTASSIUM SERPL-SCNC: 4 MMOL/L (ref 3.5–5.2)
PROT SERPL-MCNC: 7.8 G/DL (ref 6–8.5)
RBC # BLD AUTO: 3.95 X10E6/UL (ref 3.77–5.28)
RETICS/RBC NFR AUTO: 1.9 % (ref 0.6–2.6)
SODIUM SERPL-SCNC: 141 MMOL/L (ref 134–144)
TRIGL SERPL-MCNC: 44 MG/DL (ref 0–149)
VLDLC SERPL CALC-MCNC: 10 MG/DL (ref 5–40)
WBC # BLD AUTO: 5.7 X10E3/UL (ref 3.4–10.8)

## 2022-06-17 ENCOUNTER — PATIENT MESSAGE (OUTPATIENT)
Dept: FAMILY MEDICINE CLINIC | Age: 77
End: 2022-06-17

## 2022-06-18 DIAGNOSIS — F51.01 PRIMARY INSOMNIA: ICD-10-CM

## 2022-06-20 RX ORDER — ZOLPIDEM TARTRATE 10 MG/1
20 TABLET ORAL
Qty: 60 TABLET | Refills: 3 | Status: SHIPPED | OUTPATIENT
Start: 2022-06-20 | End: 2022-08-29 | Stop reason: SDUPTHER

## 2022-07-20 ENCOUNTER — OFFICE VISIT (OUTPATIENT)
Dept: FAMILY MEDICINE CLINIC | Age: 77
End: 2022-07-20
Payer: MEDICARE

## 2022-07-20 VITALS
TEMPERATURE: 98.6 F | HEART RATE: 70 BPM | OXYGEN SATURATION: 97 % | SYSTOLIC BLOOD PRESSURE: 152 MMHG | DIASTOLIC BLOOD PRESSURE: 75 MMHG | RESPIRATION RATE: 20 BRPM

## 2022-07-20 DIAGNOSIS — K59.03 DRUG-INDUCED CONSTIPATION: ICD-10-CM

## 2022-07-20 DIAGNOSIS — G89.4 CHRONIC PAIN DISORDER: ICD-10-CM

## 2022-07-20 DIAGNOSIS — S81.809A OPEN WOUNDS INVOLVING MULTIPLE REGIONS OF LOWER EXTREMITY: Primary | ICD-10-CM

## 2022-07-20 DIAGNOSIS — G60.8: ICD-10-CM

## 2022-07-20 PROCEDURE — G8427 DOCREV CUR MEDS BY ELIG CLIN: HCPCS | Performed by: NURSE PRACTITIONER

## 2022-07-20 PROCEDURE — G8754 DIAS BP LESS 90: HCPCS | Performed by: NURSE PRACTITIONER

## 2022-07-20 PROCEDURE — 99215 OFFICE O/P EST HI 40 MIN: CPT | Performed by: NURSE PRACTITIONER

## 2022-07-20 PROCEDURE — G8753 SYS BP > OR = 140: HCPCS | Performed by: NURSE PRACTITIONER

## 2022-07-20 PROCEDURE — 1123F ACP DISCUSS/DSCN MKR DOCD: CPT | Performed by: NURSE PRACTITIONER

## 2022-07-20 PROCEDURE — 1090F PRES/ABSN URINE INCON ASSESS: CPT | Performed by: NURSE PRACTITIONER

## 2022-07-20 PROCEDURE — G8536 NO DOC ELDER MAL SCRN: HCPCS | Performed by: NURSE PRACTITIONER

## 2022-07-20 PROCEDURE — G8417 CALC BMI ABV UP PARAM F/U: HCPCS | Performed by: NURSE PRACTITIONER

## 2022-07-20 PROCEDURE — G8432 DEP SCR NOT DOC, RNG: HCPCS | Performed by: NURSE PRACTITIONER

## 2022-07-20 PROCEDURE — 1101F PT FALLS ASSESS-DOCD LE1/YR: CPT | Performed by: NURSE PRACTITIONER

## 2022-07-20 PROCEDURE — G8400 PT W/DXA NO RESULTS DOC: HCPCS | Performed by: NURSE PRACTITIONER

## 2022-07-20 RX ORDER — DOCUSATE SODIUM 100 MG/1
CAPSULE, LIQUID FILLED ORAL
Qty: 180 CAPSULE | Refills: 3 | Status: SHIPPED | OUTPATIENT
Start: 2022-07-20

## 2022-07-20 RX ORDER — DULOXETIN HYDROCHLORIDE 60 MG/1
60 CAPSULE, DELAYED RELEASE ORAL DAILY
Qty: 90 CAPSULE | Refills: 1 | Status: SHIPPED | OUTPATIENT
Start: 2022-07-20

## 2022-07-20 NOTE — PROGRESS NOTES
Chief Complaint   Patient presents with    Follow-up    Hypertension     Visit Vitals  BP (!) 152/75 (BP 1 Location: Left upper arm)   Pulse 70   Temp 98.6 °F (37 °C) (Temporal)   Resp 20   SpO2 97%     Subjective  Chadd Escobar is a 68 y.o. female. HPI:   Pt presented for followup. She was accompanied by her daughter. Pt stated that she was feeling \"all worn out. \"   Patient has complex medical history with many comorbidities to include chronic pain disorder, peripheral vascular disease, hypertension, osteoarthritis, insomnia, asthma, peripheral edema, hereditary autonomic neuropathy. Patient has been under the care of [address] [place] [address] wound center. A particular concern today is the daughter and patient both feel that her legs are looking worse. Part of her wound care is to wrap both of her legs and Ace wraps and then apply compression stockings. Now patient has developed open areas and hard raised lesions on both legs and the edema is no better even with the wraps. Patient cannot get the compression stockings on by herself and daughter cannot do it either. Patient and daughter were advised to call wound care center ASAP to discuss current situation and see if she can get an appointment for reevaluation. Patient presented in her scooter as she is only able to ambulate a few steps and is a fall risk. Her daughter is her caretaker I cannot be with patient all the time. She has c/o that she is urinating large amounts of urine and having incontinence. She is under the care of a chronic pain specialist for generalized pain due to osteoarthritis and she has hereditary autonomic neuropathy. Patient is upset because the pain specialist is decreasing her opioids and so she does not feel that her pain is well controlled.   She has been going to water therapy for years at [address] [place] [address] but now there is a limit on how many people can be in the pool and the amount of time they can spend which is not what patient is used to. She is not getting as much exercise as she used to. She has chronic constipation from the opioid use. All her recent lab results were discussed w/ pt today. 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    amLODIPine (NORVASC) 10 mg, Oral, DAILY    bisacodyL (DULCOLAX) 10 mg, Oral, DAILY    docusate sodium (COLACE) 100 mg capsule TAKE 1 CAPSULE BY MOUTH TWICE A DAY FOR 90 DAYS    DULoxetine (CYMBALTA) 60 mg, Oral, DAILY    fluticasone propion-salmeteroL (ADVAIR/WIXELA) 250-50 mcg/dose diskus inhaler Wixela Inhub 250 mcg-50 mcg/dose powder for inhalation    folic acid (FOLVITE) 1 mg tablet TAKE 1 TABLET BY MOUTH EVERY DAY    gabapentin (NEURONTIN) 800 mg tablet TAKE 1 TABLET BY MOUTH THREE (3) TIMES DAILY. MAX DAILY AMOUNT 2,400 MG.     HYDROcodone-acetaminophen (NORCO)  mg tablet hydrocodone 10 mg-acetaminophen 325 mg tablet    ibuprofen (MOTRIN) 200 mg, Oral, EVERY 6 HOURS AS NEEDED    lidocaine 5 % topical cream Use a thin layer to affected areas 2 x day.    lisinopriL (PRINIVIL, ZESTRIL) 20 mg tablet TAKE 1 TABLET BY MOUTH EVERY DAY    naloxone (NARCAN) 4 mg/actuation nasal spray Use 1 spray intranasally, then discard. Repeat with new spray every 2 min as needed for opioid overdose symptoms, alternating nostrils. pyridoxine, vitamin B6, (VITAMIN B-6) 50 mg tablet TAKE 1 TABLET BY MOUTH EVERY DAY    Wixela Inhub 250-50 mcg/dose diskus inhaler TAKE 1 PUFF BY MOUTH TWICE A DAY    zolpidem (AMBIEN) 20 mg, Oral, EVERY BEDTIME     Allergies   Allergen Reactions    Morphine Other (comments)     hallucinations    Morphine Anxiety     Social History     Tobacco Use    Smoking status: Never    Smokeless tobacco: Never   Vaping Use    Vaping Use: Never used   Substance Use Topics    Alcohol use: No    Drug use: No     Family History   Problem Relation Age of Onset    Arthritis Mother     Heart defect Father     Diabetes Sister      Review of Systems   Constitutional:  Negative for chills, fever and malaise/fatigue. HENT:  Negative for congestion, ear pain and sore throat. Respiratory:  Negative for cough, shortness of breath and wheezing. Cardiovascular:  Positive for leg swelling. Negative for chest pain and palpitations. Gastrointestinal: Negative. Negative for abdominal pain, constipation, diarrhea, heartburn, nausea and vomiting. Genitourinary:         Incontinence     Musculoskeletal:  Positive for back pain, joint pain and myalgias. Negative for falls and neck pain. Skin:         Open areas areas and hard lesions on both of her legs. Neurological:  Positive for sensory change. Negative for dizziness, tingling, weakness and headaches. Psychiatric/Behavioral:  Negative for depression. The patient has insomnia. The patient is not nervous/anxious. Objective  Physical Exam  Vitals reviewed. Constitutional:       Appearance: Normal appearance. She is obese. She is ill-appearing. HENT:      Head: Normocephalic.       Right Ear: External ear normal.      Left Ear: External ear normal.      Nose: Nose normal.   Eyes:      Conjunctiva/sclera: Conjunctivae normal.   Neck:      Vascular: No carotid bruit. Cardiovascular:      Rate and Rhythm: Normal rate and regular rhythm. Heart sounds: Normal heart sounds. No murmur heard. Pulmonary:      Effort: Pulmonary effort is normal. No respiratory distress. Breath sounds: Normal breath sounds. Musculoskeletal:         General: No swelling or tenderness. Cervical back: Neck supple. Right lower leg: Edema present. Left lower leg: Edema present. Skin:     General: Skin is warm and dry. Coloration: Skin is not jaundiced. Findings: Lesion present. No rash. Comments: Multiple hard bumpy lesions on lower extremities in the foot and ankle area and on the lower aspect of the shins bilaterally. There are number of open lesions on both legs in the ankle area. Her legs are covered with Vaseline so it is difficult to tell if there is drainage. No odor, no erythema noted, no warmth. Neurological:      Mental Status: She is alert and oriented to person, place, and time. Motor: No weakness. Gait: Gait normal.   Psychiatric:         Behavior: Behavior normal.         Thought Content: Thought content normal.      Comments: Irritable and somewhat depressed. Assessment & Plan      ICD-10-CM ICD-9-CM    1. Open wounds involving multiple regions of lower extremity  S81.809A 894.0       2. Hereditary sensory and autonomic neuropathy  G60.8 356.2 DULoxetine (CYMBALTA) 60 mg capsule     356.0       3. Chronic pain disorder  G89.4 338.4 DULoxetine (CYMBALTA) 60 mg capsule      4. Drug-induced constipation  K59.03 564.09 docusate sodium (COLACE) 100 mg capsule     E980.5           1. Open wounds involving multiple regions of lower extremity  Does not appear to have an infection at this point but her legs do appear worse than the last time I saw her in the office.   Strongly encouraged daughter to contact the wound care center for status update and to see if patient can get in right away for an appointment for reassessment. 2. Hereditary sensory and autonomic neuropathy  Patient has stated in the past that gabapentin and Lyrica were not helpful at all for her symptoms. Discussed trying duloxetine for her neuropathic pain. Patient was wary because she is insistent that she does not have depression and does not want to be on depression medication. I assured her that one of the uses of duloxetine is for neuropathic pain and also for general pain. She was willing to give it a try. Advised her that we can go up to a higher dose if needed and do not expect total relief but hoping to ease up her symptoms. She stated understanding.    - DULoxetine (CYMBALTA) 60 mg capsule; Take 1 Capsule by mouth in the morning. Indications: neuropathic pain  Dispense: 90 Capsule; Refill: 1    3. Chronic pain disorder  As above. - DULoxetine (CYMBALTA) 60 mg capsule; Take 1 Capsule by mouth in the morning. Indications: neuropathic pain  Dispense: 90 Capsule; Refill: 1    4. Drug-induced constipation    - docusate sodium (COLACE) 100 mg capsule; TAKE 1 CAPSULE BY MOUTH TWICE A DAY FOR 90 DAYS  Dispense: 180 Capsule; Refill: 3     On this date 7/20/2022 I have spent 60 minutes reviewing previous notes, test results and face to face (virtual) with the patient discussing the diagnosis and importance of compliance with the treatment plan as well as documenting on the day of the visit. I have discussed the diagnosis with the patient and the intended plan as seen in the above orders. Pt/caretaker has expressed understanding. Questions were answered concerning future plans. I have discussed medication side effects and warnings as indicated with the patient as well.     Steffany Muñiz NP

## 2022-07-20 NOTE — PROGRESS NOTES
Chief Complaint   Patient presents with    Follow-up    Hypertension     Visit Vitals  BP (!) 152/75 (BP 1 Location: Left upper arm)   Pulse 70   Temp 98.6 °F (37 °C) (Temporal)   Resp 20   SpO2 97%       1. \"Have you been to the ER, urgent care clinic since your last visit? Hospitalized since your last visit? \" No    2. \"Have you seen or consulted any other health care providers outside of the [address] [place] [address] since your last visit? \" No     3. For patients aged 39-70: Has the patient had a colonoscopy / FIT/ Cologuard? NA - based on age      If the patient is female:    4. For patients aged 41-77: Has the patient had a mammogram within the past 2 years? NA - based on age or sex      11. For patients aged 21-65: Has the patient had a pap smear?  NA - based on age or sex

## 2022-08-04 PROBLEM — G62.9 PERIPHERAL NEUROPATHY: Status: ACTIVE | Noted: 2022-08-04

## 2022-08-04 PROBLEM — I83.029 VENOUS STASIS ULCERS OF BOTH LOWER EXTREMITIES (HCC): Status: ACTIVE | Noted: 2022-08-04

## 2022-08-04 PROBLEM — M54.17 LUMBOSACRAL RADICULOPATHY: Status: ACTIVE | Noted: 2022-08-04

## 2022-08-04 PROBLEM — I83.019 VENOUS STASIS ULCERS OF BOTH LOWER EXTREMITIES (HCC): Status: ACTIVE | Noted: 2022-08-04

## 2022-08-04 PROBLEM — Z91.199 H/O NONCOMPLIANCE WITH MEDICAL TREATMENT, PRESENTING HAZARDS TO HEALTH: Status: ACTIVE | Noted: 2022-08-04

## 2022-08-04 PROBLEM — L97.919 VENOUS STASIS ULCERS OF BOTH LOWER EXTREMITIES (HCC): Status: ACTIVE | Noted: 2022-08-04

## 2022-08-04 PROBLEM — M15.9 OSTEOARTHRITIS OF MULTIPLE JOINTS: Status: ACTIVE | Noted: 2020-12-13

## 2022-08-04 PROBLEM — M54.12 CERVICAL RADICULOPATHY: Status: ACTIVE | Noted: 2022-08-04

## 2022-08-04 PROBLEM — L97.929 VENOUS STASIS ULCERS OF BOTH LOWER EXTREMITIES (HCC): Status: ACTIVE | Noted: 2022-08-04

## 2022-08-04 RX ORDER — HYDROCODONE BITARTRATE AND ACETAMINOPHEN 10; 325 MG/1; MG/1
5.5 TABLET ORAL DAILY
COMMUNITY

## 2022-08-05 RX ORDER — ZOLPIDEM TARTRATE 10 MG/1
1 TABLET ORAL
COMMUNITY

## 2022-08-25 ENCOUNTER — OFFICE VISIT (OUTPATIENT)
Dept: ENT CLINIC | Age: 77
End: 2022-08-25
Payer: MEDICARE

## 2022-08-25 ENCOUNTER — OFFICE VISIT (OUTPATIENT)
Dept: ENT CLINIC | Age: 77
End: 2022-08-25

## 2022-08-25 VITALS
RESPIRATION RATE: 17 BRPM | HEART RATE: 68 BPM | HEIGHT: 71 IN | DIASTOLIC BLOOD PRESSURE: 90 MMHG | OXYGEN SATURATION: 98 % | SYSTOLIC BLOOD PRESSURE: 140 MMHG | WEIGHT: 247 LBS | BODY MASS INDEX: 34.58 KG/M2

## 2022-08-25 DIAGNOSIS — R26.89 IMBALANCE: ICD-10-CM

## 2022-08-25 DIAGNOSIS — H90.3 ASYMMETRIC SNHL (SENSORINEURAL HEARING LOSS): ICD-10-CM

## 2022-08-25 DIAGNOSIS — H91.93 DECREASED HEARING OF BOTH EARS: Primary | ICD-10-CM

## 2022-08-25 DIAGNOSIS — H93.13 BILATERAL TINNITUS: ICD-10-CM

## 2022-08-25 DIAGNOSIS — H90.3 SENSORINEURAL HEARING LOSS (SNHL) OF BOTH EARS: Primary | ICD-10-CM

## 2022-08-25 PROCEDURE — 1101F PT FALLS ASSESS-DOCD LE1/YR: CPT | Performed by: OTOLARYNGOLOGY

## 2022-08-25 PROCEDURE — 99203 OFFICE O/P NEW LOW 30 MIN: CPT | Performed by: OTOLARYNGOLOGY

## 2022-08-25 PROCEDURE — 1123F ACP DISCUSS/DSCN MKR DOCD: CPT | Performed by: OTOLARYNGOLOGY

## 2022-08-25 PROCEDURE — 1090F PRES/ABSN URINE INCON ASSESS: CPT | Performed by: OTOLARYNGOLOGY

## 2022-08-25 PROCEDURE — G8417 CALC BMI ABV UP PARAM F/U: HCPCS | Performed by: OTOLARYNGOLOGY

## 2022-08-25 PROCEDURE — G8536 NO DOC ELDER MAL SCRN: HCPCS | Performed by: OTOLARYNGOLOGY

## 2022-08-25 PROCEDURE — G8427 DOCREV CUR MEDS BY ELIG CLIN: HCPCS | Performed by: OTOLARYNGOLOGY

## 2022-08-25 PROCEDURE — G8755 DIAS BP > OR = 90: HCPCS | Performed by: OTOLARYNGOLOGY

## 2022-08-25 PROCEDURE — G8753 SYS BP > OR = 140: HCPCS | Performed by: OTOLARYNGOLOGY

## 2022-08-25 PROCEDURE — G8510 SCR DEP NEG, NO PLAN REQD: HCPCS | Performed by: OTOLARYNGOLOGY

## 2022-08-25 PROCEDURE — 92557 COMPREHENSIVE HEARING TEST: CPT | Performed by: AUDIOLOGIST

## 2022-08-25 PROCEDURE — G8400 PT W/DXA NO RESULTS DOC: HCPCS | Performed by: OTOLARYNGOLOGY

## 2022-08-25 PROCEDURE — 92567 TYMPANOMETRY: CPT | Performed by: AUDIOLOGIST

## 2022-08-25 NOTE — PROGRESS NOTES
Nico Kaminski, a 68y.o. year old female, was seen in ENT clinic today for a hearing evaluation on referral from Dr. Frank Sheets. Patient complains of hearing loss (L>R) and tinnitus. She also reports recent imbalance (began approximately 1 week ago). Patient reports most recent audiogram is ?1 year (unable to recall specifically) and hearing loss was noted at that time. She reports a history of occupational noise exposure [employer]) with use of hearing protection once it was mandated. Otoscopy: normal external ear canals and visible tympanic membranes, bilaterally. Tympanometry: RE Type Ad, hypermobile  LE Type Ad, hypermobile    SRT: RE Speech Reception Threshold (SRT) was obtained at 45 dBHL LE Speech Reception Threshold (SRT) was obtained at 55 dBHL    WRS: RE Fair in quiet when words were presented at 70 dBHL. (25 dB SL per patient MCL)  WRS: LE Poor in quiet when words were presented at 80 dBHL. (25 dB SL per patient MCL). Word recognition score worsened when presentation level was increased to 90 dBHL. Pure tone audiometry:  RE: (Moderate at 250Hz) Moderate-severe rising to mild sensorineural hearing loss  LE: (Moderate at 250Hz) Moderate-severe rising to moderate sensorineural hearing loss    Sensorineural hearing loss, bilaterally. Slight asymmetry noted (L>R)    Impressions:  hearing loss requiring medical/otologic and audiologic follow-up  Discussed results of today's testing with patient. Explained relationship between hearing loss and tinnitus. Patient is a candidate for amplification. Discussed benefits of hearing aids for amplification as well as potential masking for tinnitus. Recommended hearing aid evaluation appointment to discuss options. Patient indicated understanding. Provided tinnitus and hearing aid evaluation handouts today. Plan:  Follow-up with ENT.   Repeat audiogram upon request.  Hearing aid evaluation upon request.    Ana Ramos   Doctor of Audiology

## 2022-08-25 NOTE — LETTER
8/26/2022    Patient: Casper Montoya   YOB: 1945   Date of Visit: 8/25/2022     Bhargav Avalos NP  [facility] [address] [address]  Via [address] [facility] [address]    Dear Bhargav Avalos NP,      Thank you for referring Ms. Juan Rushing to [facility] [address], THROAT AND ALLERGY CARE for evaluation. My notes for this consultation are attached. If you have questions, please do not hesitate to call me. I look forward to following your patient along with you.       Sincerely,    Amina Hardy MD

## 2022-08-26 ENCOUNTER — TELEPHONE (OUTPATIENT)
Dept: ENT CLINIC | Age: 77
End: 2022-08-26

## 2022-08-26 DIAGNOSIS — H90.3 ASYMMETRIC SNHL (SENSORINEURAL HEARING LOSS): Primary | ICD-10-CM

## 2022-08-26 DIAGNOSIS — R26.89 IMBALANCE: ICD-10-CM

## 2022-08-26 NOTE — TELEPHONE ENCOUNTER
Called and spoke w patient re: audiogram results    Discussed option of serial audio or MRI    Daughter favors MRI which is reasonable given asymmetry, asymmetric, worsening WRS as well as new imbalance    I will call with MRI results    They will arrange hearing aid MD Sharlene Kenney [facility]  [address] [place] [address]  Office Phone: [phone]

## 2022-08-26 NOTE — PROGRESS NOTES
Otolaryngology-Head and Neck Surgery  New Patient Visit     Patient: Gregary Klinefelter  YOB: 1945  MRN: [id number]  Date of Service: 8/25/2022    Chief Complaint:  Hearing loss     History of Present Illness: Gregary Klinefelter is a 68y.o. year old female who presents today with her daughter for discussion of hearing loss    Hearing loss is chronic and progressive, slowly    Both ears feel similar    Also has bilateral tinnitus - severe enough to the point that she thought she was hearing noises outside the home and calling police etc. Has been better lately     In the last week has developed sensation of imbalance - feels like falling forward  In a wheelchair at baseline     Past Medical History:  Past Medical History:   Diagnosis Date    Asthma        Past Surgical History:   Past Surgical History:   Procedure Laterality Date    HX ORTHOPAEDIC         Medications:   Current Outpatient Medications   Medication Instructions    albuterol (PROVENTIL HFA, VENTOLIN HFA, PROAIR HFA) 90 mcg/actuation inhaler INHALE 1 PUFF BY MOUTH EVERY 4-6 HOURS AS NEEDED.    amLODIPine (NORVASC) 10 mg, Oral, DAILY    bisacodyL (DULCOLAX) 10 mg, Oral, DAILY    docusate sodium (COLACE) 100 mg capsule TAKE 1 CAPSULE BY MOUTH TWICE A DAY FOR 90 DAYS    DULoxetine (CYMBALTA) 60 mg, Oral, DAILY    fluticasone propion-salmeteroL (ADVAIR/WIXELA) 250-50 mcg/dose diskus inhaler Wixela Inhub 250 mcg-50 mcg/dose powder for inhalation    folic acid (FOLVITE) 1 mg tablet TAKE 1 TABLET BY MOUTH EVERY DAY    gabapentin (NEURONTIN) 800 mg tablet TAKE 1 TABLET BY MOUTH THREE (3) TIMES DAILY. MAX DAILY AMOUNT 2,400 MG. HYDROcodone-acetaminophen (NORCO)  mg tablet 5.5 Tablets, DAILY, Take 2 tabs by mouth in am, 1.5 tabs in afternoon, and 2 tabs at bedtime. ibuprofen (MOTRIN) 200 mg, Oral, EVERY 6 HOURS AS NEEDED    lidocaine 5 % topical cream Use a thin layer to affected areas 2 x day.     lisinopriL (PRINIVIL, ZESTRIL) 20 mg tablet TAKE 1 TABLET BY MOUTH EVERY DAY    naloxone (NARCAN) 4 mg/actuation nasal spray Use 1 spray intranasally, then discard. Repeat with new spray every 2 min as needed for opioid overdose symptoms, alternating nostrils. pyridoxine, vitamin B6, (VITAMIN B-6) 50 mg tablet TAKE 1 TABLET BY MOUTH EVERY DAY    Wixela Inhub 250-50 mcg/dose diskus inhaler TAKE 1 PUFF BY MOUTH TWICE A DAY    zolpidem (AMBIEN) 10 mg tablet 1 Tablet, Oral, BEDTIME PRN    zolpidem (AMBIEN) 20 mg, Oral, EVERY BEDTIME       Allergies: Allergies   Allergen Reactions    Morphine Other (comments)     hallucinations    Morphine Anxiety       Social History:   Social History     Tobacco Use    Smoking status: Never    Smokeless tobacco: Never   Vaping Use    Vaping Use: Never used   Substance Use Topics    Alcohol use: No    Drug use: No        Family History:  Family History   Problem Relation Age of Onset    Arthritis Mother     Heart defect Father     Diabetes Sister        Review of Systems:    Consitutional: denies fever, excessive weight gain or loss. Eyes: denies diplopia, eye pain. Integumentary: denies new concerning skin lesions. Ears, Nose, Mouth, Throat: denies except as per HPI. Endocrine: denies hot or cold intolerance, increased thirst.  Respiratory: denies cough, hemoptysis, wheezing  Gastrointestinal: denies trouble swallowing, nausea, emesis, regurgitation  Musculoskeletal: denies muscle weakness or wasting  Cardiovascular: denies chest pain, shortness of breath  Neurologic: denies seizures, numbness or tingling, syncope  Hematologic: denies easy bleeding or bruising    Physical Examination:   Vitals:    08/25/22 1344   BP: (!) 140/90   BP 1 Location: Left upper arm   BP Patient Position: Sitting   BP Cuff Size: Adult   Pulse: 68   Resp: 17   Height: 5' 11\" (1.803 m)   Weight: 247 lb (112 kg)   SpO2: 98%        General: Comfortable, pleasant, appears stated age  Voice: Strong, speaking in full sentences, no stridor.  Hard of hearing  Face: No masses or lesions, facial strength symmetric   Ears: External ears unremarkable. Bilateral ear canal clear. Tympanic membrane clear and intact, with visible landmarks. Clear middle ear space  Nose: External nose unremarkable. Dorsum midline. Anterior rhinoscopy demonstrates no lesions. Septum midline. Turbinates without hypertrophy. Oral Cavity / Oropharynx: No trismus. Mucosa pink and moist. No lesions. Tongue is midline and mobile. Palate elevates symmetrically. Uvula midline. Tonsils unremarkable. Base of tongue soft. Floor of mouth soft. Neck: Supple. No adenopathy. Thyroid unremarkable. Palpable laryngeal landmarks. Full neck range of motion   Neurologic: Wheelchair    Audiogram today        Assessment and Plan:   Bilateral SNHL  Asymmetric SNHL  Tinnitus  Imbalance  - I was not able to review audiogram findings with patient and daughter in the office due to timing   - I will call daughter with patient permission to discuss results  - Asymmetric hearing loss, left ear reduced hearing and also reduced WRS  - She does have imbalance - but this is new in the last week  - Could consider MRI IAC to evaluate for retrocochlear pathology, whit if imbalance persists  - Would at minimum plan serial audiogram  - Consider hearing aids to aid with hearing and tinnitus   - She's had an old audiogram via [facility] 1+ year ago but daughter does not believe we have that for comparison        The patient was instructed to return to clinic if no improvement or progression of symptoms. Signs to watch out for reviewed.       MD [facility]  [address] [place] [address] 6  [facility]  Office Phone: [phone]

## 2022-08-29 ENCOUNTER — OFFICE VISIT (OUTPATIENT)
Dept: NEUROLOGY | Age: 77
End: 2022-08-29
Payer: MEDICARE

## 2022-08-29 VITALS
HEART RATE: 79 BPM | DIASTOLIC BLOOD PRESSURE: 77 MMHG | BODY MASS INDEX: 34.45 KG/M2 | TEMPERATURE: 97.8 F | OXYGEN SATURATION: 96 % | RESPIRATION RATE: 18 BRPM | SYSTOLIC BLOOD PRESSURE: 138 MMHG | HEIGHT: 71 IN

## 2022-08-29 DIAGNOSIS — G60.8 HEREDITARY SENSORY/AUTONOMIC NEUROPATHY (HSN/HSAN): ICD-10-CM

## 2022-08-29 DIAGNOSIS — M79.18 MYOFASCIAL MUSCLE PAIN: ICD-10-CM

## 2022-08-29 DIAGNOSIS — F51.01 PRIMARY INSOMNIA: ICD-10-CM

## 2022-08-29 DIAGNOSIS — G89.4 CHRONIC PAIN SYNDROME: ICD-10-CM

## 2022-08-29 DIAGNOSIS — G62.9 POLYNEUROPATHY: Primary | ICD-10-CM

## 2022-08-29 PROCEDURE — 1090F PRES/ABSN URINE INCON ASSESS: CPT | Performed by: PSYCHIATRY & NEUROLOGY

## 2022-08-29 PROCEDURE — G8754 DIAS BP LESS 90: HCPCS | Performed by: PSYCHIATRY & NEUROLOGY

## 2022-08-29 PROCEDURE — 1123F ACP DISCUSS/DSCN MKR DOCD: CPT | Performed by: PSYCHIATRY & NEUROLOGY

## 2022-08-29 PROCEDURE — G8427 DOCREV CUR MEDS BY ELIG CLIN: HCPCS | Performed by: PSYCHIATRY & NEUROLOGY

## 2022-08-29 PROCEDURE — 1101F PT FALLS ASSESS-DOCD LE1/YR: CPT | Performed by: PSYCHIATRY & NEUROLOGY

## 2022-08-29 PROCEDURE — G8536 NO DOC ELDER MAL SCRN: HCPCS | Performed by: PSYCHIATRY & NEUROLOGY

## 2022-08-29 PROCEDURE — G8752 SYS BP LESS 140: HCPCS | Performed by: PSYCHIATRY & NEUROLOGY

## 2022-08-29 PROCEDURE — G8510 SCR DEP NEG, NO PLAN REQD: HCPCS | Performed by: PSYCHIATRY & NEUROLOGY

## 2022-08-29 PROCEDURE — G8417 CALC BMI ABV UP PARAM F/U: HCPCS | Performed by: PSYCHIATRY & NEUROLOGY

## 2022-08-29 PROCEDURE — G8400 PT W/DXA NO RESULTS DOC: HCPCS | Performed by: PSYCHIATRY & NEUROLOGY

## 2022-08-29 PROCEDURE — 99214 OFFICE O/P EST MOD 30 MIN: CPT | Performed by: PSYCHIATRY & NEUROLOGY

## 2022-08-29 RX ORDER — ZOLPIDEM TARTRATE 10 MG/1
20 TABLET ORAL
Qty: 60 TABLET | Refills: 5 | Status: SHIPPED | OUTPATIENT
Start: 2022-08-29

## 2022-08-29 NOTE — PROGRESS NOTES
Chief Complaint   Patient presents with    Follow-up    Numbness    1. Have you been to the ER, urgent care clinic since your last visit?  no  Hospitalized since your last visit? no    2. Have you seen or consulted any other health care providers outside of the [address] [place] [address] since your last visit? [facility] vein center Include any pap smears or colon screening.     Two weeks ago had vein ablation

## 2022-08-29 NOTE — PROGRESS NOTES
Neurology Progress Note    NAME:  Isaak Vasquez   :   1945   MRN:   [id number]     Date/Time:  2022  Subjective:    Isaak Vasquez is a 68 y.o. female here today for follow-up for neuropathy, gait disorder, chronic pain, sleep disorder-primary insomnia  Patient was accompanied by her daughter to the visit  Patient continues to have a lot of pain mostly in the legs, also difficulty sleeping. Patient is currently following with another pain management specialist, who is managing her pain while I will manage her sleep medications for now. Once again I discussed with patient and told her that I could do the Ambien for now while she is looking for sleep medicine specialist.  She continues to participate in intermittent physical therapy with aqua therapy and she is enjoying it. She has not had any falls since last visit. Patient said her pain is diffused, unrelenting, aggravated by activity. Patient is dependent on motorized wheelchair to ambulate.   Patient says she is having difficulty going to sleep and staying asleep, because of that, her physician was giving her Ambien 20 mg nightly which I am continuing on today she goes to sleep medicine specialist.  Review of Systems - General ROS: positive for  - fatigue, night sweats, sleep disturbance and weight gain  Psychological ROS: positive for - anxiety and sleep disturbances  Ophthalmic ROS: positive for - blurry vision and decreased vision  ENT ROS: positive for - headaches, tinnitus and visual changes  Allergy and Immunology ROS: negative  Hematological and Lymphatic ROS: negative  Breast ROS: negative for breast lumps  Respiratory ROS: no cough, shortness of breath, or wheezing  Cardiovascular ROS: no chest pain or dyspnea on exertion  Gastrointestinal ROS: no abdominal pain, change in bowel habits, or black or bloody stools  Genito-Urinary ROS: no dysuria, trouble voiding, or hematuria  Musculoskeletal ROS: positive for - gait disturbance, joint pain, joint stiffness, joint swelling, muscle pain and muscular weakness  Neurological ROS: positive for - gait disturbance, headaches, impaired coordination/balance, numbness/tingling, visual changes and weakness  Dermatological ROS: negative      Medications reviewed:  Current Outpatient Medications   Medication Sig Dispense Refill    zolpidem (AMBIEN) 10 mg tablet Take 2 Tablets by mouth nightly. Max Daily Amount: 20 mg. 60 Tablet 5    HYDROcodone-acetaminophen (NORCO)  mg tablet Take 5.5 Tablets in the morning. Take 2 tabs by mouth in am, 1.5 tabs in afternoon, and 2 tabs at bedtime. albuterol (PROVENTIL HFA, VENTOLIN HFA, PROAIR HFA) 90 mcg/actuation inhaler INHALE 1 PUFF BY MOUTH EVERY 4-6 HOURS AS NEEDED. 18 g 3    gabapentin (NEURONTIN) 800 mg tablet TAKE 1 TABLET BY MOUTH THREE (3) TIMES DAILY. MAX DAILY AMOUNT 2,400 MG. Wixela Inhub 250-50 mcg/dose diskus inhaler TAKE 1 PUFF BY MOUTH TWICE A  Each 5    fluticasone propion-salmeteroL (ADVAIR/WIXELA) 250-50 mcg/dose diskus inhaler Wixela Inhub 250 mcg-50 mcg/dose powder for inhalation      naloxone (NARCAN) 4 mg/actuation nasal spray Use 1 spray intranasally, then discard. Repeat with new spray every 2 min as needed for opioid overdose symptoms, alternating nostrils. 1 Each 1    pyridoxine, vitamin B6, (VITAMIN B-6) 50 mg tablet TAKE 1 TABLET BY MOUTH EVERY DAY 90 Tab 2    ibuprofen (MOTRIN) 200 mg tablet Take 200 mg by mouth every six (6) hours as needed. bisacodyL (DULCOLAX) 5 mg EC tablet Take 2 Tabs by mouth daily. 90 Tab 3    amLODIPine (NORVASC) 10 mg tablet Take 10 mg by mouth daily. cilostazoL (PLETAL) 100 mg tablet TAKE 1 TABLET BY MOUTH TWICE A DAY FOR 30 DAYS      atorvastatin (LIPITOR) 20 mg tablet TAKE 1 TABLET BY MOUTH EVERY DAY FOR 30 DAYS      zolpidem (AMBIEN) 10 mg tablet Take 1 Tablet by mouth nightly as needed. DULoxetine (CYMBALTA) 60 mg capsule Take 1 Capsule by mouth in the morning.  Indications: neuropathic pain 90 Capsule 1    docusate sodium (COLACE) 100 mg capsule TAKE 1 CAPSULE BY MOUTH TWICE A DAY FOR 90 DAYS 180 Capsule 3    lidocaine 5 % topical cream Use a thin layer to affected areas 2 x day. 45 g 0    lisinopriL (PRINIVIL, ZESTRIL) 20 mg tablet TAKE 1 TABLET BY MOUTH EVERY DAY 90 Tablet 3    folic acid (FOLVITE) 1 mg tablet TAKE 1 TABLET BY MOUTH EVERY DAY 90 Tab 1        Objective:   Vitals:  Vitals:    08/29/22 1402   BP: 138/77   Pulse: 79   Resp: 18   Temp: 97.8 °F (36.6 °C)   TempSrc: Temporal   SpO2: 96%   Height: 5' 11\" (1.803 m)   PainSc:   6               Lab Data Reviewed:  Lab Results   Component Value Date/Time    WBC 5.7 06/14/2022 04:49 PM    HCT 33.8 (L) 06/14/2022 04:49 PM    HGB 11.2 06/14/2022 04:49 PM    PLATELET 146 70/67/6935 04:49 PM       Lab Results   Component Value Date/Time    Sodium 141 06/14/2022 04:49 PM    Potassium 4.0 06/14/2022 04:49 PM    Chloride 99 06/14/2022 04:49 PM    CO2 27 06/14/2022 04:49 PM    Glucose 78 06/14/2022 04:49 PM    BUN 15 06/14/2022 04:49 PM    Creatinine 0.53 (L) 06/14/2022 04:49 PM    Calcium 9.7 06/14/2022 04:49 PM       No components found for: TROPQUANT    No results found for: OWEN      Lab Results   Component Value Date/Time    Hemoglobin A1c 5.4 06/14/2022 04:49 PM        Lab Results   Component Value Date/Time    Vitamin B12 >2000 (H) 04/08/2021 01:21 PM       No results found for: Anali WEAVER XBANA    Lab Results   Component Value Date/Time    Cholesterol, total 150 06/14/2022 04:49 PM    HDL Cholesterol 92 06/14/2022 04:49 PM    LDL, calculated 48 06/14/2022 04:49 PM    VLDL, calculated 10 06/14/2022 04:49 PM    Triglyceride 44 06/14/2022 04:49 PM         CT Results (recent):  Results from [place] encounter on 01/23/19    CT LOW EXT LT WO CONT    Narrative  EXAM: CT LOW EXT LT WO CONT    INDICATION: Severe left knee pain. Left knee replacement on unspecified date.     COMPARISON: [The views on 12/27/2016. TECHNIQUE: Helical CT of the left knee with three-dimensional, coronal and  sagittal reformats. Images reviewed in soft tissue and bone windows. CT dose  reduction was achieved through the use of a standardized protocol tailored for  this examination and automatic exposure control for dose modulation. CONTRAST: None. FINDINGS: Hardware: Metal artifact arises from the femoral and tibial components  of the knee prosthesis. Patellar component is radiolucent and difficult to  evaluate. No lucency around the components. Tibial component is partially  secured with cement. Bones: Mild osteopenia. No acute fracture. No suspicious bone lesion. Joint fluid: Small suprapatellar joint effusion. No evidence of radiolucent  loose body. Articulations: Tricompartment prosthesis. Tendons: Extensor mechanism is intact within the limitation of metal artifact. Muscles: Mild atrophy. Soft tissue mass: None. Stranding in the subcutaneous fat lateral to the distal  femur may represent soft tissue contusion. Impression  IMPRESSION:  1. Left knee prosthesis is in good position without evidence of fracture or  complication. 2. Nonspecific joint effusion. 3. Fat stranding lateral to the distal femur may represent soft tissue contusion  in the proper clinical setting. No drainable fluid collection. MRI Results (recent):  Results from [place] encounter on 09/22/22    MRI IAC WO CONT    Narrative  Clinical indication: Asymmetric sensorineural hearing loss, loss of balance. Technical factors: Imaging, sagittal T1-weighted, axial T1-weighted T2-weighted  FLAIR gradient echo axial gradient echo temporal bone axial and coronal  T1-weighted temporal bone. Diffusion imaging does not show acute ischemic changes. There is no extra-axial fluid collection hemorrhage or shift. Flow voids in major vessels at the base of the brain are present.   There is mild atrophy and nonspecific white matter changes. No definite intracranial mass. Special attention to the temporal bones without intravenous contrast does not  show any definite abnormalities. Impression  1. Limited by lack of intravenous contrast.  2. No acute findings no mass. 3. Mild atrophy and nonspecific white matter changes. IR Results (recent):  No results found for this or any previous visit. VAS/US Results (recent):  No results found for this or any previous visit. PHYSICAL EXAM:  General:    Alert, cooperative, no distress, appears stated age. Head:   Normocephalic, without obvious abnormality, atraumatic. Eyes:   Conjunctivae/corneas clear. PERRLA  Nose:  Nares normal. No drainage or sinus tenderness. Throat:    Lips, mucosa, and tongue normal.  No Thrush  Neck:  Supple, symmetrical,  no adenopathy, thyroid: non tender    no carotid bruit and no JVD. Paraspinal tenderness  Back:    Symmetric, diffuse tenderness. Lungs:   Clear to auscultation bilaterally. No Wheezing or Rhonchi. No rales. Chest wall:  No tenderness or deformity. No Accessory muscle use. Heart:   Regular rate and rhythm,  no murmur, rub or gallop. Abdomen:   Soft, non-tender. Not distended. Bowel sounds normal. No masses  Extremities: Extremities normal, atraumatic, No cyanosis. No edema. No clubbing. Decreased range of motion shoulder joint  Skin:     Texture, turgor normal. No rashes or lesions. Not Jaundiced  Lymph nodes: Cervical, supraclavicular normal.  Psych:  Good insight. Depressed. Anxious. NEUROLOGICAL EXAM:  Appearance: The patient is well developed, well nourished, provides a coherent history and is in no acute distress. Mental Status: Oriented to time, place and person. Mood and affect appropriate. Cranial Nerves:   Intact visual fields. Fundi are benign. LIZ, EOM's full, no nystagmus, no ptosis. Facial sensation is normal. Corneal reflexes are intact. Facial movement is symmetric.  Hearing is normal bilaterally. Palate is midline with normal sternocleidomastoid and trapezius muscles are normal. Tongue is midline. Motor:  4+/5 strength in upper extremity and 3+/5 lower extremity proximal and distal muscles. Normal bulk and tone. No fasciculations. Reflexes:   Deep tendon reflexes 2+/4 and symmetrical.   Sensory:    Decreased sensation to touch, pinprick and vibration. Gait:   Unsteady gait. Ambulates with motorized wheelchair   Tremor:   No tremor noted. Cerebellar:  No cerebellar signs present. Neurovascular:  Normal heart sounds and regular rhythm, peripheral pulses intact, and no carotid bruits. Assesment  1. Polyneuropathy  Continue management with gabapentin    2. Primary insomnia    - zolpidem (AMBIEN) 10 mg tablet; Take 2 Tablets by mouth nightly. Max Daily Amount: 20 mg. Dispense: 60 Tablet; Refill: 5    3. Chronic pain syndrome  Following pain management    4. Hereditary sensory/autonomic neuropathy (HSN/HSAN)  Gabapentin    5. Myofascial muscle pain  Continue management    ___________________________________________________  PLAN: Medication and plan discussed with patient and daughter      ICD-10-CM ICD-9-CM    1. Polyneuropathy  G62.9 356.9       2. Primary insomnia  F51.01 307.42 zolpidem (AMBIEN) 10 mg tablet      3. Chronic pain syndrome  G89.4 338.4       4. Hereditary sensory/autonomic neuropathy (HSN/HSAN)  G60.8 356.2      356.0       5. Myofascial muscle pain  M79.18 729.1         Follow-up and Dispositions    Return in about 6 months (around 2/28/2023).                ___________________________________________________    Attending Physician: Vivian Fatima MD

## 2022-09-02 ENCOUNTER — OFFICE VISIT (OUTPATIENT)
Dept: FAMILY MEDICINE CLINIC | Age: 77
End: 2022-09-02
Payer: MEDICARE

## 2022-09-02 VITALS
TEMPERATURE: 98.2 F | HEIGHT: 71 IN | OXYGEN SATURATION: 96 % | SYSTOLIC BLOOD PRESSURE: 146 MMHG | DIASTOLIC BLOOD PRESSURE: 76 MMHG | WEIGHT: 245 LBS | BODY MASS INDEX: 34.3 KG/M2 | RESPIRATION RATE: 18 BRPM | HEART RATE: 70 BPM

## 2022-09-02 DIAGNOSIS — I83.019 VENOUS STASIS ULCERS OF BOTH LOWER EXTREMITIES (HCC): ICD-10-CM

## 2022-09-02 DIAGNOSIS — M15.9 OSTEOARTHRITIS OF MULTIPLE JOINTS, UNSPECIFIED OSTEOARTHRITIS TYPE: Primary | ICD-10-CM

## 2022-09-02 DIAGNOSIS — I83.029 VENOUS STASIS ULCERS OF BOTH LOWER EXTREMITIES (HCC): ICD-10-CM

## 2022-09-02 DIAGNOSIS — L97.929 VENOUS STASIS ULCERS OF BOTH LOWER EXTREMITIES (HCC): ICD-10-CM

## 2022-09-02 DIAGNOSIS — L97.919 VENOUS STASIS ULCERS OF BOTH LOWER EXTREMITIES (HCC): ICD-10-CM

## 2022-09-02 PROCEDURE — G8753 SYS BP > OR = 140: HCPCS | Performed by: NURSE PRACTITIONER

## 2022-09-02 PROCEDURE — G8754 DIAS BP LESS 90: HCPCS | Performed by: NURSE PRACTITIONER

## 2022-09-02 PROCEDURE — G8536 NO DOC ELDER MAL SCRN: HCPCS | Performed by: NURSE PRACTITIONER

## 2022-09-02 PROCEDURE — 99214 OFFICE O/P EST MOD 30 MIN: CPT | Performed by: NURSE PRACTITIONER

## 2022-09-02 PROCEDURE — 1101F PT FALLS ASSESS-DOCD LE1/YR: CPT | Performed by: NURSE PRACTITIONER

## 2022-09-02 PROCEDURE — G8432 DEP SCR NOT DOC, RNG: HCPCS | Performed by: NURSE PRACTITIONER

## 2022-09-02 PROCEDURE — 1123F ACP DISCUSS/DSCN MKR DOCD: CPT | Performed by: NURSE PRACTITIONER

## 2022-09-02 PROCEDURE — G8427 DOCREV CUR MEDS BY ELIG CLIN: HCPCS | Performed by: NURSE PRACTITIONER

## 2022-09-02 PROCEDURE — G8417 CALC BMI ABV UP PARAM F/U: HCPCS | Performed by: NURSE PRACTITIONER

## 2022-09-02 PROCEDURE — G8400 PT W/DXA NO RESULTS DOC: HCPCS | Performed by: NURSE PRACTITIONER

## 2022-09-02 PROCEDURE — 1090F PRES/ABSN URINE INCON ASSESS: CPT | Performed by: NURSE PRACTITIONER

## 2022-09-02 RX ORDER — ATORVASTATIN CALCIUM 20 MG/1
TABLET, FILM COATED ORAL
COMMUNITY
Start: 2022-08-17

## 2022-09-02 NOTE — PROGRESS NOTES
1. \"Have you been to the ER, urgent care clinic since your last visit? Hospitalized since your last visit? No     2. \"Have you seen or consulted any other health care providers outside of the [address] [place] [address] since your last visit? \" No     3. For patients aged 39-70: Has the patient had a colonoscopy / FIT/ Cologuard? Na     If the patient is female:    4. For patients aged 41-77: Has the patient had a mammogram within the past 2 years? Na       5. For patients aged 21-65: Has the patient had a pap smear? na        Fabiana Johnson is a 68 y.o. female is coming in for with the following:     Chief Complaint   Patient presents with    Hip Pain     Left pain           With the following vitals: There were no vitals taken for this visit.

## 2022-09-02 NOTE — PROGRESS NOTES
Chief Complaint   Patient presents with    Hip Pain     Left pain      Visit Vitals  BP (!) 146/76 (BP 1 Location: Right upper arm, BP Patient Position: Sitting, BP Cuff Size: Large adult)   Pulse 70   Temp 98.2 °F (36.8 °C) (Temporal)   Resp 18   Ht 5' 11\" (1.803 m)   Wt 245 lb (111.1 kg)   SpO2 96%   BMI 34.17 kg/m²     Subjective  Lashell Powell is a 68 y.o. female. HPI:  Pt presented for follow up. Accompanied by her daughter. Daughter provided 75% of info for HPI. Patient has complex medical history with many comorbidities to include chronic pain disorder, peripheral vascular disease, hypertension, osteoarthritis, insomnia, asthma, peripheral edema, hereditary autonomic neuropathy. At last appt on 7/20/2022, pt had open lesions on both lower extremities and edema of both lower extremities and was being seen at [facility]. Today the status of both her lower extremities is greatly improved. The open lesions have healed and the edema is much improved. Daughter stated that pt had unna boot applications to her legs which appeared to help greatly. Per daughter, wound care provider is happy also. She has chronic pain in multiple joints. She is under the care of new pain specialist who is trying to taper pt off her opioid pain medication. Pt is very unhappy w/ the changes. She was agreeable to starting duloxetine 60mg in July 2022 to help w/ pain control as long as it was not being used for depression /anxiety which pt denies she has. So far it is unclear of pain control benefit but if it lifts pt's mood it will be worth continuing. Has pain in both shoulders but R shoulder is giving her a lot of pain currently. She saw another ortho provider years ago who told her nothing could be done. She would be interested in another referral to ortho provider for second opinion. She was a pt for many years at [address] [place] [address] and saw her PMR provider for 20 yrs.  [facility] restructured and no longer have PMR providers in out pt setting. She had to establish w/ new provider and has been traumatic for her. She used to use the [facility] pool frequently which she enjoyed but now w/ Covid they have restricted how many persons can be in pool and how long pts can stay in pool. Pt has not been getting as much exercise as she is used to in past.   She has new dx of sensorineural hearing loss of both ears. She was seen by ENT Dr. Pito Valiente who referred her to audiology. Audiologist note indicated pt could benefit from hearing aides. Unclear if pt will pursue due to cost.   Pt was in office wheelchair today and did not bring her scooter w/ her today. She and daughter have been trying to get pt a new wheelchair or repair to current chair as pt is slipping out of the seat because the seat is slanted down. They are frustrated because cannot seem to get anywhere. Daughter stated that pt is supposed to start receiving PT/OT through [facility]. Per her record, confirmation fax was received to [facility] and scanned appropriately.  (Public [facility]eneship [facility]    Patient Active Problem List   Diagnosis Code    Severe obesity ([facility] E66.01    Bilateral leg pain M79.604, M79.605    Bilateral leg weakness R29.898    Chronic pain disorder G89.4    Essential hypertension I10    Drug-induced constipation K59.03    Insomnia G47.00    Asthma J45.909    Peripheral edema R60.9    Partial tear of left rotator cuff M75.112    Partial tear of right rotator cuff M75.111    Disuse muscle atrophy M62.50    Arthritis/arthropathy of multiple joints M12.9    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    PVD (peripheral vascular disease) ([facility]) I73.9    Myofascial pain M79.18    Venous stasis ulcers of both lower extremities ([facility] I83.019, I83.029, L97.919, L97.929    Peripheral neuropathy G62.9    H/O noncompliance with medical treatment, presenting hazards to health Z91.19    Cervical radiculopathy M54.12    Lumbosacral radiculopathy M54.17    Sensorineural hearing loss (SNHL) of both ears H90.3     Past Medical History:   Diagnosis Date    Arthritis     Asthma      Past Surgical History:   Procedure Laterality Date    HX ORTHOPAEDIC       Current Outpatient Medications   Medication Instructions    albuterol (PROVENTIL HFA, VENTOLIN HFA, PROAIR HFA) 90 mcg/actuation inhaler INHALE 1 PUFF BY MOUTH EVERY 4-6 HOURS AS NEEDED.    amLODIPine (NORVASC) 10 mg, Oral, DAILY    atorvastatin (LIPITOR) 20 mg tablet TAKE 1 TABLET BY MOUTH EVERY DAY FOR 30 DAYS    bisacodyL (DULCOLAX) 10 mg, Oral, DAILY    cilostazoL (PLETAL) 100 mg tablet TAKE 1 TABLET BY MOUTH TWICE A DAY FOR 30 DAYS    docusate sodium (COLACE) 100 mg capsule TAKE 1 CAPSULE BY MOUTH TWICE A DAY FOR 90 DAYS    DULoxetine (CYMBALTA) 60 mg, Oral, DAILY    fluticasone propion-salmeteroL (ADVAIR/WIXELA) 250-50 mcg/dose diskus inhaler Wixela Inhub 250 mcg-50 mcg/dose powder for inhalation    folic acid (FOLVITE) 1 mg tablet TAKE 1 TABLET BY MOUTH EVERY DAY    gabapentin (NEURONTIN) 800 mg tablet TAKE 1 TABLET BY MOUTH THREE (3) TIMES DAILY. MAX DAILY AMOUNT 2,400 MG. HYDROcodone-acetaminophen (NORCO)  mg tablet 5.5 Tablets, DAILY, Take 2 tabs by mouth in am, 1.5 tabs in afternoon, and 2 tabs at bedtime. ibuprofen (MOTRIN) 200 mg, Oral, EVERY 6 HOURS AS NEEDED    lidocaine 5 % topical cream Use a thin layer to affected areas 2 x day. lisinopriL (PRINIVIL, ZESTRIL) 20 mg tablet TAKE 1 TABLET BY MOUTH EVERY DAY    naloxone (NARCAN) 4 mg/actuation nasal spray Use 1 spray intranasally, then discard. Repeat with new spray every 2 min as needed for opioid overdose symptoms, alternating nostrils.     pyridoxine, vitamin B6, (VITAMIN B-6) 50 mg tablet TAKE 1 TABLET BY MOUTH EVERY DAY    Wixela Inhub 250-50 mcg/dose diskus inhaler TAKE 1 PUFF BY MOUTH TWICE A DAY    zolpidem (AMBIEN) 10 mg tablet 1 Tablet, Oral, BEDTIME PRN    zolpidem (AMBIEN) 20 mg, Oral, EVERY BEDTIME     Allergies   Allergen Reactions    Morphine Other (comments)     hallucinations    Morphine Anxiety     Social History     Tobacco Use    Smoking status: Never    Smokeless tobacco: Never   Vaping Use    Vaping Use: Never used   Substance Use Topics    Alcohol use: No    Drug use: No     Family History   Problem Relation Age of Onset    Arthritis Mother     Heart defect Father     Diabetes Sister      Review of Systems   Constitutional:  Negative for chills, fever and malaise/fatigue. HENT:  Negative for congestion, ear pain and sore throat. Respiratory:  Negative for cough, shortness of breath and wheezing. Cardiovascular:  Positive for chest pain. Negative for palpitations and leg swelling. Gastrointestinal:  Positive for constipation. Negative for abdominal pain, diarrhea, heartburn, nausea and vomiting. Genitourinary:  Negative for dysuria. Musculoskeletal:  Positive for joint pain. Negative for back pain, falls, myalgias and neck pain. Neurological:  Positive for headaches. Negative for dizziness, tingling, sensory change and weakness. Psychiatric/Behavioral:  Positive for depression. The patient is nervous/anxious. The patient does not have insomnia. Objective  Physical Exam  Vitals reviewed. Constitutional:       General: She is not in acute distress. Appearance: Normal appearance. She is obese. HENT:      Head: Normocephalic. Right Ear: External ear normal.      Left Ear: External ear normal.      Nose: Nose normal.   Eyes:      Conjunctiva/sclera: Conjunctivae normal.   Neck:      Vascular: No carotid bruit. Cardiovascular:      Rate and Rhythm: Normal rate and regular rhythm. Heart sounds: Normal heart sounds. No murmur heard. Pulmonary:      Effort: Pulmonary effort is normal. No respiratory distress. Breath sounds: Normal breath sounds.    Musculoskeletal:         General: No swelling or tenderness. Normal range of motion. Cervical back: Neck supple. Right lower leg: Edema present. Left lower leg: Edema present. Comments: Mild at ankles   Skin:     General: Skin is warm and dry. Coloration: Skin is not jaundiced. Findings: No lesion or rash. Neurological:      Mental Status: She is alert and oriented to person, place, and time. Motor: Weakness present. Gait: Gait normal.      Comments: Pt non ambulatory in room and office. Psychiatric:         Behavior: Behavior normal.         Thought Content: Thought content normal.         Judgment: Judgment normal.      Comments: Irritable at times       Assessment & Plan      ICD-10-CM ICD-9-CM    1. Osteoarthritis of multiple joints, unspecified osteoarthritis type  M15.9 715.89 REFERRAL TO ORTHOPEDIC SURGERY      2. Venous stasis ulcers of both lower extremities ([facility]  I83.019 459.81     I83.029 707.10     L97.919      L97.929            1. Osteoarthritis of multiple joints, unspecified osteoarthritis type  Worsening in R shoulder. Pt was agreeable to referral for second opinion to a new provider at [address] [place] [address]. Address and phone number of ortho office given to daughter and advised to call for an appt asap. - REFERRAL TO ORTHOPEDIC SURGERY    2. Venous stasis ulcers of both lower extremities (HCC)  Healed. Pt has resumed appts w/ vascular provider Dr. Anika Vera and may having more procedures done. I have discussed the diagnosis with the patient and the intended plan as seen in the above orders. Pt/caretaker has expressed understanding. Questions were answered concerning future plans. I have discussed medication side effects and warnings as indicated with the patient as well.     Juan Pablo Baptiste NP

## 2022-09-14 RX ORDER — CILOSTAZOL 100 MG/1
TABLET ORAL
COMMUNITY
Start: 2022-08-19

## 2022-09-15 PROBLEM — H90.3 SENSORINEURAL HEARING LOSS (SNHL) OF BOTH EARS: Status: ACTIVE | Noted: 2022-09-15

## 2022-09-16 DIAGNOSIS — M12.9 ARTHRITIS/ARTHROPATHY OF MULTIPLE JOINTS: Primary | ICD-10-CM

## 2022-09-16 NOTE — PROGRESS NOTES
I called [employer] the company that has supplied pt with her power chair that needs repair. They advised me to send an order for repair to the power chair which I did. I also sent pt's daughter who is her  a message that a rep from the company would be calling her to discuss repair and home visit.

## 2022-09-19 ENCOUNTER — TELEPHONE (OUTPATIENT)
Dept: ENT CLINIC | Age: 77
End: 2022-09-19

## 2022-09-19 NOTE — TELEPHONE ENCOUNTER
Per pt's insurance pt has benefits/discount through [employer] number: [phone]  Ref# [id number]

## 2022-09-22 ENCOUNTER — HOSPITAL ENCOUNTER (OUTPATIENT)
Dept: MRI IMAGING | Age: 77
Discharge: HOME | End: 2022-09-22
Attending: OTOLARYNGOLOGY
Payer: MEDICARE

## 2022-09-22 DIAGNOSIS — H90.3 ASYMMETRIC SNHL (SENSORINEURAL HEARING LOSS): ICD-10-CM

## 2022-09-22 DIAGNOSIS — R26.89 IMBALANCE: ICD-10-CM

## 2022-09-22 PROCEDURE — 70551 MRI BRAIN STEM W/O DYE: CPT

## 2022-09-29 ENCOUNTER — TELEPHONE (OUTPATIENT)
Dept: ENT CLINIC | Age: 77
End: 2022-09-29

## 2022-10-12 ENCOUNTER — TELEPHONE (OUTPATIENT)
Dept: NEUROLOGY | Age: 77
End: 2022-10-12

## 2022-10-13 ENCOUNTER — TELEPHONE (OUTPATIENT)
Dept: FAMILY MEDICINE CLINIC | Age: 77
End: 2022-10-13

## 2022-10-13 NOTE — TELEPHONE ENCOUNTER
Spoke to patient daughter and informed her of David Cuevas and she will call back in a couple of days to get numbers of other providers

## 2022-10-13 NOTE — TELEPHONE ENCOUNTER
Tried to call patient on both numbers in chart and both mailboxes were full so I could not leave a message